# Patient Record
Sex: FEMALE | Race: WHITE | NOT HISPANIC OR LATINO | Employment: FULL TIME | URBAN - METROPOLITAN AREA
[De-identification: names, ages, dates, MRNs, and addresses within clinical notes are randomized per-mention and may not be internally consistent; named-entity substitution may affect disease eponyms.]

---

## 2017-05-02 ENCOUNTER — ALLSCRIPTS OFFICE VISIT (OUTPATIENT)
Dept: OTHER | Facility: OTHER | Age: 47
End: 2017-05-02

## 2018-01-12 ENCOUNTER — APPOINTMENT (OUTPATIENT)
Dept: LAB | Facility: CLINIC | Age: 48
End: 2018-01-12
Payer: COMMERCIAL

## 2018-01-12 ENCOUNTER — TRANSCRIBE ORDERS (OUTPATIENT)
Dept: LAB | Facility: CLINIC | Age: 48
End: 2018-01-12

## 2018-01-12 DIAGNOSIS — M24.562 CONTRACTURE OF LEFT KNEE: ICD-10-CM

## 2018-01-12 DIAGNOSIS — M24.562 CONTRACTURE OF LEFT KNEE: Primary | ICD-10-CM

## 2018-01-12 LAB
APTT PPP: 30 SECONDS (ref 23–35)
INR PPP: 0.99 (ref 0.86–1.16)
PROTHROMBIN TIME: 13.1 SECONDS (ref 12.1–14.4)

## 2018-01-12 PROCEDURE — 36415 COLL VENOUS BLD VENIPUNCTURE: CPT

## 2018-01-12 PROCEDURE — 85610 PROTHROMBIN TIME: CPT

## 2018-01-12 PROCEDURE — 85730 THROMBOPLASTIN TIME PARTIAL: CPT

## 2018-01-13 VITALS
OXYGEN SATURATION: 98 % | HEART RATE: 92 BPM | HEIGHT: 63 IN | DIASTOLIC BLOOD PRESSURE: 68 MMHG | WEIGHT: 159.13 LBS | TEMPERATURE: 98.1 F | SYSTOLIC BLOOD PRESSURE: 120 MMHG | BODY MASS INDEX: 28.2 KG/M2

## 2018-01-15 ENCOUNTER — APPOINTMENT (OUTPATIENT)
Dept: LAB | Facility: CLINIC | Age: 48
End: 2018-01-15
Payer: COMMERCIAL

## 2018-01-15 LAB
ERYTHROCYTE [DISTWIDTH] IN BLOOD BY AUTOMATED COUNT: 13.1 % (ref 11.6–15.1)
HCT VFR BLD AUTO: 36.8 % (ref 34.8–46.1)
HGB BLD-MCNC: 12.2 G/DL (ref 11.5–15.4)
MCH RBC QN AUTO: 31 PG (ref 26.8–34.3)
MCHC RBC AUTO-ENTMCNC: 33.2 G/DL (ref 31.4–37.4)
MCV RBC AUTO: 93 FL (ref 82–98)
PLATELET # BLD AUTO: 318 THOUSANDS/UL (ref 149–390)
PMV BLD AUTO: 9.8 FL (ref 8.9–12.7)
RBC # BLD AUTO: 3.94 MILLION/UL (ref 3.81–5.12)
WBC # BLD AUTO: 7.46 THOUSAND/UL (ref 4.31–10.16)

## 2018-01-15 PROCEDURE — 85027 COMPLETE CBC AUTOMATED: CPT

## 2018-01-15 PROCEDURE — 36415 COLL VENOUS BLD VENIPUNCTURE: CPT

## 2018-01-22 ENCOUNTER — ALLSCRIPTS OFFICE VISIT (OUTPATIENT)
Dept: OTHER | Facility: OTHER | Age: 48
End: 2018-01-22

## 2018-01-24 NOTE — CONSULTS
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  Recent colonoscopy showed no evidence of recurrence  She denies any history fo CAD, CVD, CHF, Diabetes, or CKD  She denies any current fever or chills  She wants to start to be more physically active, but knee problems are preventing her from doing that  No previous problems with anesthesia  Pre-op labs reviewed with no significant findings  Review of Systems    Constitutional: No fever, no chills, feels well, no tiredness, no recent weight gain or weight loss  Eyes: No complaints of eye pain, no red eyes, no eyesight problems, no discharge, no dry eyes, no itching of eyes  ENT: no complaints of earache, no loss of hearing, no nose bleeds, no nasal discharge, no sore throat, no hoarseness  Cardiovascular: No complaints of slow heart rate, no fast heart rate, no chest pain, no palpitations, no leg claudication, no lower extremity edema  Respiratory: No complaints of shortness of breath, no wheezing, no cough, no SOB on exertion, no orthopnea, no PND  Gastrointestinal: No complaints of abdominal pain, no constipation, no nausea or vomiting, no diarrhea, no bloody stools  Genitourinary: No complaints of dysuria, no incontinence, no pelvic pain, no dysmenorrhea, no vaginal discharge or bleeding  Musculoskeletal: left knee pain, but no joint swelling, no myalgias and no joint stiffness  Integumentary: No complaints of skin rash or lesions, no itching, no skin wounds, no breast pain or lump  Neurological: No complaints of headache, no confusion, no convulsions, no numbness, no dizziness or fainting, no tingling, no limb weakness, no difficulty walking     Psychiatric: Not suicidal, no sleep disturbance, no anxiety or depression, no change in personality, no emotional problems  Endocrine: No complaints of proptosis, no hot flashes, no muscle weakness, no deepening of the voice, no feelings of weakness  Hematologic/Lymphatic: No complaints of swollen glands, no swollen glands in the neck, does not bleed easily, does not bruise easily  Active Problems    1  BMI 28 0-28 9,adult (V85 24) (Z68 28)   2  Colon cancer (153 9) (C18 9)   3  Early menopause occurring in patient age younger than 39 years (256 31) (E28 319)   4  Hyperlipidemia (272 4) (E78 5)   5  Left hip pain (719 45) (M25 552)   6  Pre-operative clearance (V72 84) (Z01 818)   7  Tear of meniscus of left knee (836 2) (S83 207A)   8  Trochanteric bursitis of left hip (726 5) (M70 62)    Past Medical History    · Denied: History of depression   · History of rosacea (V13 3) (Z87 2)   · Denied: History of substance abuse   · History of Meniscus tear (836 2) (U08 458W)   · History of Tennis elbow (726 32) (M77 10)    The active problems and past medical history were reviewed and updated today  Surgical History    · History of Breast Reconstruction With Implant Prosthesis Bilateral   · History of Elbow Surgery   · History of Knee Surgery    The surgical history was reviewed and updated today         Family History    · Family history of diabetes mellitus (V18 0) (Z83 3)   · Family history of heart failure (V17 49) (Z82 49)   · Denied: Family history of mental disorder   · Denied: Family history of substance abuse    · Family history of cardiac pacemaker (V17 49) (Z84 89)   · Denied: Family history of mental disorder   · Denied: Family history of substance abuse   · History of hip replacement    · Family history of malignant neoplasm of breast (V16 3) (Z80 3)    · Family history of lung cancer (V16 1) (Z80 1)    · Family history of Cancer, colon    · Family history of malignant neoplasm of breast (V16 3) (Z80 3)    The family history was reviewed and updated today  Social History    · Denied: History of Alcohol use   · Denied: History of Drug use   · Employed   · Former smoker (O19 60) (X57 088)  The social history was reviewed and updated today  Current Meds   1  Atorvastatin Calcium 20 MG Oral Tablet; Therapy: [id number] to Recorded    The medication list was reviewed and updated today  Allergies    1  No Known Drug Allergies    2  IVP Dye    Vitals  Signs    Heart Rate: 66  Systolic: 767  Diastolic: 64  Height: 5 ft 3 in  Weight: 164 lb 2 oz  BMI Calculated: 29 07  BSA Calculated: 1 78  O2 Saturation: 97    Physical Exam    Constitutional   General appearance: No acute distress, well appearing and well nourished  Ears, Nose, Mouth, and Throat   Oropharynx: Normal with no erythema, edema, exudate or lesions  Neck   Neck: Supple, symmetric, trachea midline, no masses  Thyroid: Normal, no thyromegaly  Pulmonary   Respiratory effort: No increased work of breathing or signs of respiratory distress  Auscultation of lungs: Clear to auscultation  Cardiovascular   Auscultation of heart: Normal rate and rhythm, normal S1 and S2, no murmurs  Carotid pulses: 2+ bilaterally  Examination of extremities for edema and/or varicosities: Normal     Abdomen   Abdomen: Non-tender, no masses  Liver and spleen: No hepatomegaly or splenomegaly  Lymphatic   Palpation of lymph nodes in neck: No lymphadenopathy  Musculoskeletal   Gait and station: Normal     Range of motion: Normal     Stability: Normal     Muscle strength/tone: Normal     Neurologic   Cranial nerves: Cranial nerves II-XII intact  Results/Data  Sinus bradycardia  No acute ischemia  Assessment    1  Tear of meniscus of left knee (836 2) (S83 207A)   2  Pre-operative clearance (V72 84) (Z01 818)   3   Colon cancer (153 9) (C18 9)    Plan  Pre-operative clearance, Tear of meniscus of left knee    · EKG/ECG- POC; Status:Active - Perform Order; Requested Aman Shepard; Discussion/Summary  Surgical Clearance: She is at a LOW risk from a cardiovascular standpoint at this time without any additional cardiac testing  Reevaluation needed, if she should present with symptoms prior to surgery/procedure  Surgical clearance faxed to Dr Prabhjot Milligan MD       Pre-op labs were reviewed  EKG in office today showed no acute ischemia or concerning findings  Patient is low risk for complications during surgery  End of Encounter Meds    1  Atorvastatin Calcium 20 MG Oral Tablet;    Therapy: [id number] to Recorded    Signatures   Electronically signed by : Pastora Awad DO; Jan 22 2018  5:41PM EST                       (Author)

## 2018-02-14 ENCOUNTER — EVALUATION (OUTPATIENT)
Dept: PHYSICAL THERAPY | Facility: CLINIC | Age: 48
End: 2018-02-14
Payer: COMMERCIAL

## 2018-02-14 ENCOUNTER — TRANSCRIBE ORDERS (OUTPATIENT)
Dept: PHYSICAL THERAPY | Facility: CLINIC | Age: 48
End: 2018-02-14

## 2018-02-14 DIAGNOSIS — Z98.890 STATUS POST ARTHROSCOPIC PARTIAL LATERAL MENISCECTOMY: Primary | ICD-10-CM

## 2018-02-14 DIAGNOSIS — Z98.890 S/P MENISCECTOMY: Primary | ICD-10-CM

## 2018-02-14 PROCEDURE — G8978 MOBILITY CURRENT STATUS: HCPCS | Performed by: PHYSICAL THERAPIST

## 2018-02-14 PROCEDURE — G8979 MOBILITY GOAL STATUS: HCPCS | Performed by: PHYSICAL THERAPIST

## 2018-02-14 PROCEDURE — 97162 PT EVAL MOD COMPLEX 30 MIN: CPT | Performed by: PHYSICAL THERAPIST

## 2018-02-14 PROCEDURE — 97110 THERAPEUTIC EXERCISES: CPT | Performed by: PHYSICAL THERAPIST

## 2018-02-14 RX ORDER — MELOXICAM 7.5 MG/1
7.5 TABLET ORAL DAILY
COMMUNITY
End: 2019-03-11 | Stop reason: ALTCHOICE

## 2018-02-14 RX ORDER — ATORVASTATIN CALCIUM 20 MG/1
20 TABLET, FILM COATED ORAL DAILY
COMMUNITY
End: 2018-03-15 | Stop reason: SDUPTHER

## 2018-02-14 NOTE — PROGRESS NOTES
PT Evaluation     Today's date: 2018  Patient name: Dahlia Mayorga  : 1970  MRN: [id number]  Referring provider: Silvia Salazar MD  Dx:   Encounter Diagnosis   Name Primary?  Status post arthroscopic partial lateral meniscectomy Yes                  Assessment  Impairments: abnormal gait, abnormal muscle firing, abnormal or restricted ROM, impaired physical strength, pain with function and weight-bearing intolerance    Assessment details: Patient is a 41year old female presenting to physical therapy >2 weeks s/p left knee partial lateral meniscectomy  Patient is currently presenting with limited left knee ROM, volition quadriceps contraction, decrease strength, and decreased tolerance to ambulation  Patient will benefit from physical therapy in order to address the deficits contributing to functional limitations and facilitate return to prior level of functioning  Patient was provided a home exercise program and demonstrated an understanding of exercises  Patient was advised to stop performing home exercise program if symptoms increase or new complaints developed  Verbal understanding demonstrated regarding home exercise program instructions  Patient was educated on and agreeable to plan of care       Understanding of Dx/Px/POC: good   Prognosis: good    Goals  Short term goals:  1) Patient will demonstrate left quad set of goof in 4 weeks  2) Patient will demonstrate knee flexion >115 in 4 weeks  3) Patient will demonstrate ability to reach terminal left knee extes in 4 weeks    Long term goals:  1) Patient will ability to ambulate 30 minutes with AD in 6 weeks  2) Patient will demonstrate ability to manage stairs with step over step gait pattern in 6 weeks  3) Patient will demonstrate ability to squat with good form and without pain in left knee in 6 weeks      Plan  Patient would benefit from: skilled PT  Planned modality interventions: H-Wave, cryotherapy, TENS, unattended electrical stimulation and electrical stimulation/Russian stimulation  Planned therapy interventions: therapeutic training, therapeutic exercise, therapeutic activities, stretching, strengthening, transfer training, postural training, patient education, neuromuscular re-education, muscle pump exercises, massage, home exercise program, graded exercise, gait training, graded activity, functional ROM exercises, flexibility, body mechanics training, balance/weight bearing training, balance and ADL training  Frequency: 2x week  Duration in weeks: 6        Subjective Evaluation    History of Present Illness  Date of surgery: 2018  Mechanism of injury: Patient reports that she underwent menisectomy on left knee on 2018  Patient reports that this surgery was done on an outpatient basis at [facility] in [place]  Patient reports that this surgery was done without complication as was able to return home  Patient reports that she has been having pain in this knee since   Patient states that she has a previous menisectomy in her left knee in   Patient reports that her symptoms have been getting better since surgery  She states that she had physical therapy for her prior knee  Patient reports that she has been completing some exercise  Patient reports that her goals are to be able to get back In the gym, run a 5k, manage statis with step over step patterning  Pain  Current pain ratin  At best pain ratin  At worst pain ratin  Location: Left posterior knee- intermittent, varying, deep  Quality: pulling    Social Support  Steps to enter house: no  Stairs in house: yes (14 steps handrail on right ascending)   Lives in: multiple-level home  Lives with: spouse    Employment status: working (mostly sitting)  Exercise history: patient reports that she has been avoiding going grocery shopping, and going into the office for work  Patient reports that she likes traveling and hiking  Diagnostic Tests  MRI studies: abnormal    FCE comments: MRI that revealed meniscus tear  Treatments  Previous treatment: medication        Objective     Static Posture     Comments  Decreased weight-bearing on left lower extremity in static standing, decreased left knee extension, left femoral internal rotation  Observations     Additional Observation Details  Two anterior knee surgical incisions on left knee    Active Range of Motion   Left Knee   Flexion: 85 degrees   Extension: -2 degrees     Right Knee   Flexion: 128 degrees   Extension: Right knee active extension: 5HE  Passive Range of Motion   Left Knee   Flexion: 90 degrees   Extension: -2 degrees     Right Knee   Extension: Right knee passive extension: 3HE  Mobility   Patellar Mobility:   Left Knee   WFL: medial, lateral, superior and inferior  Right Knee   WFL: medial, lateral, superior and inferior    Strength/Myotome Testing     Left Knee   Quadriceps contraction: poor    Right Knee   Flexion: 5  Extension: 5  Quadriceps contraction: good    Ambulation     Observational Gait   Decreased walking speed  Additional Observational Gait Details  Decreased left knee flexion during swing phase, trace reverse trendelenburg to left  Ambulation assessed without straight cane  Patient presented to clinic with single point cane this visit  Worden Niall was sized appropriately         Precautions: Hx of colon cancer    Daily Treatment Diary     Manual  2/14            Left knee PROM TK                                                                    Exercise Diary  2/14            Quad sets 5 sec hold x 20            Calf stretch with strap 5 x 30 sec            Hamstring stretch with strap 5 x 30 sec            SLR 10x            [employer] 15x            Heel prop 5 min Modalities

## 2018-02-14 NOTE — LETTER
2018    Prabhjot Milligan MD  [address] [facility]    Patient: Channing Moeller   YOB: 1970   Date of Visit: 2018     Encounter Diagnosis     ICD-10-CM    1  Status post arthroscopic partial lateral meniscectomy Z98 [id number]        Dear Dr Raya:    Please review the attached Plan of Care from [facility] recent visit  Please verify that you agree therapy should continue by signing the attached document and sending it back to our office  If you have any questions or concerns, please don't hesitate to call  Sincerely,    Miguelina Babin, PT      Referring Provider:      I certify that I have read the below Plan of Care and certify the need for these services furnished under this plan of treatment while under my care  Prabhjot Milligan MD  [address] [facility] [place]: [phone]          PT Evaluation     Today's date: 2018  Patient name: Channing Moeller  : 1970  MRN: [id number]  Referring provider: Myla Neely MD  Dx:   Encounter Diagnosis   Name Primary?  Status post arthroscopic partial lateral meniscectomy Yes                  Assessment  Impairments: abnormal gait, abnormal muscle firing, abnormal or restricted ROM, impaired physical strength, pain with function and weight-bearing intolerance    Assessment details: Patient is a 41year old female presenting to physical therapy >2 weeks s/p left knee partial lateral meniscectomy  Patient is currently presenting with limited left knee ROM, volition quadriceps contraction, decrease strength, and decreased tolerance to ambulation  Patient will benefit from physical therapy in order to address the deficits contributing to functional limitations and facilitate return to prior level of functioning  Patient was provided a home exercise program and demonstrated an understanding of exercises    Patient was advised to stop performing home exercise program if symptoms increase or new complaints developed  Verbal understanding demonstrated regarding home exercise program instructions  Patient was educated on and agreeable to plan of care  Understanding of Dx/Px/POC: good   Prognosis: good    Goals  Short term goals:  1) Patient will demonstrate left quad set of goof in 4 weeks  2) Patient will demonstrate knee flexion >115 in 4 weeks  3) Patient will demonstrate ability to reach terminal left knee extes in 4 weeks    Long term goals:  1) Patient will ability to ambulate 30 minutes with AD in 6 weeks  2) Patient will demonstrate ability to manage stairs with step over step gait pattern in 6 weeks  3) Patient will demonstrate ability to squat with good form and without pain in left knee in 6 weeks      Plan  Patient would benefit from: skilled PT  Planned modality interventions: H-Wave, cryotherapy, TENS, unattended electrical stimulation and electrical stimulation/Russian stimulation  Planned therapy interventions: therapeutic training, therapeutic exercise, therapeutic activities, stretching, strengthening, transfer training, postural training, patient education, neuromuscular re-education, muscle pump exercises, massage, home exercise program, graded exercise, gait training, graded activity, functional ROM exercises, flexibility, body mechanics training, balance/weight bearing training, balance and ADL training  Frequency: 2x week  Duration in weeks: 6        Subjective Evaluation    History of Present Illness  Date of surgery: 1/29/2018  Mechanism of injury: Patient reports that she underwent menisectomy on left knee on 1/29/2018  Patient reports that this surgery was done on an outpatient basis at [facility] in [place]  Patient reports that this surgery was done without complication as was able to return home  Patient reports that she has been having pain in this knee since 2014   Patient states that she has a previous menisectomy in her left knee in 2016  Patient reports that her symptoms have been getting better since surgery  She states that she had physical therapy for her prior knee  Patient reports that she has been completing some exercise  Patient reports that her goals are to be able to get back In the gym, run a 5k, manage statis with step over step patterning  Pain  Current pain ratin  At best pain ratin  At worst pain ratin  Location: Left posterior knee- intermittent, varying, deep  Quality: pulling    Social Support  Steps to enter house: no  Stairs in house: yes (14 steps handrail on right ascending)   Lives in: multiple-level home  Lives with: spouse    Employment status: working (mostly sitting)  Exercise history: patient reports that she has been avoiding going grocery shopping, and going into the office for work  Patient reports that she likes traveling and hiking  Diagnostic Tests  MRI studies: abnormal    FCE comments: MRI that revealed meniscus tear  Treatments  Previous treatment: medication        Objective     Static Posture     Comments  Decreased weight-bearing on left lower extremity in static standing, decreased left knee extension, left femoral internal rotation  Observations     Additional Observation Details  Two anterior knee surgical incisions on left knee    Active Range of Motion   Left Knee   Flexion: 85 degrees   Extension: -2 degrees     Right Knee   Flexion: 128 degrees   Extension: Right knee active extension: 5HE  Passive Range of Motion   Left Knee   Flexion: 90 degrees   Extension: -2 degrees     Right Knee   Extension: Right knee passive extension: 3HE  Mobility   Patellar Mobility:   Left Knee   WFL: medial, lateral, superior and inferior       Right Knee   WFL: medial, lateral, superior and inferior    Strength/Myotome Testing     Left Knee   Quadriceps contraction: poor    Right Knee   Flexion: 5  Extension: 5  Quadriceps contraction: good    Ambulation     Observational Gait Decreased walking speed  Additional Observational Gait Details  Decreased left knee flexion during swing phase, trace reverse trendelenburg to left  Ambulation assessed without straight cane  Patient presented to clinic with single point cane this visit  Arielle Carolina was sized appropriately         Precautions: Hx of colon cancer    Daily Treatment Diary     Manual  2/14            Left knee PROM TK                                                                    Exercise Diary  2/14            Quad sets 5 sec hold x 20            Calf stretch with strap 5 x 30 sec            Hamstring stretch with strap 5 x 30 sec            SLR 10x            [employer] 15x            Heel prop 5 min                                                                                                                                                                                                      Modalities

## 2018-02-16 ENCOUNTER — OFFICE VISIT (OUTPATIENT)
Dept: PHYSICAL THERAPY | Facility: CLINIC | Age: 48
End: 2018-02-16
Payer: COMMERCIAL

## 2018-02-16 DIAGNOSIS — Z98.890 STATUS POST ARTHROSCOPIC PARTIAL LATERAL MENISCECTOMY: Primary | ICD-10-CM

## 2018-02-16 PROCEDURE — 97140 MANUAL THERAPY 1/> REGIONS: CPT | Performed by: PHYSICAL THERAPIST

## 2018-02-16 PROCEDURE — 97014 ELECTRIC STIMULATION THERAPY: CPT | Performed by: PHYSICAL THERAPIST

## 2018-02-16 NOTE — PROGRESS NOTES
Daily Note     Today's date: 2018  Patient name: Luciana Linton  : 1970  MRN: [id number]  Referring provider: Dianna Chavez MD  Dx:   Encounter Diagnosis   Name Primary?  Status post arthroscopic partial lateral meniscectomy Yes       Subjective: Patient reports that her knee is feeling better and is currently reporting pain of 3/10  She states that the hamstring stretch and calf stretch have really helped to improved her motion and decrease pain  Precautions: Hx of colon cancer  Objective: See treatment diary below  Manual  2/14  2/15                   Left knee PROM TK  TK                                                                                                                         Exercise Diary                     Quad sets 5 sec hold x 20  5 sec hold x 20                   Calf stretch with strap 5 x 30 sec  5 x 30 sec                   Hamstring stretch with strap 5 x 30 sec  5 x 30 sec                   SLR 10x  10x                   [employer]ell 15x  15x                   Heel prop 5 min  NP                    wall slides for knee flexion   15x                    heel raises    20x                    left hip abd/ext   20x                                                                                                                                                                                                                                                                                                 Modalities                         NMES to left uad   10'                                                                   Assessment: Patient denied contraindications to application of NMES  She demonstrated improved quad set following NMES application  Patient demonstrated improved left knee flexion PROM as she was able to reach 116 degrees this visit  Left knee extension aslo improved as she was able to reach terminal knee extension   Patient will continue to benefit from PT in order to improve knee flexion ROM and strength  Plan: Continue per plan of care

## 2018-02-20 ENCOUNTER — OFFICE VISIT (OUTPATIENT)
Dept: PHYSICAL THERAPY | Facility: CLINIC | Age: 48
End: 2018-02-20
Payer: COMMERCIAL

## 2018-02-20 DIAGNOSIS — Z98.890 STATUS POST ARTHROSCOPIC PARTIAL LATERAL MENISCECTOMY: Primary | ICD-10-CM

## 2018-02-20 PROCEDURE — 97110 THERAPEUTIC EXERCISES: CPT | Performed by: PHYSICAL THERAPIST

## 2018-02-20 PROCEDURE — 97140 MANUAL THERAPY 1/> REGIONS: CPT | Performed by: PHYSICAL THERAPIST

## 2018-02-20 PROCEDURE — 97112 NEUROMUSCULAR REEDUCATION: CPT | Performed by: PHYSICAL THERAPIST

## 2018-02-20 PROCEDURE — 97014 ELECTRIC STIMULATION THERAPY: CPT | Performed by: PHYSICAL THERAPIST

## 2018-02-20 NOTE — PROGRESS NOTES
Daily Note     Today's date: 2018  Patient name: Avinash Li  : 1970  MRN: [id number]  Referring provider: Mehnaz Godinez MD  Dx:   Encounter Diagnosis   Name Primary?  Status post arthroscopic partial lateral meniscectomy Yes       Subjective: Patient reports that she was on her knee maybe more than she should this past weekend  Precautions: Hx of colon cancer  Objective: See treatment diary below  Manual  2/14  2/15  2/20                 Left knee PROM TK  TK  PW                                                                                                                       Exercise Diary                   Quad sets 5 sec hold x 20  5 sec hold x 20  5 sec hold X20                 Calf stretch with strap 5 x 30 sec  5 x 30 sec  5X 30sec                 Hamstring stretch with strap 5 x 30 sec  5 x 30 sec  5X 30sec                 SLR 10x  10x  10x                 [employer]ell 15x  15x  15X                 Heel prop 5 min  NP  NP                  wall slides for knee flexion   15x  15X                  heel raises    20x  20X                  left hip abd/ext   20x  20X                                                                                                                                                                                                                                                                                               Modalities                       NMES to left uad   10'  10'                                                                 Assessment: Patient denied contraindications to application of NMES  She demonstrated improved quad set following NMES application  Patient demonstrated improved left knee flexion PROM as she was able to reach 116 degrees this visit  Left knee extension aslo improved as she was able to reach terminal knee extension   Patient will continue to benefit from PT in order to improve knee flexion ROM and strength  Plan: Continue per plan of care

## 2018-02-22 ENCOUNTER — OFFICE VISIT (OUTPATIENT)
Dept: PHYSICAL THERAPY | Facility: CLINIC | Age: 48
End: 2018-02-22
Payer: COMMERCIAL

## 2018-02-22 DIAGNOSIS — Z98.890 STATUS POST ARTHROSCOPIC PARTIAL LATERAL MENISCECTOMY: Primary | ICD-10-CM

## 2018-02-22 PROCEDURE — 97140 MANUAL THERAPY 1/> REGIONS: CPT | Performed by: PHYSICAL THERAPIST

## 2018-02-22 PROCEDURE — 97110 THERAPEUTIC EXERCISES: CPT | Performed by: PHYSICAL THERAPIST

## 2018-02-22 PROCEDURE — 97112 NEUROMUSCULAR REEDUCATION: CPT | Performed by: PHYSICAL THERAPIST

## 2018-02-22 NOTE — PROGRESS NOTES
Daily Note     Today's date: 2018  Patient name: Naveen Bright  : 1970  MRN: [id number]  Referring provider: Anupam Hernandez MD  Dx:   Encounter Diagnosis     ICD-10-CM    1  Status post arthroscopic partial lateral meniscectomy Z98 890                   Subjective: Patient reports that her left knee is more stiff than painful this morning  She states that she would rate this this discomfort 4/10  Patient states that her biggest difficult is descending stairs due to decreased knee flexion ROM and strength      Objective: See treatment diary below  Precautions: Hx of colon cancer  Objective: See treatment diary below  Manual  2/14  2/15  2/20  2/22               Left knee PROM TK  TK  PW  TK                patella Mobs       TK                                                                                             Exercise Diary                 Quad sets 5 sec hold x 20  5 sec hold x 20  5 sec hold X20  5 sec hold X20               Calf stretch with strap 5 x 30 sec  5 x 30 sec  5X 30sec  5X 30sec               Hamstring stretch with strap 5 x 30 sec  5 x 30 sec  5X 30sec  5X 30sec               SLR 10x  10x  10x  10x 1#               Benders of jewell 15x  15x  15X 15X               Heel prop 5 min  NP  NP  NP                wall slides for knee flexion   15x  15X  15x                heel raises    20x  20X 20X                left hip abd/ext   20x  20X  20X                total gym         level 3  2 x 10                                                                                                                                                                                                                                                                     Modalities                     NMES to left uad   10'  10'  10'                                                                      Assessment: Patient demonstrated improved knee extension ROM as she was able to reach 0 degrees of knee extension  She was able to reach 113 degrees of left knee passive flexion  Patient was able to tolerate addition of total gym without pain, however placed more weight of right lower extremity during activity  She will continue to benefit from PT in order to improve left knee flexion ROM, quadriceps strengthening, and gluteal strengthening  Plan: Continue per plan of care

## 2018-02-27 ENCOUNTER — APPOINTMENT (OUTPATIENT)
Dept: PHYSICAL THERAPY | Facility: CLINIC | Age: 48
End: 2018-02-27
Payer: COMMERCIAL

## 2018-03-01 ENCOUNTER — OFFICE VISIT (OUTPATIENT)
Dept: PHYSICAL THERAPY | Facility: CLINIC | Age: 48
End: 2018-03-01
Payer: COMMERCIAL

## 2018-03-01 DIAGNOSIS — Z98.890 STATUS POST ARTHROSCOPIC PARTIAL LATERAL MENISCECTOMY: Primary | ICD-10-CM

## 2018-03-01 PROCEDURE — 97110 THERAPEUTIC EXERCISES: CPT | Performed by: PHYSICAL THERAPIST

## 2018-03-01 PROCEDURE — 97014 ELECTRIC STIMULATION THERAPY: CPT | Performed by: PHYSICAL THERAPIST

## 2018-03-01 PROCEDURE — 97140 MANUAL THERAPY 1/> REGIONS: CPT | Performed by: PHYSICAL THERAPIST

## 2018-03-01 PROCEDURE — 97112 NEUROMUSCULAR REEDUCATION: CPT | Performed by: PHYSICAL THERAPIST

## 2018-03-01 NOTE — PROGRESS NOTES
Daily Note     Today's date: 3/1/2018  Patient name: Valentín Walls  : 1970  MRN: [id number]  Referring provider: Simin Lizarraga MD  Dx:   Encounter Diagnosis     ICD-10-CM    1  Status post arthroscopic partial lateral meniscectomy Z98 [id number]        Subjective: Patient reports that she is having difficulty with descending stairs with step over step patterning due  to decreased left knee flexion ROM and strength      Precautions: Hx of colon cancer  Objective: See treatment diary below  Manual  2/14  2/15  2/20  2/22  3/1             Left knee PROM TK  TK  PW  TK  TK              patella Mobs       TK  TK              knee flexion with inferior glide of patella          TK                                                                   Exercise Diary  2/14  2/16  2/20  2/22  3/1             Quad sets 5 sec hold x 20  5 sec hold x 20  5 sec hold X20  5 sec hold X20   5 sec hold X20             Calf stretch with strap 5 x 30 sec  5 x 30 sec  5X 30sec  5X 30sec  5X 30sec             Hamstring stretch with strap 5 x 30 sec  5 x 30 sec  5X 30sec  5X 30sec  5X 30sec             SLR 10x  10x  10x  10x 1#  10x 1 5#             [employer] of jewell 15x  15x  15X 15X NP             Heel prop 5 min  NP  NP  NP  NP              wall slides for knee flexion   15x  15X  15x  15x              heel raises    20x  20X 20X  20X              left hip abd/ext   20x  20X  20X  20X              total gym         level 3  2 x 10  NP              heel slides with strap         15x              TKE          GTB 2 x 10             Forward step ups         4 in 2 x 10              lateral step ups         4 in 2 x 10              resisted lateral walking         RTB 5 laps                                                                                                                                           Modalities     2/16  2/20  2/22  3/1              NMES to left uad   10'  10'  10'  10'                                                                   Assessment: Patient demonstrated normal knee extension as she had 4 degrees of hyperextension this visit  Patient continues to be limited in knee flexion as she was able to reach 107 degrees actively  Patient able to reach 115 degrees of left knee flexion passively  Patient tolerated addition of step up activities without complaints of pain  She will continue to benefit from PT in order to improve left knee flexion ROM and strength to facilitate pain free stair management  Plan: Continue per plan of care

## 2018-03-06 ENCOUNTER — OFFICE VISIT (OUTPATIENT)
Dept: PHYSICAL THERAPY | Facility: CLINIC | Age: 48
End: 2018-03-06
Payer: COMMERCIAL

## 2018-03-06 DIAGNOSIS — Z98.890 STATUS POST ARTHROSCOPIC PARTIAL LATERAL MENISCECTOMY: Primary | ICD-10-CM

## 2018-03-06 PROCEDURE — 97110 THERAPEUTIC EXERCISES: CPT | Performed by: PHYSICAL THERAPIST

## 2018-03-06 PROCEDURE — 97112 NEUROMUSCULAR REEDUCATION: CPT | Performed by: PHYSICAL THERAPIST

## 2018-03-06 NOTE — PROGRESS NOTES
Daily Note     Today's date: 3/6/2018  Patient name: Magdalene Fregoso  : 1970  MRN: [id number]  Referring provider: Mnany Churchill MD  Dx:   Encounter Diagnosis     ICD-10-CM    1  Status post arthroscopic partial lateral meniscectomy Z98 [id number]                   Subjective: Patient reports that she is having more ease with bending her knee following completing HEP of wall slides  She reports this has allowed her to descend stairs with increased ease  She states that she continues to have some pain in her left knee with descending stairs, however this is improving  She reported current pain of 2/10 in the posterior aspect of her knee        Objective: See treatment diary below  Precautions: Hx of colon cancer  Objective: See treatment diary below  Manual  2/14  2/15  2/20  2/22  3/1  3/           Left knee PROM TK  TK  PW  TK  TK  TK            patella Mobs       TK  TK              knee flexion with inferior glide of patella          TK                                                                   Exercise Diary  2/14  2/16  2/20  2/22  3/1  3/6           Quad sets 5 sec hold x 20  5 sec hold x 20  5 sec hold X20  5 sec hold X20   5 sec hold X20   5 sec hold X20           Calf stretch with strap 5 x 30 sec  5 x 30 sec  5X 30sec  5X 30sec  5X 30sec   5X 30sec           Hamstring stretch with strap 5 x 30 sec  5 x 30 sec  5X 30sec  5X 30sec  5X 30sec  NP           SLR FLX/ABD 10x  10x  10x  10x 1#  10x 1 5#  10x 2 0lbs           [employer]enders of jewell 15x  15x  15X 15X NP  NP           Heel prop 5 min  NP  NP  NP  NP NP            wall slides for knee flexion   15x  15X  15x  15x  20x            heel raises    20x  20X 20X  20X              left hip abd/ext   20x  20X  20X  20X  20x GTB            total gym         level 3  2 x 10  NP  level 4 2 x 10            heel slides with strap          15x 20x            TKE          GTB 2 x 10 GTB 2 x 10           Forward step ups          4 in 2 x 10  4 in 2 x 10          lateral step ups          4 in 2 x 10   4 in 2 x 10            resisted lateral walking         RTB 5 laps  GTB 5 laps           single leg stance on airex           5 sec hold 5 hold                                                                                                                  Modalities     2/16  2/20  2/22  3/1  3/6            NMES to left uad   10'  10'  10'  10'   10'                                                                 Assessment: Patient demonstrated improved knee flexion AAROM this visit as she was able to reach 119 degrees supine with strap assistance  She demonstrated improved quad set, however does not have symmetrical  Quad set in comparison to right and continues to benefit from NMES  Patient will continue to benefit from PT in order improve left knee flexion ROM, volitional quadriceps control, and quadriceps eccentric strength  Plan: Progress note during next visit

## 2018-03-08 ENCOUNTER — OFFICE VISIT (OUTPATIENT)
Dept: PHYSICAL THERAPY | Facility: CLINIC | Age: 48
End: 2018-03-08
Payer: COMMERCIAL

## 2018-03-08 ENCOUNTER — APPOINTMENT (OUTPATIENT)
Dept: PHYSICAL THERAPY | Facility: CLINIC | Age: 48
End: 2018-03-08
Payer: COMMERCIAL

## 2018-03-08 DIAGNOSIS — Z98.890 STATUS POST ARTHROSCOPIC PARTIAL LATERAL MENISCECTOMY: Primary | ICD-10-CM

## 2018-03-08 PROCEDURE — 97140 MANUAL THERAPY 1/> REGIONS: CPT | Performed by: PHYSICAL THERAPIST

## 2018-03-08 PROCEDURE — 97112 NEUROMUSCULAR REEDUCATION: CPT | Performed by: PHYSICAL THERAPIST

## 2018-03-08 PROCEDURE — 97110 THERAPEUTIC EXERCISES: CPT | Performed by: PHYSICAL THERAPIST

## 2018-03-08 NOTE — PROGRESS NOTES
PT Re-Evaluation     Today's date: 3/8/2018  Patient name: Luciana Linton  : 1970  MRN: [id number]  Referring provider: Dianna Chavez MD  Dx:   Encounter Diagnosis   Name Primary?  Status post arthroscopic partial lateral meniscectomy Yes                  Assessment  Impairments: abnormal gait, abnormal muscle firing, abnormal or restricted ROM, impaired physical strength, pain with function and weight-bearing intolerance    Assessment details: Luciana Linton has demonstrated decreased pain, increased strength, increased range of motion, and increased activity tolerance since starting physical therapy services  They report an overall improvement of 90-95% thus far  Chrissie Balderas continues to demonstrate decreased volitional left quadriceps contraction, decreased quadriceps strength and decreased left knee flexion ROM  These deficits are limiting her ability to return to exercise routine and descend stairs without pain  She will continue to benefit from PT in order to improve left knee quadriceps strength and flexion ROM    Understanding of Dx/Px/POC: good   Prognosis: good    Goals  Short term goals:  1) Patient will demonstrate left quad set of good in 4 weeks- not met  2) Patient will demonstrate knee flexion >115 in 4 weeks- met  3) Patient will demonstrate ability to reach terminal left knee extes in 4 weeks- met    Long term goals:  1) Patient will ability to ambulate 30 minutes with AD in 6 weeks- met  2) Patient will demonstrate ability to manage stairs with step over step gait pattern in 6 weeks-partially met  3) Patient will demonstrate ability to squat with good form and without pain in left knee in 6 weeks-not met    Plan  Patient would benefit from: skilled PT  Referral necessary: No  Planned modality interventions: H-Wave, cryotherapy, TENS, unattended electrical stimulation and electrical stimulation/Russian stimulation  Planned therapy interventions: therapeutic training, therapeutic exercise, therapeutic activities, stretching, strengthening, transfer training, postural training, patient education, neuromuscular re-education, muscle pump exercises, massage, home exercise program, graded exercise, gait training, graded activity, functional ROM exercises, flexibility, body mechanics training, balance/weight bearing training, balance and ADL training  Frequency: 2x week  Duration in weeks: 4  Treatment plan discussed with: patient        Subjective Evaluation    History of Present Illness  Date of surgery: 2018  Mechanism of injury: Patient reports that she has noticed an improvement in her knee since starting physical therapy  She reports that she has noticed an improvement in her ability to end and straighten her leg  She states that she is able to manage stairs  She states that she is able to ascend without pain  She states that she has difficulty descending stairs still, however this is improving every day  Patient reports that she has not attempted to get back into her workout routine  Patient states that she has returned to 90-95% prior level function  Pain  Current pain ratin  At best pain ratin  At worst pain ratin  Location: Left posterior knee- intermittent, varying, deep  Quality: pulling    Social Support  Steps to enter house: no  Stairs in house: yes (14 steps handrail on right ascending)   Lives in: multiple-level home  Lives with: spouse    Employment status: working (mostly sitting)  Exercise history: patient reports that she has been avoiding going grocery shopping, and going into the office for work  Patient reports that she likes traveling and hiking  Diagnostic Tests  MRI studies: abnormal    [facility] comments: MRI that revealed meniscus tear   Treatments  Previous treatment: medication        Objective     Static Posture     Comments  Decreased weight-bearing on left lower extremity in static standing, decreased left knee extension, left femoral internal rotation  Observations   Left Knee   Positive for effusion and incision  Right Knee   Negative for effusion  Additional Observation Details  Two anterior knee surgical incisions on left knee    Active Range of Motion   Left Knee   Flexion: 118 degrees   Extension: Left knee active extension: 3HE  Right Knee   Flexion: 128 degrees   Extension: Right knee active extension: 5HE  Passive Range of Motion   Left Knee   Flexion: 90 degrees   Extension: Left knee passive extension: 2HE  Right Knee   Extension: Right knee passive extension: 3HE  Mobility   Patellar Mobility:   Left Knee   WFL: medial, lateral, superior and inferior  Right Knee   WFL: medial, lateral, superior and inferior    Strength/Myotome Testing     Left Knee   Flexion: 5  Extension: 4+  Quadriceps contraction: fair    Right Knee   Flexion: 5  Extension: 5  Quadriceps contraction: good    Ambulation     Observational Gait   Decreased walking speed  Additional Observational Gait Details  Decreased left knee flexion during swing phase, trace reverse trendelenburg to left  Ambulation assessed without straight cane  Patient presented to clinic with single point cane this visit  Sharron Skeens was sized appropriately         Precautions: Hx of colon cancer       Exercise Diary  2/14  2/16  2/20  2/22  3/1  3/6  3/8         Quad sets 5 sec hold x 20  5 sec hold x 20  5 sec hold X20  5 sec hold X20   5 sec hold X20   5 sec hold X20 5 sec hold X20         Calf stretch with strap 5 x 30 sec  5 x 30 sec  5X 30sec  5X 30sec  5X 30sec   5X 30sec   5X 30sec         Hamstring stretch with strap 5 x 30 sec  5 x 30 sec  5X 30sec  5X 30sec  5X 30sec  NP   5X 30sec         SLR FLX/ABD 10x  10x  10x  10x 1#  10x 1 5#  10x 2 0lbs  10x 2 0lbs         B[employer] of jewell 15x  15x  15X 15X NP  NP  NP         Heel prop 5 min  NP  NP  NP  NP NP  NP          wall slides for knee flexion   15x  15X  15x  15x  20x  20x          heel raises    20x  20X 20X  20X    20X          left hip abd/ext   20x  20X  20X  20X  20x GTB   20x GTB          total gym         level 3  2 x 10  NP  level 4 2 x 10   level 3 2 x 10          heel slides with strap          15x 20x  20x          TKE          GTB 2 x 10 GTB 2 x 10  BTB 2 x 10        Forward step ups          4 in 2 x 10  4 in 2 x 10  6 in 2 x 10          lateral step ups          4 in 2 x 10   4 in 2 x 10 6 in 2 x 10          resisted lateral walking         RTB 5 laps  GTB 5 laps  GTB 5 laps         single leg stance on airex           5 sec hold xx 5  10 sec x 5

## 2018-03-08 NOTE — LETTER
2018    Rema Moralez MD  [address] [facility]    Patient: Jesse Luke   YOB: 1970   Date of Visit: 3/8/2018     Encounter Diagnosis     ICD-10-CM    1  Status post arthroscopic partial lateral meniscectomy Z98 890        Dear Dr Fidelia Ladd:    Please review the attached Plan of Care from [facility] recent visit  Please verify that you agree therapy should continue by signing the attached document and sending it back to our office  If you have any questions or concerns, please don't hesitate to call  Sincerely,    Bruce Griffin PT      Referring Provider:      I certify that I have read the below Plan of Care and certify the need for these services furnished under this plan of treatment while under my care  Rema Moralez MD  [address] [facility] [place]: [phone]          PT Re-Evaluation     Today's date: 3/8/2018  Patient name: Jesse Luke  : 1970  MRN: [id number]  Referring provider: Yeimy Hansen MD  Dx:   Encounter Diagnosis   Name Primary?  Status post arthroscopic partial lateral meniscectomy Yes                  Assessment  Impairments: abnormal gait, abnormal muscle firing, abnormal or restricted ROM, impaired physical strength, pain with function and weight-bearing intolerance    Assessment details: Jesse Luke has demonstrated decreased pain, increased strength, increased range of motion, and increased activity tolerance since starting physical therapy services  They report an overall improvement of 90-95% thus far  Dixon Jean-Baptiste continues to demonstrate decreased volitional left quadriceps contraction, decreased quadriceps strength and decreased left knee flexion ROM  These deficits are limiting her ability to return to exercise routine and descend stairs without pain  She will continue to benefit from PT in order to improve left knee quadriceps strength and flexion ROM    Understanding of Dx/Px/POC: good   Prognosis: good    Goals  Short term goals:  1) Patient will demonstrate left quad set of good in 4 weeks- not met  2) Patient will demonstrate knee flexion >115 in 4 weeks- met  3) Patient will demonstrate ability to reach terminal left knee extes in 4 weeks- met    Long term goals:  1) Patient will ability to ambulate 30 minutes with AD in 6 weeks- met  2) Patient will demonstrate ability to manage stairs with step over step gait pattern in 6 weeks-partially met  3) Patient will demonstrate ability to squat with good form and without pain in left knee in 6 weeks-not met    Plan  Patient would benefit from: skilled PT  Referral necessary: No  Planned modality interventions: H-Wave, cryotherapy, TENS, unattended electrical stimulation and electrical stimulation/Russian stimulation  Planned therapy interventions: therapeutic training, therapeutic exercise, therapeutic activities, stretching, strengthening, transfer training, postural training, patient education, neuromuscular re-education, muscle pump exercises, massage, home exercise program, graded exercise, gait training, graded activity, functional ROM exercises, flexibility, body mechanics training, balance/weight bearing training, balance and ADL training  Frequency: 2x week  Duration in weeks: 4  Treatment plan discussed with: patient        Subjective Evaluation    History of Present Illness  Date of surgery: 1/29/2018  Mechanism of injury: Patient reports that she has noticed an improvement in her knee since starting physical therapy  She reports that she has noticed an improvement in her ability to end and straighten her leg  She states that she is able to manage stairs  She states that she is able to ascend without pain  She states that she has difficulty descending stairs still, however this is improving every day  Patient reports that she has not attempted to get back into her workout routine   Patient states that she has returned to 90-95% prior level function  Pain  Current pain ratin  At best pain ratin  At worst pain ratin  Location: Left posterior knee- intermittent, varying, deep  Quality: pulling    Social Support  Steps to enter house: no  Stairs in house: yes (14 steps handrail on right ascending)   Lives in: multiple-level home  Lives with: spouse    Employment status: working (mostly sitting)  Exercise history: patient reports that she has been avoiding going grocery shopping, and going into the office for work  Patient reports that she likes traveling and hiking  Diagnostic Tests  MRI studies: abnormal    [facility]CE comments: MRI that revealed meniscus tear  Treatments  Previous treatment: medication        Objective     Static Posture     Comments  Decreased weight-bearing on left lower extremity in static standing, decreased left knee extension, left femoral internal rotation  Observations   Left Knee   Positive for effusion and incision  Right Knee   Negative for effusion  Additional Observation Details  Two anterior knee surgical incisions on left knee    Active Range of Motion   Left Knee   Flexion: 118 degrees   Extension: Left knee active extension: 3HE  Right Knee   Flexion: 128 degrees   Extension: Right knee active extension: 5HE  Passive Range of Motion   Left Knee   Flexion: 90 degrees   Extension: Left knee passive extension: 2HE  Right Knee   Extension: Right knee passive extension: 3HE  Mobility   Patellar Mobility:   Left Knee   WFL: medial, lateral, superior and inferior  Right Knee   WFL: medial, lateral, superior and inferior    Strength/Myotome Testing     Left Knee   Flexion: 5  Extension: 4+  Quadriceps contraction: fair    Right Knee   Flexion: 5  Extension: 5  Quadriceps contraction: good    Ambulation     Observational Gait   Decreased walking speed       Additional Observational Gait Details  Decreased left knee flexion during swing phase, trace reverse trendelenburg to left  Ambulation assessed without straight cane  Patient presented to clinic with single point cane this visit  Dilcia Frankel was sized appropriately         Precautions: Hx of colon cancer       Exercise Diary  2/14  2/16  2/20  2/22  3/1  3/6  3/8         Quad sets 5 sec hold x 20  5 sec hold x 20  5 sec hold X20  5 sec hold X20   5 sec hold X20   5 sec hold X20 5 sec hold X20         Calf stretch with strap 5 x 30 sec  5 x 30 sec  5X 30sec  5X 30sec  5X 30sec   5X 30sec   5X 30sec         Hamstring stretch with strap 5 x 30 sec  5 x 30 sec  5X 30sec  5X 30sec  5X 30sec  NP   5X 30sec         SLR FLX/ABD 10x  10x  10x  10x 1#  10x 1 5#  10x 2 0lbs  10x 2 0lbs         [employer] jewell 15x  15x  15X 15X NP  NP  NP         Heel prop 5 min  NP  NP  NP  NP NP  NP          wall slides for knee flexion   15x  15X  15x  15x  20x  20x          heel raises    20x  20X 20X  20X    20X          left hip abd/ext   20x  20X  20X  20X  20x GTB   20x GTB          total gym         level 3  2 x 10  NP  level 4 2 x 10   level 3 2 x 10          heel slides with strap          15x 20x  20x          TKE          GTB 2 x 10 GTB 2 x 10  BTB 2 x 10        Forward step ups          4 in 2 x 10  4 in 2 x 10  6 in 2 x 10          lateral step ups          4 in 2 x 10   4 in 2 x 10 6 in 2 x 10          resisted lateral walking         RTB 5 laps  GTB 5 laps  GTB 5 laps         single leg stance on airex           5 sec hold xx 5  10 sec x 5

## 2018-03-13 ENCOUNTER — APPOINTMENT (OUTPATIENT)
Dept: PHYSICAL THERAPY | Facility: CLINIC | Age: 48
End: 2018-03-13
Payer: COMMERCIAL

## 2018-03-14 PROBLEM — C18.9 COLON CANCER (HCC): Status: ACTIVE | Noted: 2017-05-02

## 2018-03-14 PROBLEM — M25.552 LEFT HIP PAIN: Status: ACTIVE | Noted: 2017-05-02

## 2018-03-14 PROBLEM — E28.319 EARLY MENOPAUSE OCCURRING IN PATIENT AGE YOUNGER THAN 45 YEARS: Status: ACTIVE | Noted: 2017-05-02

## 2018-03-14 PROBLEM — E78.5 HYPERLIPIDEMIA: Status: ACTIVE | Noted: 2017-05-02

## 2018-03-14 PROBLEM — M70.62 TROCHANTERIC BURSITIS OF LEFT HIP: Status: ACTIVE | Noted: 2017-05-02

## 2018-03-14 PROBLEM — S83.207A TEAR OF MENISCUS OF LEFT KNEE: Status: ACTIVE | Noted: 2018-01-22

## 2018-03-15 ENCOUNTER — APPOINTMENT (OUTPATIENT)
Dept: PHYSICAL THERAPY | Facility: CLINIC | Age: 48
End: 2018-03-15
Payer: COMMERCIAL

## 2018-03-15 DIAGNOSIS — E78.5 DYSLIPIDEMIA: Primary | ICD-10-CM

## 2018-03-15 RX ORDER — ATORVASTATIN CALCIUM 20 MG/1
20 TABLET, FILM COATED ORAL DAILY
Qty: 90 TABLET | Refills: 3 | Status: SHIPPED | OUTPATIENT
Start: 2018-03-15 | End: 2019-10-21 | Stop reason: SDUPTHER

## 2018-03-19 ENCOUNTER — OFFICE VISIT (OUTPATIENT)
Dept: OBGYN CLINIC | Age: 48
End: 2018-03-19
Payer: COMMERCIAL

## 2018-03-19 VITALS
SYSTOLIC BLOOD PRESSURE: 120 MMHG | WEIGHT: 164 LBS | DIASTOLIC BLOOD PRESSURE: 72 MMHG | BODY MASS INDEX: 29.06 KG/M2 | HEIGHT: 63 IN

## 2018-03-19 DIAGNOSIS — Z01.419 ENCOUNTER FOR GYNECOLOGICAL EXAMINATION: Primary | ICD-10-CM

## 2018-03-19 DIAGNOSIS — Z12.31 ENCOUNTER FOR SCREENING MAMMOGRAM FOR MALIGNANT NEOPLASM OF BREAST: ICD-10-CM

## 2018-03-19 PROCEDURE — 99396 PREV VISIT EST AGE 40-64: CPT | Performed by: OBSTETRICS & GYNECOLOGY

## 2018-03-19 NOTE — ASSESSMENT & PLAN NOTE
LMP 2014 Medical induced menopause     Pap 3/16/2017 Neg pap Neg HPV Due   Mammo  Normal per pt  Colonoscopy- Colon Cancer Followed by Harshil Sanon

## 2018-03-19 NOTE — PATIENT INSTRUCTIONS
Menopause   WHAT YOU NEED TO KNOW:   What is menopause? Menopause is a normal stage in a woman's life when her monthly periods stop  Menopause starts when the ovaries slowly stop making the female hormones estrogen and progesterone  After menopause, a woman is no longer able to become pregnant  A woman who has not had a period for a full year after the age of 39 is considered to be in menopause  Perimenopause is a stage before menopause that may cause signs and symptoms similar to menopause  Perimenopause can last an average of 4 to 5 years  What are the signs and symptoms of menopause? The signs and symptoms of menopause can be different from woman to woman:  · Menstrual period changes such as skipped periods or periods that are closer together, or lighter or heavier than usual    · Hot flashes (feeling warm, flushed, and sweaty)     · Mood changes such as irritability or decreased desire to have sex    · Breast changes such as tenderness or pain    · Hair changes such as thinning hair or increased hair on your face    · Vaginal changes such as increased dryness     · Urinary changes such as increased urinary tract infections (UTIs) or urgency (feeling that you need to urinate right away)    · Other symptoms such as headaches, trouble sleeping, fatigue, or heart palpitations (strong, fast heartbeats)  What do I need to know about menopause? · You can still get pregnant while you have periods  Continue to use birth control if you do not want to get pregnant  You may need to use birth control until it has been 1 year since your periods stopped  Ask your healthcare provider when you can stop using birth control to prevent pregnancy  · Hormone replacement therapy can be used to treat symptoms of menopause  Hormone replacement therapy (HRT) is medicine that replaces your low hormone levels  HRT contains estrogen and sometimes progestin  HRT has benefits and risks   HRT decreases your risk for bone fractures by helping to prevent osteoporosis  HRT also protects you from colon cancer  HRT may increase your risk for breast cancer, blood clots, heart disease, and stroke  Ask your healthcare provider if HRT is right for you  How can I live a healthy lifestyle during and after menopause? After menopause, your risk for heart disease and bone loss increases  Ask about these and other ways to stay healthy:  · Exercise regularly  Exercise helps you maintain a healthy weight  Exercise can also help to control your blood pressure and cholesterol levels  Include weight-bearing exercise for strong bones  Ask your healthcare provider about the best exercise plan for you  · Eat a variety of healthy foods  Include fruits, vegetables, whole grains (whole-wheat bread, pasta, and cereals), low-fat dairy, and lean protein foods (beans, poultry, and fish)  Limit foods high in sodium (salt)  Ask your healthcare provider for more information about a meal plan that is right for you  · Maintain a healthy weight  Check with your healthcare provider before you start any weight loss program      · Take supplements as directed  You may need extra calcium and vitamin D to help prevent osteoporosis  · Limit alcohol and caffeine  Alcohol and caffeine may worsen your symptoms  · Do not smoke  If you smoke, it is never too late to quit  You are more likely to have a heart attack, lung disease, blood clots, and cancer if you smoke  Ask your healthcare provider for information if you need help quitting  When should I contact my healthcare provider? · You have vaginal bleeding after menopause  · You have questions or concerns about your condition or care  CARE AGREEMENT:   You have the right to help plan your care  Learn about your health condition and how it may be treated  Discuss treatment options with your caregivers to decide what care you want to receive  You always have the right to refuse treatment   The above information is an  only  It is not intended as medical advice for individual conditions or treatments  Talk to your doctor, nurse or pharmacist before following any medical regimen to see if it is safe and effective for you  © 2017 [employer] Information is for End User's use only and may not be sold, redistributed or otherwise used for commercial purposes  All illustrations and images included in CareNotes® are the copyrighted property of [employer]  or [employer] Soni

## 2018-03-19 NOTE — PROGRESS NOTES
Assessment/Plan:    Encounter for gynecological examination  LMP 2014 Medical induced menopause     Pap 3/16/2017 Neg pap Neg HPV Due   Mammo  Normal per pt  Colonoscopy- Colon Cancer Followed by Amor Molina       Diagnoses and all orders for this visit:    Encounter for gynecological examination    Encounter for screening mammogram for malignant neoplasm of breast  -     Mammo screening bilateral w 3d & cad; Future          Subjective:      Patient ID: Leah Ballard is a 41 y o  female  LMP 2014 Medical induced menopause after chemo and radiation     Pap 3/16/2017 Neg pap Neg HPV  Giselle  Normal per pt  Colonoscopy- Followed by Keyon Hidalgo in [place] pt has history of colon cancer  Pt a non smoker  Non drinker      Sexually active rarely as per patient but no dyspareunia      Gynecologic Exam   The patient's pertinent negatives include no genital itching, genital lesions, genital odor, genital rash, pelvic pain, vaginal bleeding or vaginal discharge  Pertinent negatives include no abdominal pain, anorexia, back pain, chills, constipation, diarrhea, dysuria, fever, frequency, nausea, painful intercourse, sore throat, urgency or vomiting  She is sexually active  She is postmenopausal  Her past medical history is significant for an abdominal surgery  The following portions of the patient's history were reviewed and updated as appropriate:   She  has a past medical history of Cancer ([address] ); Colon cancer ([address] ); History of tear of meniscus of knee joint; Hyperlipidemia; and Tennis elbow    She   Patient Active Problem List    Diagnosis Date Noted    Encounter for gynecological examination 2018    Tear of meniscus of left knee 2018    Colon cancer ([address] ) 2017    Early menopause occurring in patient age younger than 39 years 2017    Hyperlipidemia 2017    Left hip pain 2017    Trochanteric bursitis of left hip 2017     She  has a past surgical history that includes Colon surgery; Meniscectomy (Left, 06/2016); Elbow surgery (Left, 2015); Breast surgery (Bilateral); and Knee surgery  Her family history includes Breast cancer in her maternal aunt and sister; Colon cancer in her family; Diabetes in her mother; Heart defect in her father; Heart failure in her mother; Hip fracture in her father; Lung cancer in her maternal grandmother  She  reports that she quit smoking about 11 years ago  She has never used smokeless tobacco  She reports that she drinks alcohol  She reports that she does not use drugs  Current Outpatient Prescriptions   Medication Sig Dispense Refill    atorvastatin (LIPITOR) 20 mg tablet Take 1 tablet (20 mg total) by mouth daily 90 tablet 3    meloxicam (MOBIC) 7 5 mg tablet Take 7 5 mg by mouth daily       No current facility-administered medications for this visit  Current Outpatient Prescriptions on File Prior to Visit   Medication Sig    atorvastatin (LIPITOR) 20 mg tablet Take 1 tablet (20 mg total) by mouth daily    meloxicam (MOBIC) 7 5 mg tablet Take 7 5 mg by mouth daily     No current facility-administered medications on file prior to visit  She is allergic to iodinated diagnostic agents       Review of Systems   Constitutional: Negative for activity change, appetite change, chills, fatigue and fever  HENT: Negative for rhinorrhea, sneezing and sore throat  Eyes: Negative for visual disturbance  Respiratory: Negative for cough, shortness of breath and wheezing  Cardiovascular: Negative for chest pain, palpitations and leg swelling  Gastrointestinal: Negative for abdominal distention, abdominal pain, anorexia, constipation, diarrhea, nausea and vomiting  Genitourinary: Negative for difficulty urinating, dysuria, frequency, pelvic pain, urgency and vaginal discharge  Musculoskeletal: Negative for back pain  Neurological: Negative for syncope and light-headedness           Objective:      BP 120/72   Ht 5' 3" (1 6 m)   Wt 74 4 kg (164 lb)   LMP 07/01/2014 (Within Days) Comment: Medical induced menopsuse   BMI 29 05 kg/m²          Physical Exam   Constitutional: She is oriented to person, place, and time  Genitourinary: Vagina normal and uterus normal  No breast swelling, tenderness, discharge or bleeding  There is no rash, tenderness, lesion or injury on the right labia  There is no rash, tenderness, lesion or injury on the left labia  Uterus is not deviated, not enlarged, not fixed and not tender  Cervix exhibits no motion tenderness, no discharge and no friability  Right adnexum displays no mass, no tenderness and no fullness  Left adnexum displays no mass, no tenderness and no fullness  No tenderness or bleeding in the vagina  No vaginal discharge found  Neurological: She is alert and oriented to person, place, and time

## 2018-03-20 ENCOUNTER — OFFICE VISIT (OUTPATIENT)
Dept: PHYSICAL THERAPY | Facility: CLINIC | Age: 48
End: 2018-03-20
Payer: COMMERCIAL

## 2018-03-20 DIAGNOSIS — Z98.890 STATUS POST ARTHROSCOPIC PARTIAL LATERAL MENISCECTOMY: Primary | ICD-10-CM

## 2018-03-20 PROCEDURE — 97112 NEUROMUSCULAR REEDUCATION: CPT

## 2018-03-20 PROCEDURE — 97110 THERAPEUTIC EXERCISES: CPT

## 2018-03-20 NOTE — PROGRESS NOTES
Daily Note     Today's date: 3/20/2018  Patient name: Manuela Malik  : 1970  MRN: [id number]  Referring provider: Francia Mcdonald MD  Dx:   Encounter Diagnosis     ICD-10-CM    1  Status post arthroscopic partial lateral meniscectomy [id number] [id number]        Start Time: 08          Subjective: Pt denies any pain upon arrival this morning  She states that she has been feeling really good lately         Objective: See treatment diary below  Precautions: Hx of colon cancer        Exercise Diary  2/14  2/16  2/20  2/22  3/1  3/6  3/8  3/20       Quad sets 5 sec hold x 20  5 sec hold x 20  5 sec hold X20  5 sec hold X20   5 sec hold X20   5 sec hold X20 5 sec hold X20  5" 20x       Calf stretch with strap 5 x 30 sec  5 x 30 sec  5X 30sec  5X 30sec  5X 30sec   5X 30sec   5X 30sec  30" 5x       Hamstring stretch with strap 5 x 30 sec  5 x 30 sec  5X 30sec  5X 30sec  5X 30sec  NP   5X 30sec  30" 5x       SLR FLX/ABD 10x  10x  10x  10x 1#  10x 1 5#  10x 2 0lbs  10x 2 0lbs  2# 10x       Benders of jewell 15x  15x  15X 15X NP  NP  NP  NP       Heel prop 5 min  NP  NP  NP  NP NP  NP  NP        wall slides for knee flexion   15x  15X  15x  15x  20x  20x  20x        heel raises    20x  20X 20X  20X    20X  20x        left hip abd/ext   20x  20X  20X  20X  20x GTB   20x GTB  GTB 20x        total gym         level 3  2 x 10  NP  level 4 2 x 10   level 3 2 x 10  L 8 3x10        heel slides with strap          15x 20x  20x  20x        TKE          GTB 2 x 10 GTB 2 x 10  BTB 2 x 10  BTB 2x10       Forward step ups          4 in 2 x 10  4 in 2 x 10  6 in 2 x 10  6" 2x10        lateral step ups          4 in 2 x 10   4 in 2 x 10 6 in 2 x 10  6" 2x10        resisted lateral walking         RTB 5 laps  GTB 5 laps  GTB 5 laps  GTB 5laps       single leg stance on airex           5 sec hold xx 5  10 sec x 5  10" 10x                                                                                                                 Assessment: Pt had no pain throughout nor post tx  She reported feeling great and ready to DC to [facility]  She was educated on [facility]P including stretches to continue to improve ROM and decrease tension post activity  Pt was DC to [facility]  Plan: Potential discharge next visit

## 2018-03-22 ENCOUNTER — APPOINTMENT (OUTPATIENT)
Dept: PHYSICAL THERAPY | Facility: CLINIC | Age: 48
End: 2018-03-22
Payer: COMMERCIAL

## 2018-03-27 ENCOUNTER — APPOINTMENT (OUTPATIENT)
Dept: PHYSICAL THERAPY | Facility: CLINIC | Age: 48
End: 2018-03-27
Payer: COMMERCIAL

## 2018-03-29 ENCOUNTER — APPOINTMENT (OUTPATIENT)
Dept: PHYSICAL THERAPY | Facility: CLINIC | Age: 48
End: 2018-03-29
Payer: COMMERCIAL

## 2018-05-03 ENCOUNTER — HOSPITAL ENCOUNTER (OUTPATIENT)
Dept: MAMMOGRAPHY | Facility: CLINIC | Age: 48
Discharge: HOME | End: 2018-05-03
Payer: COMMERCIAL

## 2018-05-03 DIAGNOSIS — Z12.31 ENCOUNTER FOR SCREENING MAMMOGRAM FOR MALIGNANT NEOPLASM OF BREAST: ICD-10-CM

## 2018-05-03 PROCEDURE — 77067 SCR MAMMO BI INCL CAD: CPT

## 2018-05-03 PROCEDURE — 77063 BREAST TOMOSYNTHESIS BI: CPT

## 2018-05-18 NOTE — PROGRESS NOTES
PT Discharge    Today's date: 2018  Patient name: Mary Mulligan  : 1970  MRN: [id number]  Referring provider: Kenneth Barnes MD  Dx:   Encounter Diagnosis   Name Primary?  Status post arthroscopic partial lateral meniscectomy Yes       Start Time: 802  Stop Time: 846  Total time in clinic (min): 44 minutes    Assessment  Impairments: abnormal gait, abnormal muscle firing, abnormal or restricted ROM, impaired physical strength, pain with function and weight-bearing intolerance    Assessment details: Patient wishes to be discharged from skilled PT services following this visit        Understanding of Dx/Px/POC: good   Prognosis: good    Goals  Short term goals:  1) Patient will demonstrate left quad set of goof in 4 weeks- unable to be assessed  2) Patient will demonstrate knee flexion >115 in 4 weeks- unable to be assessed  3) Patient will demonstrate ability to reach terminal left knee extes in 4 weeks-unable to be assessed    Long term goals:  1) Patient will ability to ambulate 30 minutes with AD in 6 weeks-unable to be assessed  2) Patient will demonstrate ability to manage stairs with step over step gait pattern in 6 weeks-unable to be assessed  3) Patient will demonstrate ability to squat with good form and without pain in left knee in 6 weeks-unable to be assessed      Plan  Patient would benefit from: skilled PT  Planned modality interventions: H-Wave, cryotherapy, TENS, unattended electrical stimulation and electrical stimulation/Russian stimulation  Planned therapy interventions: therapeutic training, therapeutic exercise, therapeutic activities, stretching, strengthening, transfer training, postural training, patient education, neuromuscular re-education, muscle pump exercises, massage, home exercise program, graded exercise, gait training, graded activity, functional ROM exercises, flexibility, body mechanics training, balance/weight bearing training, balance and ADL training  Frequency: 2x week  Duration in weeks: 6        Subjective Evaluation    History of Present Illness  Date of surgery: 2018  Pain  Current pain ratin  At best pain ratin  At worst pain ratin  Location: Left posterior knee- intermittent, varying, deep  Quality: pulling    Social Support  Steps to enter house: no  Stairs in house: yes (14 steps handrail on right ascending)   Lives in: multiple-level home  Lives with: spouse    Employment status: working (mostly sitting)  Exercise history: patient reports that she has been avoiding going grocery shopping, and going into the office for work  Patient reports that she likes traveling and hiking  Diagnostic Tests  MRI studies: abnormal    [facility] comments: MRI that revealed meniscus tear  Treatments  Previous treatment: medication        Objective     Static Posture     Comments  Decreased weight-bearing on left lower extremity in static standing, decreased left knee extension, left femoral internal rotation  Observations   Left Knee   Positive for effusion and incision  Right Knee   Negative for effusion  Additional Observation Details  Two anterior knee surgical incisions on left knee    Active Range of Motion   Left Knee   Flexion: 85 degrees   Extension: -2 degrees     Right Knee   Flexion: 128 degrees   Extension: Right knee active extension: 5HE  Passive Range of Motion   Left Knee   Flexion: 90 degrees   Extension: -2 degrees     Right Knee   Extension: Right knee passive extension: 3HE  Mobility   Patellar Mobility:   Left Knee   WFL: medial, lateral, superior and inferior  Right Knee   WFL: medial, lateral, superior and inferior    Strength/Myotome Testing     Left Knee   Quadriceps contraction: poor    Right Knee   Flexion: 5  Extension: 5  Quadriceps contraction: good    Ambulation     Observational Gait   Decreased walking speed       Additional Observational Gait Details  Decreased left knee flexion during swing phase, trace reverse trendelenburg to left  Ambulation assessed without straight cane  Patient presented to clinic with single point cane this visit  Norman Stager was sized appropriately         Precautions: Hx of colon cancer    Daily Treatment Diary     Manual  2/14            Left knee PROM TK                                                                    Exercise Diary  2/14            Quad sets 5 sec hold x 20            Calf stretch with strap 5 x 30 sec            Hamstring stretch with strap 5 x 30 sec            SLR 10x            Benders of jewell 15x            Heel prop 5 min                                                                                                                                                                                                      Modalities

## 2018-12-12 ENCOUNTER — OFFICE VISIT (OUTPATIENT)
Dept: VASCULAR SURGERY | Facility: CLINIC | Age: 48
End: 2018-12-12
Payer: COMMERCIAL

## 2018-12-12 VITALS
DIASTOLIC BLOOD PRESSURE: 78 MMHG | SYSTOLIC BLOOD PRESSURE: 108 MMHG | WEIGHT: 169 LBS | HEIGHT: 64 IN | HEART RATE: 84 BPM | TEMPERATURE: 98.3 F | BODY MASS INDEX: 28.85 KG/M2

## 2018-12-12 DIAGNOSIS — I83.812 VARICOSE VEINS OF LEFT LOWER EXTREMITY WITH PAIN: Primary | ICD-10-CM

## 2018-12-12 PROCEDURE — 99204 OFFICE O/P NEW MOD 45 MIN: CPT | Performed by: PHYSICIAN ASSISTANT

## 2018-12-12 NOTE — ASSESSMENT & PLAN NOTE
Bilateral varicose veins with left leg pain      42 y/o F colon CA 2014 s/p chemo/rxn, HLD, L hip pain/L bursitis, Hx L knee meniscectomy  X 2  Sukhjinder Mckay complains of left leg pain which began after diagnosis of colon cancer  She c/o LEFT lower extremity pain from the distal thigh down the leg  The legs feel the best in the morning but gets tired throughout the day  She complains of LEFT leg tired, achy and crampy legs  She also has some numbness and shooting pains at the lateral knee  She reports that she has seen other doctors and went to therapy without relief in symptoms  She recently started wearing generic compression which have helped her symptoms to a degree which prompted the visit today  She has chronic L knee swelling since 2014  Her last arthroscopic meniscal surgery was 1/2018  Sukhjinder Mckay enjoys exercise and is motivated to pursue a healthy lifestyle  2+ Fem, 2+ DP pulses; + bilateral VV which appears to be up to about 0 5 cm wide; non-tender    Discussion:    We had a detailed discussion regarding her history and symptoms  I am not entirely sure that there isn't an underlying musculoskeletal component or other damage leading to symptoms, but she does describe many symptoms classic for venous disease  She also has venous varicosities as well as family history of varicose veins  Trial of conservative measures for treatment of venous insufficiency and see how she does  She was given a script for formal graded compression   We will follow up in about 3 months

## 2018-12-12 NOTE — LETTER
December 12, 2018     Pacheco Mckeon DO  [address] [id number]    Patient: Bernie Amanda   YOB: 1970   Date of Visit: 12/12/2018     Dear Dr Nery Rose      Thank you for referring Bernie Amanda to me for evaluation  Below are the relevant portions of my assessment and plan of care  If you have questions, please do not hesitate to call me  I look forward to following John Mackay along with you  Sincerely,        Allison Mcrae PA-C        CC: No Recipients    Progress Notes:    Varicose veins of left lower extremity with pain  Bilateral varicose veins with left leg pain      40 y/o F colon CA 2014 s/p chemo/rxn, HLD, L hip pain/L bursitis, Hx L knee meniscectomy  X 2  John Mackay complains of left leg pain which began after diagnosis of colon cancer  She c/o LEFT lower extremity pain from the distal thigh down the leg  The legs feel the best in the morning but gets tired throughout the day  She complains of LEFT leg tired, achy and crampy legs  She also has some numbness and shooting pains at the lateral knee  She reports that she has seen other doctors and went to therapy without relief in symptoms  She recently started wearing generic compression which have helped her symptoms to a degree which prompted the visit today  She has chronic L knee swelling since 2014  Her last arthroscopic meniscal surgery was 1/2018  John Mackay enjoys exercise and is motivated to pursue a healthy lifestyle  2+ Fem, 2+ DP pulses; + bilateral VV which appears to be up to about 0 5 cm wide; non-tender    Discussion:    We had a detailed discussion regarding her history and symptoms  I am not entirely sure that there isn't an underlying musculoskeletal component or other damage leading to symptoms, but she does describe many symptoms classic for venous disease  She also has venous varicosities as well as family history of varicose veins      Trial of conservative measures for treatment of venous insufficiency and see how she does  She was given a script for formal graded compression   We will follow up in about 3 months

## 2018-12-12 NOTE — PATIENT INSTRUCTIONS
Bilateral varicose veins with left leg pain    40 y/o F colon CA 2014 s/p chemo/rxn, HLD, L hip pain/L bursitis, Hx L knee meniscectomy  X 2  Merry Zhang complains of left leg pain which began after diagnosis of colon cancer  She c/o LEFT lower extremity pain from the distal thigh down the leg  The legs feel the best in the morning but gets tired throughout the day  She complains of LEFT leg tired, achy and crampy legs  She also has some numbness and shooting pains at the lateral knee  She recently started wearing some generic compression which have helped her symptoms to a degree which prompted the visit today  She has chronic L knee swelling since 2014  Her last arthroscopic meniscal surgery was 1/2018  Merry Zhang enjoys exercise and is motivated to pursue a healthy lifestyle  2+ Fem, 2+ DP pulses; + bilateral VV which appears to be up to about 0 5 cm wide    Discussion:    We had a detailed discussion regarding her history and symptoms  I am not entirely sure that there isn't an underlying musculoskeletal component to her symptoms, but she does describe many symptoms classic for venous disease  She also has venous varicosities as well as family history of varicose veins  Trial of conservative measures for treatment of venous insufficiency and see how she does  She was given a script for formal graded compression  We will follow up in about 3 months     It was a pleasure to see you in the office today  Varicose veins   Recommendations:  - Order for prescription graded compression stockings of 20-30 mm Hg  - Wear stocking EVERY day to help control swelling in legs and remove at night  - Elevate legs while at rest  - Continue healthy life-style changes - heart-healthy diet and regular exercise  - Patient education for venous insufficiency  - Follow up in 3 months or as needed for symptoms - swelling, pain, fatigue, aching, heaviness

## 2018-12-12 NOTE — PROGRESS NOTES
Assessment/Plan:    Varicose veins of left lower extremity with pain  Bilateral varicose veins with left leg pain      40 y/o F colon CA 2014 s/p chemo/rxn, HLD, L hip pain/L bursitis, Hx L knee meniscectomy  X 2  Juan Carlos Jauregui complains of left leg pain which began after diagnosis of colon cancer  She c/o LEFT lower extremity pain from the distal thigh down the leg  The legs feel the best in the morning but gets tired throughout the day  She complains of LEFT leg tired, achy and crampy legs  She also has some numbness and shooting pains at the lateral knee  She reports that she has seen other doctors and went to therapy without relief in symptoms  She recently started wearing generic compression which have helped her symptoms to a degree which prompted the visit today  She has chronic L knee swelling since 2014  Her last arthroscopic meniscal surgery was 1/2018  Juan Carlos Jauregui enjoys exercise and is motivated to pursue a healthy lifestyle  2+ Fem, 2+ DP pulses; + bilateral VV which appears to be up to about 0 5 cm wide; non-tender    Discussion:    We had a detailed discussion regarding her history and symptoms  I am not entirely sure that there isn't an underlying musculoskeletal component or other damage leading to symptoms, but she does describe many symptoms classic for venous disease  She also has venous varicosities as well as family history of varicose veins  Trial of conservative measures for treatment of venous insufficiency and see how she does  She was given a script for formal graded compression  We will follow up in about 3 months            Subjective:      Patient ID: Facundo Uriarte is a 41 y o  female  Patient is a new pt to office  Pt is here for Left leg pain  Pt has hx of colon cancer  She says that the pain started during chemo treatments  Pt states that the pain in her leg started in 2014  Pt started to wear compression stockings and says that wearing them helped   Pt says that she does have some varicose veins  Pt says that she has shooting, throbbing pain that goes from behind left knee to the ankle  She says that the pain is there wether she is walking or at rest  Pt must prop her leg at night to get relief so that she can sleep  She has left knee swelling  She says that the pain wakes her up at night  She has numbness and tingling in the left calf  P Pt is currently taking Atorvastatin 20 mg  She is a former smoker  RE:  LEFT leg pain and heaviness   HPI  Kylah Bautista 42 y/o F colon CA 2014 s/p chemo/rxn, HLD, L hip pain/L bursitis, Hx L knee meniscectomy  X 2  Marcy Decker complains of left leg pain which began after diagnosis of colon cancer  She c/o LEFT lower extremity pain from the distal thigh down the leg  The legs feel the best in the morning but gets tired throughout the day  She complains of LEFT leg tired, achy and crampy legs  She also has some numbness and shooting pains at the lateral knee  She recently started wearing some generic compression which have helped her symptoms to a degree which prompted the visit today  She has chronic L knee swelling since 2014  Her last arthroscopic meniscal surgery was 1/2018  Marcy Decker enjoys exercise and is motivated to pursue a healthy lifestyle  Rx: atorvastatin 20 and meloxicam 7 5    The following portions of the patient's history were reviewed and updated as appropriate: allergies, current medications, past family history, past medical history, past social history, past surgical history and problem list   Active; no cp/sob  No s/sx claudication  Remote smoker  Review of Systems   Constitutional: Negative  Negative for chills  HENT: Negative  Eyes: Negative  Respiratory: Negative  Cardiovascular: Positive for leg swelling (left knee)  Gastrointestinal: Negative  Endocrine: Negative  Genitourinary: Negative  Musculoskeletal: Positive for joint swelling  Leg pain   Skin: Negative  Negative for wound     Allergic/Immunologic: Positive for environmental allergies  Neurological: Positive for numbness  Hematological: Negative  Psychiatric/Behavioral: The patient is nervous/anxious  Objective:    /78 (BP Location: Left arm, Patient Position: Sitting, Cuff Size: Standard)   Pulse 84   Temp 98 3 °F (36 8 °C) (Tympanic)   Ht 5' 4" (1 626 m)   Wt 76 7 kg (169 lb)   LMP 07/01/2014 (Within Days) Comment: Medical induced menopsuse   BMI 29 01 kg/m²       LEFT lateral leg      L calf        R lateral lower leg    R calf         Physical Exam   Constitutional: She is oriented to person, place, and time  She appears well-developed and well-nourished  She is cooperative  HENT:   Head: Normocephalic and atraumatic  Eyes: Pupils are equal, round, and reactive to light  EOM are normal    Neck: Trachea normal  Neck supple  No JVD present  No thyromegaly present  Cardiovascular: Normal rate, regular rhythm, S1 normal, S2 normal and normal heart sounds  Exam reveals no gallop and no friction rub  No murmur heard  Pulses:       Carotid pulses are 2+ on the right side, and 2+ on the left side  Radial pulses are 2+ on the right side, and 2+ on the left side  Femoral pulses are 2+ on the right side, and 2+ on the left side  Dorsalis pedis pulses are 2+ on the right side, and 2+ on the left side  + varicose veins - non-tender   Pulmonary/Chest: Effort normal and breath sounds normal  No accessory muscle usage  No respiratory distress  She has no wheezes  She has no rales  Abdominal: Soft  Bowel sounds are normal  She exhibits no distension  There is no hepatosplenomegaly  There is no tenderness  Musculoskeletal: Normal range of motion  She exhibits edema (mild L)  She exhibits no deformity  Neurological: She is alert and oriented to person, place, and time  Grossly normal    Skin: Skin is warm and dry  No lesion and no rash noted  No cyanosis  Nails show no clubbing     Psychiatric: She has a normal mood and affect  Nursing note and vitals reviewed          Vitals:    12/12/18 1506   BP: 108/78   BP Location: Left arm   Patient Position: Sitting   Cuff Size: Standard   Pulse: 84   Temp: 98 3 °F (36 8 °C)   TempSrc: Tympanic   Weight: 76 7 kg (169 lb)   Height: 5' 4" (1 626 m)       Patient Active Problem List   Diagnosis    Colon cancer ([address] )    Early menopause occurring in patient age younger than 39 years   [facility] Hyperlipidemia    Left hip pain    Tear of meniscus of left knee    Trochanteric bursitis of left hip    Encounter for gynecological examination    Varicose veins of left lower extremity with pain       Past Surgical History:   Procedure Laterality Date    BREAST SURGERY Bilateral     RECONSTRUCION WITH IMPLANT PROTHESIS     COLON SURGERY      ELBOW SURGERY Left 2015    Tennis elbow    KNEE SURGERY      MENISCECTOMY Left 06/2016       Family History   Problem Relation Age of Onset    Diabetes Mother     Heart failure Mother     Heart defect Father         CARDIAC PACEMAKER    Hip fracture Father         HIP REPLACEMENT    Breast cancer Sister     Lung cancer Maternal Grandmother     Colon cancer Family         AUNT (NOT SPECIFIED)    Breast cancer Maternal Aunt        Social History     Social History    Marital status: /Civil Union     Spouse name: N/A    Number of children: N/A    Years of education: N/A     Occupational History    EMPLOYED      Social History Main Topics    Smoking status: Former Smoker     Quit date: 2007    Smokeless tobacco: Never Used    Alcohol use Yes      Comment: Occasionally ALL SCRIPTS SAYS NO    Drug use: No    Sexual activity: Not on file     Other Topics Concern    Not on file     Social History Narrative    No narrative on file       Allergies   Allergen Reactions    Iodinated Diagnostic Agents          Current Outpatient Prescriptions:     atorvastatin (LIPITOR) 20 mg tablet, Take 1 tablet (20 mg total) by mouth daily, Disp: 90 tablet, Rfl: 3    Elastic Bandages & Supports (MEDICAL COMPRESSION STOCKINGS) MISC, by Does not apply route daily Knee High 20-30mmHg, Disp: 2 each, Rfl: 2    meloxicam (MOBIC) 7 5 mg tablet, Take 7 5 mg by mouth daily, Disp: , Rfl:

## 2019-03-11 ENCOUNTER — OFFICE VISIT (OUTPATIENT)
Dept: VASCULAR SURGERY | Facility: CLINIC | Age: 49
End: 2019-03-11
Payer: COMMERCIAL

## 2019-03-11 VITALS
HEIGHT: 64 IN | SYSTOLIC BLOOD PRESSURE: 110 MMHG | HEART RATE: 76 BPM | DIASTOLIC BLOOD PRESSURE: 80 MMHG | WEIGHT: 166 LBS | BODY MASS INDEX: 28.34 KG/M2 | TEMPERATURE: 97.6 F

## 2019-03-11 DIAGNOSIS — I83.812 VARICOSE VEINS OF LEFT LOWER EXTREMITY WITH PAIN: Primary | ICD-10-CM

## 2019-03-11 PROCEDURE — 99213 OFFICE O/P EST LOW 20 MIN: CPT | Performed by: PHYSICIAN ASSISTANT

## 2019-03-11 NOTE — PROGRESS NOTES
Assessment/Plan:    Varicose veins of left lower extremity with pain  58 y/o F colon CA 2014 s/p chemo/rxn, HLD, L hip pain/L bursitis, Hx L knee meniscectomy  X 2  Surekha Sarabia comes in for 3 month follow up of symptomatic varicose veins with history of moderate to severe LEFT leg symptoms  Left leg pain may be somewhat mixed from muskuloskeletal and vein disease  The legs feel the best in the morning but gets tired throughout the day  She has had complains of LEFT leg tired, achy and crampy legs  Further, she has had night cramps particularly after active days  She also has LEFT lower extremity shooting pain and numbness down the leg to the lateral knee  Surekha Sarabia was seen in Dec, 2018 at which time we discussed conservative measures for treatment of varicose veins and venous insufficiency  Surekha Sarabia has been wearing formal, graded compression stockings for the past 3 months with good results  She was formally fit at local [employer] where she purchased one pair and then she got 7 more pairs of compression online/ She is wearing stockings in office today  The legs feel much better on a daily basis  She works in [employer] and sits at [facility] all day  The legs generally are good without significant tired and cramping  She no longer has night cramping  Surekha Sarabia is rather active and enjoys exercise  After a day of moderate exercise, she still may get tired and aching legs, but she doesn't feel it merits discussion of surgery  We will continue with conservative measures and see her back as needed, if symptoms worsen      -Continue with conservative measures, including compression  -Patient education for varicose veins  -Follow up PRN  Subjective:      Patient ID: Mary Mulligan is a 57 y o  female  Patient returns to the office today for 3 month follow up visit to discuss varicose veins  Patient states that pain, swelling, aching and heaviness have improved with the use of compression   She states that she is also able to sleep much better  She has been wearing compression stockings regularly  HPI   Ness Luna 56 y/o F colon CA 2014 s/p chemo/rxn, HLD, L hip pain/L bursitis, Hx L knee meniscectomy  X 2  Luis Valentine comes in for follow up of symptomatic varicose veins with symptoms worse on LEFT  Left leg pain may be somewhat mixed from muskuloskeletal and vein disease  She reports that symptoms began after diagnosis of colon cancer  She c/o LEFT lower extremity pain from the distal thigh down the leg  The legs feel the best in the morning but gets tired throughout the day  She complains of LEFT leg tired, achy and crampy legs  She also has some numbness and shooting pains at the lateral knee  She reports that she has seen other doctors and went to therapy without relief in symptoms  Luis Valentine has been wearing formal, graded compression stockings for the past 3 months with good results  The following portions of the patient's history were reviewed and updated as appropriate: allergies, current medications, past family history, past medical history, past social history, past surgical history and problem list     ROS was reviewed by me  Remains active  Uses punching bag to work up  Plan for travel next month to [place]  Reminded her to wear compression on plane  She is a bit concerned about wearing compression in the summer which we also discussed  Review of Systems   Constitutional: Negative  HENT: Negative  Eyes: Negative  Respiratory: Negative  Cardiovascular: Negative  Gastrointestinal: Negative  Endocrine: Negative  Genitourinary: Negative  Musculoskeletal: Negative  Skin: Negative  Allergic/Immunologic: Negative  Neurological: Negative  Hematological: Negative  Psychiatric/Behavioral: Negative            Objective:      /80 (BP Location: Right arm, Patient Position: Sitting)   Pulse 76   Temp 97 6 °F (36 4 °C) (Tympanic)   Ht 5' 4" (1 626 m)   Wt 75 3 kg (166 lb)   LMP 07/01/2014 (Within Days) Comment: Medical induced menopsuse   BMI 28 49 kg/m²      Physical Exam   Constitutional: She is oriented to person, place, and time  She appears well-developed and well-nourished  She is cooperative  HENT:   Head: Normocephalic and atraumatic  Eyes: Pupils are equal, round, and reactive to light  EOM are normal    Neck: Trachea normal  Neck supple  No JVD present  No thyromegaly present  Cardiovascular: Normal rate, regular rhythm, S1 normal, S2 normal and normal heart sounds  Exam reveals no gallop and no friction rub  No murmur heard  Pulses:       Carotid pulses are 2+ on the right side, and 2+ on the left side  Radial pulses are 2+ on the right side, and 2+ on the left side  Dorsalis pedis pulses are 2+ on the right side, and 2+ on the left side  + mostly smaller varicose veins - images taken at last visit  Good 2+ DP pulses     Pulmonary/Chest: Effort normal and breath sounds normal  No accessory muscle usage  No respiratory distress  She has no wheezes  She has no rales  Abdominal: Soft  Bowel sounds are normal  She exhibits no distension  There is no hepatosplenomegaly  There is no tenderness  Musculoskeletal: Normal range of motion  She exhibits edema (trace L ankle)  She exhibits no deformity  Neurological: She is alert and oriented to person, place, and time  Grossly normal    Skin: Skin is warm and dry  No lesion and no rash noted  No cyanosis  Nails show no clubbing  Psychiatric: She has a normal mood and affect  Nursing note and vitals reviewed  I have reviewed and made appropriate changes to the review of systems input by the medical assistant      Vitals:    03/11/19 0913   BP: 110/80   BP Location: Right arm   Patient Position: Sitting   Pulse: 76   Temp: 97 6 °F (36 4 °C)   TempSrc: Tympanic   Weight: 75 3 kg (166 lb)   Height: 5' 4" (1 626 m)       Patient Active Problem List   Diagnosis    [address] [address] )    Early menopause occurring in patient age younger than 39 years   [place]  Hyperlipidemia    Left hip pain    Tear of meniscus of left knee    Trochanteric bursitis of left hip    Encounter for gynecological examination    Varicose veins of left lower extremity with pain       Past Surgical History:   Procedure Laterality Date    BREAST SURGERY Bilateral     RECONSTRUCION WITH IMPLANT PROTHESIS     COLON SURGERY      ELBOW SURGERY Left 2015    Tennis elbow    KNEE SURGERY      MENISCECTOMY Left 2016       Family History   Problem Relation Age of Onset    Diabetes Mother     Heart failure Mother     Heart defect Father         CARDIAC PACEMAKER    Hip fracture Father         HIP REPLACEMENT    Breast cancer Sister     Lung cancer Maternal Grandmother     Colon cancer Family         AUNT (NOT SPECIFIED)    Breast cancer Maternal Aunt        Social History     Socioeconomic History    Marital status: /Civil Union     Spouse name: Not on file    Number of children: Not on file    Years of education: Not on file    Highest education level: Not on file   Occupational History    Occupation: EMPLOYED   Social Needs    Financial resource strain: Not on file    Food insecurity:     Worry: Not on file     Inability: Not on file    Transportation needs:     Medical: Not on file     Non-medical: Not on file   Tobacco Use    Smoking status: Former Smoker     Last attempt to quit:      Years since quittin 1    Smokeless tobacco: Never Used   Substance and Sexual Activity    Alcohol use: Yes     Comment: Occasionally ALL SCRIPTS SAYS NO    Drug use: No    Sexual activity: Not on file   Lifestyle    Physical activity:     Days per week: Not on file     Minutes per session: Not on file    Stress: Not on file   Relationships    Social connections:     Talks on phone: Not on file     Gets together: Not on file     Attends Advent service: Not on file     Active member of club or organization: Not on file     Attends meetings of clubs or organizations: Not on file     Relationship status: Not on file    Intimate partner violence:     Fear of current or ex partner: Not on file     Emotionally abused: Not on file     Physically abused: Not on file     Forced sexual activity: Not on file   Other Topics Concern    Not on file   Social History Narrative    Not on file       Allergies   Allergen Reactions    Iodinated Diagnostic Agents          Current Outpatient Medications:     atorvastatin (LIPITOR) 20 mg tablet, Take 1 tablet (20 mg total) by mouth daily, Disp: 90 tablet, Rfl: 3    Elastic Bandages & Supports ([address]) MISC, by Does not apply route daily Knee High 20-30mmHg, Disp: 2 each, Rfl: 2

## 2019-03-11 NOTE — ASSESSMENT & PLAN NOTE
56 y/o F colon CA 2014 s/p chemo/rxn, HLD, L hip pain/L bursitis, Hx L knee meniscectomy  X 2  Peter Clarke comes in for 3 month follow up of symptomatic varicose veins with history of moderate to severe LEFT leg symptoms  Left leg pain may be somewhat mixed from muskuloskeletal and vein disease  The legs feel the best in the morning but gets tired throughout the day  She has had complains of LEFT leg tired, achy and crampy legs  Further, she has had night cramps particularly after active days  She also has LEFT lower extremity shooting pain and numbness down the leg to the lateral knee  Peter Clarke was seen in Dec, 2018 at which time we discussed conservative measures for treatment of varicose veins and venous insufficiency  Peter Clarke has been wearing formal, graded compression stockings for the past 3 months with good results  She was formally fit at local [facility] where she purchased one pair and then she got 7 more pairs of compression online/ She is wearing stockings in office today  The legs feel much better on a daily basis  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

## 2019-03-11 NOTE — LETTER
March 11, 2019     Tevin Anne DO  [address] [place] [id number]    Patient: Naveen Bright   YOB: 1970   Date of Visit: 3/11/2019     Dear Dr Marisa Chahal      Thank you for referring Naveen Bright to me for evaluation  Below are the relevant portions of my assessment and plan of care  If you have questions, please do not hesitate to call me  I look forward to following Ben Landers along with you  Sincerely,        Lala Escobar PA-C        CC: No Recipients    Progress Notes:      Assessment/Plan:    Varicose veins of left lower extremity with pain  56 y/o F colon CA 2014 s/p chemo/rxn, HLD, L hip pain/L bursitis, Hx L knee meniscectomy  X 2  Ben Landers comes in for 3 month follow up of symptomatic varicose veins with history of moderate to severe LEFT leg symptoms  Left leg pain may be somewhat mixed from muskuloskeletal and vein disease  The legs feel the best in the morning but gets tired throughout the day  She has had complains of LEFT leg tired, achy and crampy legs  Further, she has had night cramps particularly after active days  She also has LEFT lower extremity shooting pain and numbness down the leg to the lateral knee  Ben Landers was seen in Dec, 2018 at which time we discussed conservative measures for treatment of varicose veins and venous insufficiency  Ben Landers has been wearing formal, graded compression stockings for the past 3 months with good results  She was formally fit at local [employer] where she purchased one pair and then she got 7 more pairs of compression online/ She is wearing stockings in office today  The legs feel much better on a daily basis  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

## 2019-03-11 NOTE — PATIENT INSTRUCTIONS
Varicose veins with symptoms LEFT lower extremity    Rome Pace is feeling much better with good results using daily compression  Still some symptoms of tired legs after moderate activity but on most days she tolerates the day without pain  Keep up the good attitude  We will see you back as needed if symptoms worsen in which case we can discuss surgical options  Recommendations:  - Continue graded compression stockings of 20-30 mm Hg  - Wear stocking EVERY day to help control swelling in legs and remove at night  - Elevate legs while at rest  - Continue healthy life-style changes; heart-healthy, low sodium diet; regular exercise for weight loss  - Patient education for venous insufficiency  Follow up as needed for any worsening of symptoms - swelling, pain, fatigue, aching, heaviness  Varicose Veins, Ambulatory Care   GENERAL INFORMATION:   Varicose veins  are veins that become large, twisted, and swollen  They are common on the back of your calves, knees, and thighs  Common symptoms include the following:   · Blue, purple, or bulging veins in your legs     · Pain, swelling, or muscle cramps in your legs    · Feeling of heaviness in your legs  Seek immediate care for the following symptoms:   · A wound that does not heal or is infected    · An injury that has broken your skin and caused your varicose veins to bleed    · Swollen and hard leg    · Pain in your leg that does not go away or gets worse    · Legs or feet turn blue or black    · Warmth, tenderness, and pain in your leg (may also look swollen and red)  Treatment of varicose veins  aims to decrease symptoms, improve appearance, and prevent further problems  Treatment will depend on which veins are affected and how severe your condition is  Prescription pain medicine may be given  Ask how to take this medicine safely  Procedures may be done to remove your varicose veins   Your healthcare provider may inject a solution or use a laser to close the varicose veins  Surgery to remove long veins may also be done  Ask your healthcare provider for more information about procedures used in treating varicose veins  Manage varicose veins:   · Wear pressure stockings  The stockings are tight and put pressure on your legs  They improve blood flow and help prevent clots  · Elevate  your legs above the level of your heart for 15 to 30 minutes several times a day  This will help blood to flow back to your heart  · Avoid sitting or standing for long periods of time  This can cause the blood to collect in your legs and make your symptoms worse  Walk around for a few minutes every hour to get blood moving in your legs  · Avoid wearing tight clothing and shoes  Avoid wearing high-heeled shoes  Do not wear clothes that are tight around the waist     · Get plenty of exercise  Talk to your healthcare provider about the best exercise plan for you  Exercise can decrease your blood pressure and improve your health  Bend or rotate your ankles several times every hour  This will help blood to flow back to the heart  · Maintain a healthy weight  Your heart works harder when you are overweight and this can make varicose vein worse  Ask how much you should weigh  Ask him to help you create a weight loss plan if you are overweight  · Do not smoke  If you smoke, it is never too late to quit  Ask for information if you need help quitting  Follow up with your healthcare provider as directed:  Write down your questions so you remember to ask them during your visits  CARE AGREEMENT:   You have the right to help plan your care  Learn about your health condition and how it may be treated  Discuss treatment options with your caregivers to decide what care you want to receive  You always have the right to refuse treatment  The above information is an  only  It is not intended as medical advice for individual conditions or treatments   Talk to your doctor, nurse or pharmacist before following any medical regimen to see if it is safe and effective for you  © 2014 [address] is for End User's use only and may not be sold, redistributed or otherwise used for commercial purposes  All illustrations and images included in CareNotes® are the copyrighted property of [employer]  or [employer] Soni

## 2019-06-17 ENCOUNTER — ANNUAL EXAM (OUTPATIENT)
Dept: OBGYN CLINIC | Facility: CLINIC | Age: 49
End: 2019-06-17
Payer: COMMERCIAL

## 2019-06-17 VITALS
WEIGHT: 164 LBS | BODY MASS INDEX: 29.06 KG/M2 | SYSTOLIC BLOOD PRESSURE: 138 MMHG | HEIGHT: 63 IN | DIASTOLIC BLOOD PRESSURE: 64 MMHG

## 2019-06-17 DIAGNOSIS — Z01.411 ENCOUNTER FOR GYNECOLOGICAL EXAMINATION WITH ABNORMAL FINDING: Primary | ICD-10-CM

## 2019-06-17 DIAGNOSIS — C18.9 MALIGNANT NEOPLASM OF COLON, UNSPECIFIED PART OF COLON (HCC): ICD-10-CM

## 2019-06-17 DIAGNOSIS — N95.0 POSTMENOPAUSAL BLEEDING: ICD-10-CM

## 2019-06-17 DIAGNOSIS — Z12.31 ENCOUNTER FOR SCREENING MAMMOGRAM FOR MALIGNANT NEOPLASM OF BREAST: ICD-10-CM

## 2019-06-17 PROCEDURE — G0145 SCR C/V CYTO,THINLAYER,RESCR: HCPCS | Performed by: NURSE PRACTITIONER

## 2019-06-17 PROCEDURE — 87624 HPV HI-RISK TYP POOLED RSLT: CPT | Performed by: NURSE PRACTITIONER

## 2019-06-17 PROCEDURE — 99396 PREV VISIT EST AGE 40-64: CPT | Performed by: NURSE PRACTITIONER

## 2019-06-20 LAB
HPV HR 12 DNA CVX QL NAA+PROBE: NEGATIVE
HPV16 DNA CVX QL NAA+PROBE: NEGATIVE
HPV18 DNA CVX QL NAA+PROBE: NEGATIVE

## 2019-06-24 LAB
LAB AP GYN PRIMARY INTERPRETATION: NORMAL
Lab: NORMAL

## 2019-07-03 ENCOUNTER — TELEPHONE (OUTPATIENT)
Dept: OBGYN CLINIC | Facility: CLINIC | Age: 49
End: 2019-07-03

## 2019-07-03 ENCOUNTER — APPOINTMENT (OUTPATIENT)
Dept: LAB | Facility: CLINIC | Age: 49
End: 2019-07-03
Payer: COMMERCIAL

## 2019-07-03 ENCOUNTER — HOSPITAL ENCOUNTER (OUTPATIENT)
Dept: ULTRASOUND IMAGING | Facility: HOSPITAL | Age: 49
Discharge: HOME | End: 2019-07-03
Payer: COMMERCIAL

## 2019-07-03 ENCOUNTER — HOSPITAL ENCOUNTER (OUTPATIENT)
Dept: MAMMOGRAPHY | Facility: CLINIC | Age: 49
Discharge: HOME | End: 2019-07-03
Payer: COMMERCIAL

## 2019-07-03 VITALS — HEIGHT: 64 IN | WEIGHT: 165 LBS | BODY MASS INDEX: 28.17 KG/M2

## 2019-07-03 DIAGNOSIS — Z12.31 ENCOUNTER FOR SCREENING MAMMOGRAM FOR MALIGNANT NEOPLASM OF BREAST: ICD-10-CM

## 2019-07-03 DIAGNOSIS — N95.0 POSTMENOPAUSAL BLEEDING: ICD-10-CM

## 2019-07-03 LAB — FSH SERPL-ACNC: 105 MIU/ML

## 2019-07-03 PROCEDURE — 77067 SCR MAMMO BI INCL CAD: CPT

## 2019-07-03 PROCEDURE — 76856 US EXAM PELVIC COMPLETE: CPT

## 2019-07-03 PROCEDURE — 77063 BREAST TOMOSYNTHESIS BI: CPT

## 2019-07-03 PROCEDURE — 36415 COLL VENOUS BLD VENIPUNCTURE: CPT | Performed by: NURSE PRACTITIONER

## 2019-07-03 PROCEDURE — 76830 TRANSVAGINAL US NON-OB: CPT

## 2019-07-03 PROCEDURE — 83001 ASSAY OF GONADOTROPIN (FSH): CPT | Performed by: NURSE PRACTITIONER

## 2019-07-03 NOTE — TELEPHONE ENCOUNTER
Patient returned my called- advised as directed  Pt scheduled with you for 07/22 at 8:05 am arr appt for 8:20 am  Pt advised to take ibuprofen prior to coming in to help with discomfort

## 2019-07-03 NOTE — TELEPHONE ENCOUNTER
----- Message from Pritesh Lennon, [address] P[address] sent at 7/3/2019  3:36 PM EDT -----  Please notify patient her endometrial lining was normal on ultrasound but with her hx of PMB and her [address] [place] [address] Street being very high (indicative of postmenopausal state) I would like her to come in for an office EMB  If she does not want to then she should call if she bleeds again so we can do it  Thanks

## 2019-08-13 ENCOUNTER — OFFICE VISIT (OUTPATIENT)
Dept: FAMILY MEDICINE CLINIC | Facility: CLINIC | Age: 49
End: 2019-08-13
Payer: COMMERCIAL

## 2019-08-13 ENCOUNTER — TELEPHONE (OUTPATIENT)
Dept: BEHAVIORAL/MENTAL HEALTH CLINIC | Facility: CLINIC | Age: 49
End: 2019-08-13

## 2019-08-13 VITALS
BODY MASS INDEX: 27.72 KG/M2 | HEART RATE: 88 BPM | OXYGEN SATURATION: 97 % | DIASTOLIC BLOOD PRESSURE: 72 MMHG | SYSTOLIC BLOOD PRESSURE: 110 MMHG | WEIGHT: 162.4 LBS | HEIGHT: 64 IN

## 2019-08-13 DIAGNOSIS — Z12.11 SCREENING FOR MALIGNANT NEOPLASM OF COLON: ICD-10-CM

## 2019-08-13 DIAGNOSIS — C18.9 MALIGNANT NEOPLASM OF COLON, UNSPECIFIED PART OF COLON (HCC): Primary | ICD-10-CM

## 2019-08-13 DIAGNOSIS — Z13.220 SCREENING, LIPID: ICD-10-CM

## 2019-08-13 DIAGNOSIS — F41.9 ANXIETY: ICD-10-CM

## 2019-08-13 DIAGNOSIS — R45.4 DIFFICULTY CONTROLLING ANGER: ICD-10-CM

## 2019-08-13 PROCEDURE — 99204 OFFICE O/P NEW MOD 45 MIN: CPT | Performed by: FAMILY MEDICINE

## 2019-08-13 PROCEDURE — 3008F BODY MASS INDEX DOCD: CPT | Performed by: FAMILY MEDICINE

## 2019-08-13 RX ORDER — FLUOXETINE HYDROCHLORIDE 20 MG/1
20 CAPSULE ORAL DAILY
Qty: 30 CAPSULE | Refills: 0 | Status: SHIPPED | OUTPATIENT
Start: 2019-08-13 | End: 2020-01-06

## 2019-08-13 NOTE — PATIENT INSTRUCTIONS
Discussed all with patient   Mood questionnaire given here is negative for bipolar   Will start fluoxetine 20 mg daily in the morning to see if this helps with the anger in the anxiety as I believe it will   Make your appointment with Nae Ponce   Also schedule your appointment with Dr Stephenie Smith to follow up on the colon cancer out here with a gastroenterologist    Have the blood work done and the urine testing done fasting which means no food for 12 hours but drink water before the test   I will call you with there is abnormals if not we will discuss at follow-up in 4 weeks  If you have any problems at all call here I will get the message  Weight Management   AMBULATORY CARE:   Why it is important to manage your weight:  Being overweight increases your risk of health conditions such as heart disease, high blood pressure, type 2 diabetes, and certain types of cancer  It can also increase your risk for osteoarthritis, sleep apnea, and other respiratory problems  Aim for a slow, steady weight loss  Even a small amount of weight loss can lower your risk of health problems  How to lose weight safely:  A safe and healthy way to lose weight is to eat fewer calories and get regular exercise  You can lose up about 1 pound a week by decreasing the number of calories you eat by 500 calories each day  You can decrease calories by eating smaller portion sizes or by cutting out high-calorie foods  Read labels to find out how many calories are in the foods you eat  You can also burn calories with exercise such as walking, swimming, or biking  You will be more likely to keep weight off if you make these changes part of your lifestyle  Healthy meal plan for weight management:  A healthy meal plan includes a variety of foods, contains fewer calories, and helps you stay healthy  A healthy meal plan includes the following:  · Eat whole-grain foods more often  A healthy meal plan should contain fiber   Fiber is the part of grains, fruits, and vegetables that is not broken down by your body  Whole-grain foods are healthy and provide extra fiber in your diet  Some examples of whole-grain foods are whole-wheat breads and pastas, oatmeal, brown rice, and bulgur  · Eat a variety of vegetables every day  Include dark, leafy greens such as spinach, kale, farideh greens, and mustard greens  Eat yellow and orange vegetables such as carrots, sweet potatoes, and winter squash  · Eat a variety of fruits every day  Choose fresh or canned fruit (canned in its own juice or light syrup) instead of juice  Fruit juice has very little or no fiber  · Eat low-fat dairy foods  Drink fat-free (skim) milk or 1% milk  Eat fat-free yogurt and low-fat cottage cheese  Try low-fat cheeses such as mozzarella and other reduced-fat cheeses  · Choose meat and other protein foods that are low in fat  Choose beans or other legumes such as split peas or lentils  Choose fish, skinless poultry (chicken or turkey), or lean cuts of red meat (beef or pork)  Before you cook meat or poultry, cut off any visible fat  · Use less fat and oil  Try baking foods instead of frying them  Add less fat, such as margarine, sour cream, regular salad dressing and mayonnaise to foods  Eat fewer high-fat foods  Some examples of high-fat foods include french fries, doughnuts, ice cream, and cakes  · Eat fewer sweets  Limit foods and drinks that are high in sugar  This includes candy, cookies, regular soda, and sweetened drinks  Ways to decrease calories:   · Eat smaller portions  ¨ Use a small plate with smaller servings  ¨ Do not eat second helpings  ¨ When you eat at a restaurant, ask for a box and place half of your meal in the box before you eat  ¨ Share an entrée with someone else  · Replace high-calorie snacks with healthy, low-calorie snacks       ¨ Choose fresh fruit, vegetables, fat-free rice cakes, or air-popped popcorn instead of potato chips, nuts, or chocolate  ¨ Choose water or calorie-free drinks instead of soda or sweetened drinks  · Eat regular meals  Skipping meals can lead to overeating later in the day  Eat a healthy snack in place of a meal if you do not have time to eat a regular meal      · Do not shop for groceries when you are hungry  You may be more likely to make unhealthy food choices  Take a grocery list of healthy foods and shop after you have eaten  Exercise:  Exercise at least 30 minutes per day on most days of the week  Some examples of exercise include walking, biking, dancing, and swimming  You can also fit in more physical activity by taking the stairs instead of the elevator or parking farther away from stores  Ask your healthcare provider about the best exercise plan for you  Other things to consider as you try to lose weight:   · Be aware of situations that may give you the urge to overeat, such as eating while watching television  Find ways to avoid these situations  For example, read a book, go for a walk, or do crafts  · Meet with a weight loss support group or friends who are also trying to lose weight  This may help you stay motivated to continue working on your weight loss goals  © 2017 [address] [employer]  Information is for End User's use only and may not be sold, redistributed or otherwise used for commercial purposes  All illustrations and images included in CareNotes® are the copyrighted property of [employer] , [employer]  or Hugh Soni  The above information is an  only  It is not intended as medical advice for individual conditions or treatments  Talk to your doctor, nurse or pharmacist before following any medical regimen to see if it is safe and effective for you  Low Fat Diet   AMBULATORY CARE:   A low-fat diet  is an eating plan that is low in total fat, unhealthy fat, and cholesterol   You may need to follow a low-fat diet if you have trouble digesting or absorbing fat  You may also need to follow this diet if you have high cholesterol  You can also lower your cholesterol by increasing the amount of fiber in your diet  Soluble fiber is a type of fiber that helps to decrease cholesterol levels  Different types of fat in food:   · Limit unhealthy fats  A diet that is high in cholesterol, saturated fat, and trans fat may cause unhealthy cholesterol levels  Unhealthy cholesterol levels increase your risk of heart disease  ¨ Cholesterol:  Limit intake of cholesterol to less than 200 mg per day  Cholesterol is found in meat, eggs, and dairy  ¨ Saturated fat:  Limit saturated fat to less than 7% of your total daily calories  Ask your dietitian how many calories you need each day  Saturated fat is found in butter, cheese, ice cream, whole milk, and palm oil  Saturated fat is also found in meat, such as beef, pork, chicken skin, and processed meats  Processed meats include sausage, hot dogs, and bologna  ¨ Trans fat:  Avoid trans fat as much as possible  Trans fat is used in fried and baked foods  Foods that say trans fat free on the label may still have up to 0 5 grams of trans fat per serving  · Include healthy fats  Replace foods that are high in saturated and trans fat with foods high in healthy fats  This may help to decrease high cholesterol levels  ¨ Monounsaturated fats: These are found in avocados, nuts, and vegetable oils, such as olive, canola, and sunflower oil  ¨ Polyunsaturated fats: These can be found in vegetable oils, such as soybean or corn oil  Omega-3 fats can help to decrease the risk of heart disease  Omega-3 fats are found in fish, such as salmon, herring, trout, and tuna  Omega-3 fats can also be found in plant foods, such as walnuts, flaxseed, soybeans, and canola oil    Foods to limit or avoid:   · Grains:      ¨ Snacks that are made with partially hydrogenated oils, such as chips, regular crackers, and butter-flavored popcorn    ¨ High-fat baked goods, such as biscuits, croissants, doughnuts, pies, cookies, and pastries    · Dairy:      ¨ Whole milk, 2% milk, and yogurt and ice cream made with whole milk    ¨ Half and half creamer, heavy cream, and whipping cream    ¨ Cheese, cream cheese, and sour cream    · Meats and proteins:      ¨ High-fat cuts of meat (T-bone steak, regular hamburger, and ribs)    ¨ Fried meat, poultry (turkey and chicken), and fish    ¨ Poultry (chicken and turkey) with skin    ¨ Cold cuts (salami or bologna), hot dogs, graves, and sausage    ¨ Whole eggs and egg yolks    · Vegetables and fruits with added fat:      ¨ Fried vegetables or vegetables in butter or high-fat sauces, such as cream or cheese sauces    ¨ Fried fruit or fruit served with butter or cream    · Fats:      ¨ Butter, stick margarine, and shortening    ¨ Coconut, palm oil, and palm kernel oil  Foods to include:   · Grains:      ¨ Whole-grain breads, cereals, pasta, and brown rice    ¨ Low-fat crackers and pretzels    · Vegetables and fruits:      ¨ Fresh, frozen, or canned vegetables (no salt or low-sodium)    ¨ Fresh, frozen, dried, or canned fruit (canned in light syrup or fruit juice)    ¨ Avocado    · Low-fat dairy products:      ¨ Nonfat (skim) or 1% milk    ¨ Nonfat or low-fat cheese, yogurt, and cottage cheese    · Meats and proteins:      ¨ Chicken or turkey with no skin    ¨ Baked or broiled fish    ¨ Lean beef and pork (loin, round, extra lean hamburger)    ¨ Beans and peas, unsalted nuts, soy products    ¨ Egg whites and substitutes    ¨ Seeds and nuts    · Fats:      ¨ Unsaturated oil, such as canola, olive, peanut, soybean, or sunflower oil    ¨ Soft or liquid margarine and vegetable oil spread    ¨ Low-fat salad dressing  Other ways to decrease fat:   · Read food labels before you buy foods  Choose foods that have less than 30% of calories from fat  Choose low-fat or fat-free dairy products   Remember that fat free does not mean calorie free  These foods still contain calories, and too many calories can lead to weight gain  · Trim fat from meat and avoid fried food  Trim all visible fat from meat before you cook it  Remove the skin from poultry  Do not garcía meat, fish, or poultry  Bake, roast, boil, or broil these foods instead  Avoid fried foods  Eat a baked potato instead of [place] fries  Steam vegetables instead of sautéing them in butter  · Add less fat to foods  Use imitation graves bits on salads and baked potatoes instead of regular graves bits  Use fat-free or low-fat salad dressings instead of regular dressings  Use low-fat or nonfat butter-flavored topping instead of regular butter or margarine on popcorn and other foods  Ways to decrease fat in recipes:  Replace high-fat ingredients with low-fat or nonfat ones  This may cause baked goods to be drier than usual  You may need to use nonfat cooking spray on pans to prevent food from sticking  You also may need to change the amount of other ingredients, such as water, in the recipe  Try the following:  · Use low-fat or light margarine instead of regular margarine or shortening  · Use lean ground turkey breast or chicken, or lean ground beef (less than 5% fat) instead of hamburger  · Add 1 teaspoon of canola oil to 8 ounces of skim milk instead of using cream or half and half  · Use grated zucchini, carrots, or apples in breads instead of coconut  · Use blenderized, low-fat cottage cheese, plain tofu, or low-fat ricotta cheese instead of cream cheese  · Use 1 egg white and 1 teaspoon of canola oil, or use ¼ cup (2 ounces) of fat-free egg substitute instead of a whole egg  · Replace half of the oil that is called for in a recipe with applesauce when you bake  Use 3 tablespoons of cocoa powder and 1 tablespoon of canola oil instead of a square of baking chocolate    How to increase fiber:  Eat enough high-fiber foods to get 20 to 30 grams of fiber every day  Slowly increase your fiber intake to avoid stomach cramps, gas, and other problems  · Eat 3 ounces of whole-grain foods each day  An ounce is about 1 slice of bread  Eat whole-grain breads, such as whole-wheat bread  Whole wheat, whole-wheat flour, or other whole grains should be listed as the first ingredient on the food label  Replace white flour with whole-grain flour or use half of each in recipes  Whole-grain flour is heavier than white flour, so you may have to add more yeast or baking powder  · Eat a high-fiber cereal for breakfast   Oatmeal is a good source of soluble fiber  Look for cereals that have bran or fiber in the name  Choose whole-grain products, such as brown rice, barley, and whole-wheat pasta  · Eat more beans, peas, and lentils  For example, add beans to soups or salads  Eat at least 5 cups of fruits and vegetables each day  Eat fruits and vegetables with the peel because the peel is high in fiber  © 2017 [address] [employer] Information is for End User's use only and may not be sold, redistributed or otherwise used for commercial purposes  All illustrations and images included in CareNotes® are the copyrighted property of A D [employer] M [employer]  or Hugh Soni  The above information is an  only  It is not intended as medical advice for individual conditions or treatments  Talk to your doctor, nurse or pharmacist before following any medical regimen to see if it is safe and effective for you  Heart Healthy Diet   AMBULATORY CARE:   A heart healthy diet  is an eating plan low in total fat, unhealthy fats, and sodium (salt)  A heart healthy diet helps decrease your risk for heart disease and stroke  Limit the amount of fat you eat to 25% to 35% of your total daily calories  Limit sodium to less than 2,300 mg each day  Healthy fats:  Healthy fats can help improve cholesterol levels   The risk for heart disease is decreased when cholesterol levels are normal  Choose healthy fats, such as the following:  · Unsaturated fat  is found in foods such as soybean, canola, olive, corn, and safflower oils  It is also found in soft tub margarine that is made with liquid vegetable oil  · Omega-3 fat  is found in certain fish, such as salmon, tuna, and trout, and in walnuts and flaxseed  Unhealthy fats:  Unhealthy fats can cause unhealthy cholesterol levels in your blood and increase your risk of heart disease  Limit unhealthy fats, such as the following:  · Cholesterol  is found in animal foods, such as eggs and lobster, and in dairy products made from whole milk  Limit cholesterol to less than 300 milligrams (mg) each day  You may need to limit cholesterol to 200 mg each day if you have heart disease  · Saturated fat  is found in meats, such as graves and hamburger  It is also found in chicken or turkey skin, whole milk, and butter  Limit saturated fat to less than 7% of your total daily calories  Limit saturated fat to less than 6% if you have heart disease or are at increased risk for it  · Trans fat  is found in packaged foods, such as potato chips and cookies  It is also in hard margarine, some fried foods, and shortening  Avoid trans fats as much as possible    Heart healthy foods and drinks to include:  Ask your dietitian or healthcare provider how many servings to have from each of the following food groups:  · Grains:      ¨ Whole-wheat breads, cereals, and pastas, and brown rice    ¨ Low-fat, low-sodium crackers and chips    · Vegetables:      ¨ Broccoli, green beans, green peas, and spinach    ¨ Collards, kale, and lima beans    ¨ Carrots, sweet potatoes, tomatoes, and peppers    ¨ Canned vegetables with no salt added    · Fruits:      ¨ Bananas, peaches, pears, and pineapple    ¨ Grapes, raisins, and dates    ¨ Oranges, tangerines, grapefruit, orange juice, and grapefruit juice    ¨ Apricots, mangoes, melons, and papaya    ¨ Raspberries and strawberries    ¨ Canned fruit with no added sugar    · Low-fat dairy products:      ¨ Nonfat (skim) milk, 1% milk, and low-fat almond, cashew, or soy milks fortified with calcium    ¨ Low-fat cheese, regular or frozen yogurt, and cottage cheese    · Meats and proteins , such as lean cuts of beef and pork (loin, leg, round), skinless chicken and turkey, legumes, soy products, egg whites, and nuts  Foods and drinks to limit or avoid:  Ask your dietitian or healthcare provider about these and other foods that are high in unhealthy fat, sodium, and sugar:  · Snack or packaged foods , such as frozen dinners, cookies, macaroni and cheese, and cereals with more than 300 mg of sodium per serving    · Canned or dry mixes  for cakes, soups, sauces, or gravies    · Vegetables with added sodium , such as instant potatoes, vegetables with added sauces, or regular canned vegetables    · Other foods high in sodium , such as ketchup, barbecue sauce, salad dressing, pickles, olives, soy sauce, and miso    · High-fat dairy foods  such as whole or 2% milk, cream cheese, or sour cream, and cheeses     · High-fat protein foods  such as high-fat cuts of beef (T-bone steaks, ribs), chicken or turkey with skin, and organ meats, such as liver    · Cured or smoked meats , such as hot dogs, graves, and sausage    · Unhealthy fats and oils , such as butter, stick margarine, shortening, and cooking oils such as coconut or palm oil    · Food and drinks high in sugar , such as soft drinks (soda), sports drinks, sweetened tea, candy, cake, cookies, pies, and doughnuts  Other diet guidelines to follow:   · Eat more foods containing omega-3 fats  Eat fish high in omega-3 fats at least 2 times a week  · Limit alcohol  Too much alcohol can damage your heart and raise your blood pressure  Women should limit alcohol to 1 drink a day  Men should limit alcohol to 2 drinks a day   A drink of alcohol is 12 ounces of beer, 5 ounces of wine, or 1½ ounces of liquor  · Choose low-sodium foods  High-sodium foods can lead to high blood pressure  Add little or no salt to food you prepare  Use herbs and spices in place of salt  · Eat more fiber  to help lower cholesterol levels  Eat at least 5 servings of fruits and vegetables each day  Eat 3 ounces of whole-grain foods each day  Legumes (beans) are also a good source of fiber  Lifestyle guidelines:   · Do not smoke  Nicotine and other chemicals in cigarettes and cigars can cause lung and heart damage  Ask your healthcare provider for information if you currently smoke and need help to quit  E-cigarettes or smokeless tobacco still contain nicotine  Talk to your healthcare provider before you use these products  · Exercise regularly  to help you maintain a healthy weight and improve your blood pressure and cholesterol levels  Ask your healthcare provider about the best exercise plan for you  Do not start an exercise program without asking your healthcare provider  Follow up with your healthcare provider as directed:  Write down your questions so you remember to ask them during your visits  © 2017 [address] [facility] Information is for End User's use only and may not be sold, redistributed or otherwise used for commercial purposes  All illustrations and images included in CareNotes® are the copyrighted property of [employer] M [employer]  or Hugh Soni  The above information is an  only  It is not intended as medical advice for individual conditions or treatments  Talk to your doctor, nurse or pharmacist before following any medical regimen to see if it is safe and effective for you  Calorie Counting Diet   WHAT YOU NEED TO KNOW:   What is a calorie counting diet? It is a meal plan based on counting calories each day to reach a healthy body weight  You will need to eat fewer calories if you are trying to lose weight   Weight loss may decrease your risk for certain health problems or improve your health if you have health problems  Some of these health problems include heart disease, high blood pressure, and diabetes  What foods should I avoid? Your dietitian will tell you if you need to avoid certain foods based on your body weight and health condition  You may need to avoid high-fat foods if you are at risk for or have heart disease  You may need to eat fewer foods from the breads and starches food group if you have diabetes  How many calories are in foods? The following is a list of foods and drinks with the approximate number of calories in each  Check the food label to find the exact number of calories  A dietitian can tell you how many calories you should have from each food group each day    · Carbohydrate:      ¨ ½ of a 3-inch bagel, 1 slice of bread, or ½ of a hamburger bun or hot dog bun (80)    ¨ 1 (8-inch) flour tortilla or ½ cup of cooked rice (100)    ¨ 1 (6-inch) corn tortilla (80)    ¨ 1 (6-inch) pancake or 1 cup of bran flakes cereal (110)    ¨ ½ cup of cooked cereal (80)    ¨ ½ cup of cooked pasta (85)    ¨ 1 ounce of pretzels (100)    ¨ 3 cups of air-popped popcorn without butter or oil (80)    · Dairy:      ¨ 1 cup of skim or 1% milk (90)    ¨ 1 cup of 2% milk (120)    ¨ 1 cup of whole milk (160)    ¨ 1 cup of 2% chocolate milk (220)    ¨ 1 ounce of low-fat cheese with 3 grams of fat per ounce (70)    ¨ 1 ounce of cheddar cheese (114)    ¨ ½ cup of 1% fat cottage cheese (80)    ¨ 1 cup of plain or sugar-free, fat-free yogurt (90)    · Protein foods:      ¨ 3 ounces of fish (not breaded or fried) (95)    ¨ 3 ounces of breaded, fried fish (195)    ¨ ¾ cup of tuna canned in water (105)    ¨ 3 ounces of chicken breast without skin (105)    ¨ 1 fried chicken breast with skin (350)    ¨ ¼ cup of fat free egg substitute (40)    ¨ 1 large egg (75)    ¨ 3 ounces of lean beef or pork (165)    ¨ 3 ounces of fried pork chop or ham (185)    ¨ ½ cup of cooked dried beans, such as kidney, richards, lentils, or navy (115)    ¨ 3 ounces of bologna or lunch meat (225)    ¨ 2 links of breakfast sausage (140)    · Vegetables:      ¨ ½ cup of sliced mushrooms (10)    ¨ 1 cup of salad greens, such as lettuce, spinach, or reese (15)    ¨ ½ cup of steamed asparagus (20)    ¨ ½ cup of cooked summer squash, zucchini squash, or green or wax beans (25)    ¨ 1 cup of broccoli or cauliflower florets, or 1 medium tomato (25)    ¨ 1 large raw carrot or ½ cup of cooked carrots (40)    ¨ ? of a medium cucumber or 1 stalk of celery (5)    ¨ 1 small baked potato (160)    ¨ 1 cup of breaded, fried vegetables (230)    · Fruit:      ¨ 1 (6-inch) banana (55)     ¨ ½ of a 4-inch grapefruit (55)    ¨ 15 grapes (60)    ¨ 1 medium orange or apple (70)    ¨ 1 large peach (65)    ¨ 1 cup of fresh pineapple chunks (75)    ¨ 1 cup of melon cubes (50)    ¨ 1¼ cups of whole strawberries (45)    ¨ ½ cup of fruit canned in juice (55)    ¨ ½ cup of fruit canned in heavy syrup (110)    ¨ ?  cup of raisins (130)    ¨ ½ cup of unsweetened fruit juice (60)    ¨ ½ cup of grape, cranberry, or prune juice (90)    · Fat:      ¨ 10 peanuts or 2 teaspoons of peanut butter (55)    ¨ 2 tablespoons of avocado or 1 tablespoon of regular salad dressing (45)    ¨ 2 slices of graves (90)    ¨ 1 teaspoon of oil, such as safflower, canola, corn, or olive oil (45)    ¨ 2 teaspoons of low-fat margarine, or 1 tablespoon of low-fat mayonnaise (50)    ¨ 1 teaspoon of regular margarine (40)    ¨ 1 tablespoon of regular mayonnaise (135)    ¨ 1 tablespoon of cream cheese or 2 tablespoons of low-fat cream cheese (45)    ¨ 2 tablespoons of vegetable shortening (215)    · Dessert and sweets:      ¨ 8 animal crackers or 5 vanilla wafers (80)    ¨ 1 frozen fruit juice bar (80)    ¨ ½ cup of ice milk or low-fat frozen yogurt (90)    ¨ ½ cup of sherbet or sorbet (125)    ¨ ½ cup of sugar-free pudding or custard (60)    ¨ ½ cup of ice cream (140)    ¨ ½ cup of pudding or custard (175)    ¨ 1 (2-inch) square chocolate brownie (185)    · Combination foods:      ¨ Bean burrito made with an 8-inch tortilla, without cheese (275)    ¨ Chicken breast sandwich with lettuce and tomato (325)    ¨ 1 cup of chicken noodle soup (60)    ¨ 1 beef taco (175)    ¨ Regular hamburger with lettuce and tomato (310)    ¨ Regular cheeseburger with lettuce and tomato (410)     ¨ ¼ of a 12-inch cheese pizza (280)    ¨ Fried fish sandwich with lettuce and tomato (425)    ¨ Hot dog and bun (275)    ¨ 1½ cups of macaroni and cheese (310)    ¨ Taco salad with a fried tortilla shell (870)    · Low-calorie foods:      ¨ 1 tablespoon of ketchup or 1 tablespoon of fat free sour cream (15)    ¨ 1 teaspoon of mustard (5)    ¨ ¼ cup of salsa (20)    ¨ 1 large dill pickle (15)    ¨ 1 tablespoon of fat free salad dressing (10)    ¨ 2 teaspoons of low-sugar, light jam or jelly, or 1 tablespoon of sugar-free syrup (15)    ¨ 1 sugar-free popsicle (15)    ¨ 1 cup of club soda, seltzer water, or diet soda (0)  CARE AGREEMENT:   You have the right to help plan your care  Discuss treatment options with your caregivers to decide what care you want to receive  You always have the right to refuse treatment  The above information is an  only  It is not intended as medical advice for individual conditions or treatments  Talk to your doctor, nurse or pharmacist before following any medical regimen to see if it is safe and effective for you  © 2017 [address] Information is for End User's use only and may not be sold, redistributed or otherwise used for commercial purposes  All illustrations and images included in CareNotes® are the copyrighted property of [employer]  or Hugh Soni

## 2019-08-13 NOTE — PROGRESS NOTES
Assessment/Plan:     Chronic Problems:  No problem-specific Assessment & Plan notes found for this encounter  Visit Diagnosis:  Diagnoses and all orders for this visit:    Malignant neoplasm of colon, unspecified part of colon [facility])  -     Ambulatory referral to Gastroenterology; Future  -     CBC and differential; Future    Difficulty controlling anger  Comments:  Given mood questionnaire -> negative for bipolar  Will start fluoxetine 20 mg daily  Orders:  -     Ambulatory referral to [facility]; Future  -     FLUoxetine (PROzac) 20 mg capsule; Take 1 capsule (20 mg total) by mouth daily    Anxiety  Comments: Will start daily fluoxetine 20 mg and make the appt with Braden Mccormick  Orders:  -     Ambulatory referral to [facility]; Future  -     FLUoxetine (PROzac) 20 mg capsule; Take 1 capsule (20 mg total) by mouth daily    BMI 27 0-27 9,adult  Comments:  Given info to review on weight loss  Screening, lipid  -     Comprehensive metabolic panel; Future  -     Lipid panel; Future  -     Microalbumin / creatinine urine ratio  -     Urinalysis with microscopic    Screening for malignant neoplasm of colon  -     Occult Blood, Fecal Immunochemical; Future          Subjective:    Patient ID: Gricelda Gaxiola is a 57 y o  female  Pt is here to establish  Pt moved from [place] about 2 years ago and needs to establish here  Pt is a colon cancer survivor since age 38  Aunt  of colon cancer  Sister had breast cancer and was told she did not have the gene so Radhafroilan Linton was told by [facility] she did not need the genetic testing  When she was first dx she had surgical removal of the tumor and pt opted not to have her entire colon removed  Had full chemo and radiation after the surgery  Pt is still involved with [facility] and has her last mri and ct scan in September and then told she does not need to be followed  Pt feels well  Still has some moments    Pt gained weight and fell in a deep depression when she had the cancer  Pt came off her meds a while ago as she had no filter so she stopped the meds  Feels she has a problem with her anger  Feels she still suffers from anxiety and stress from the worry about her health  Not currently on any meds, but feels she can use something for her anxiety and anger  The following portions of the patient's history were reviewed and updated as appropriate: allergies, current medications, past family history, past medical history, past social history, past surgical history and problem list     Review of Systems   Constitutional: Negative for chills, diaphoresis, fatigue and fever  Eyes: Negative  Respiratory: Negative for cough, shortness of breath and wheezing  Pt is a former smoker  Cardiovascular: Negative for chest pain and palpitations  Gastrointestinal: Negative for abdominal pain, constipation, diarrhea, nausea and vomiting  Never had a colostomy  Needs gi here   Genitourinary: Negative for difficulty urinating, dysuria, frequency and urgency  Last pap - 1 month ago and reported as wnl  Menopause at age 45   Musculoskeletal: Positive for arthralgias (problems with left knee s/p 2 surgeries  Told she has a vascular issue in the left leg  Leg always swells and she wears compression socks which she does not wear in the summer  )  Neurological: Negative for dizziness, light-headedness and headaches  Psychiatric/Behavioral: Positive for sleep disturbance (poor sleeper  Has high energy job and she is a workaholic  )  Negative for dysphoric mood  The patient is nervous/anxious  Knows she has anger issues            /72   Pulse 88   Ht 5' 4" (1 626 m)   Wt 73 7 kg (162 lb 6 4 oz)   LMP 07/01/2014 (Within Days) Comment: Medical induced menopsuse   SpO2 97%   BMI 27 88 kg/m²   Social History     Socioeconomic History    Marital status: /Civil Union     Spouse name: Not on file    Number of children: Not on file    Years of education: Not on file    Highest education level: Not on file   Occupational History    Occupation: EMPLOYED   Social Needs    Financial resource strain: Not on file    Food insecurity:     Worry: Not on file     Inability: Not on file    Transportation needs:     Medical: Not on file     Non-medical: Not on file   Tobacco Use    Smoking status: Former Smoker     Last attempt to quit:      Years since quittin 6    Smokeless tobacco: Never Used   Substance and Sexual Activity    Alcohol use: Yes     Frequency: Monthly or less    Drug use: No    Sexual activity: Not Currently     Birth control/protection: Post-menopausal   Lifestyle    Physical activity:     Days per week: Not on file     Minutes per session: Not on file    Stress: Not on file   Relationships    Social connections:     Talks on phone: Not on file     Gets together: Not on file     Attends Protestant service: Not on file     Active member of club or organization: Not on file     Attends meetings of clubs or organizations: Not on file     Relationship status: Not on file    Intimate partner violence:     Fear of current or ex partner: Not on file     Emotionally abused: Not on file     Physically abused: Not on file     Forced sexual activity: Not on file   Other Topics Concern    Not on file   Social History Narrative    Not on file     Past Medical History:   Diagnosis Date    Cancer [facility])     Colon cancer ([facility] [address] ) 2014    History of chemotherapy 2014    colon ca    History of radiation therapy 2014    colon ca    History of tear of meniscus of knee joint     Hyperlipidemia     Tennis elbow      Family History   Problem Relation Age of Onset    Diabetes Mother     Heart failure Mother     Heart defect Father         CARDIAC PACEMAKER    Hip fracture Father         HIP REPLACEMENT    Breast cancer Sister 52    BRCA1 Negative Sister     BRCA2 Negative Sister     Lung cancer Maternal Grandmother     Breast cancer Maternal Aunt 46        again at 72    Colon cancer Maternal Aunt     No Known Problems Paternal Aunt     No Known Problems Paternal Aunt     No Known Problems Paternal Aunt     No Known Problems Paternal Aunt     Ovarian cancer Neg Hx     Uterine cancer Neg Hx     Cervical cancer Neg Hx     Endometrial cancer Neg Hx      Past Surgical History:   Procedure Laterality Date    BREAST SURGERY Bilateral     RECONSTRUCION WITH IMPLANT PROTHESIS     COLON SURGERY      ELBOW SURGERY Left 2015    Tennis elbow    KNEE SURGERY      MENISCECTOMY Left 06/2016       Current Outpatient Medications:     atorvastatin (LIPITOR) 20 mg tablet, Take 1 tablet (20 mg total) by mouth daily, Disp: 90 tablet, Rfl: 3    Elastic Bandages & Supports (MEDICAL COMPRESSION STOCKINGS) MISC, by Does not apply route daily Knee High 20-30mmHg (Patient not taking: Reported on 6/17/2019), Disp: 2 each, Rfl: 2    FLUoxetine (PROzac) 20 mg capsule, Take 1 capsule (20 mg total) by mouth daily, Disp: 30 capsule, Rfl: 0    Allergies   Allergen Reactions    Iodinated Diagnostic Agents           Lab Review   Annual Exam on 06/17/2019   Component Date Value    [facility] 07/03/2019 105 0     Case Report 06/17/2019                      Value:Gynecologic Cytology Report                       Case: [id number]                                  Authorizing Provider:  NITESH Mcdaniels       Collected:           06/17/2019 0830              Ordering Location:     [facility]     Received:            06/17/2019 30                                     [facility]                                                                               [place]                                                                       First Screen:          Suki Garcia                                                                  Rescreen:              RADHA Landers                                                         Specimen: LIQUID-BASED PAP, SCREENING, Cervix                                                        Primary Interpretation 06/17/2019 Negative for intraepithelial lesion or malignancy     Specimen Adequacy 06/17/2019 Satisfactory for evaluation  Endocervical/transformation zone component present   Additional Information 06/17/2019                      Value: This result contains rich text formatting which cannot be displayed here   HPV Other HR 06/17/2019 Negative     HPV16 06/17/2019 Negative     HPV18 06/17/2019 Negative         Imaging: No results found  Objective:     Physical Exam   Constitutional: She is oriented to person, place, and time  She appears well-developed and well-nourished  No distress  HENT:   Head: Normocephalic and atraumatic  Right Ear: External ear normal    Left Ear: External ear normal    Mouth/Throat: Oropharynx is clear and moist    Eyes: Pupils are equal, round, and reactive to light  Conjunctivae and EOM are normal  Right eye exhibits no discharge  Left eye exhibits no discharge  Neck: Normal range of motion  Neck supple  Cardiovascular: Normal rate, regular rhythm and normal heart sounds  Exam reveals no friction rub  No murmur heard  Pulmonary/Chest: Effort normal and breath sounds normal  No respiratory distress  She has no wheezes  She has no rales  She exhibits no tenderness  Abdominal: Soft  Bowel sounds are normal  There is no tenderness  No s/p or cva tenderness  Musculoskeletal: Normal range of motion  She exhibits edema (to the left knee with minimal tenderness  )  She exhibits no tenderness or deformity  Lymphadenopathy:     She has no cervical adenopathy  Neurological: She is alert and oriented to person, place, and time  No cranial nerve deficit  Skin: Skin is warm and dry  No rash noted  She is not diaphoretic  Psychiatric: She has a normal mood and affect   Her behavior is normal  Judgment and thought content normal          Patient Instructions   Discussed all with patient   Mood questionnaire given here is negative for bipolar   Will start fluoxetine 20 mg daily in the morning to see if this helps with the anger in the anxiety as I believe it will   Make your appointment with Terrie Jimenes   Also schedule your appointment with Dr Lady Mccormack to follow up on the colon cancer out here with a gastroenterologist    Have the blood work done and the urine testing done fasting which means no food for 12 hours but drink water before the test   I will call you with there is abnormals if not we will discuss at follow-up in 4 weeks  If you have any problems at all call here I will get the message  Weight Management   AMBULATORY CARE:   Why it is important to manage your weight:  Being overweight increases your risk of health conditions such as heart disease, high blood pressure, type 2 diabetes, and certain types of cancer  It can also increase your risk for osteoarthritis, sleep apnea, and other respiratory problems  Aim for a slow, steady weight loss  Even a small amount of weight loss can lower your risk of health problems  How to lose weight safely:  A safe and healthy way to lose weight is to eat fewer calories and get regular exercise  You can lose up about 1 pound a week by decreasing the number of calories you eat by 500 calories each day  You can decrease calories by eating smaller portion sizes or by cutting out high-calorie foods  Read labels to find out how many calories are in the foods you eat  You can also burn calories with exercise such as walking, swimming, or biking  You will be more likely to keep weight off if you make these changes part of your lifestyle  Healthy meal plan for weight management:  A healthy meal plan includes a variety of foods, contains fewer calories, and helps you stay healthy  A healthy meal plan includes the following:  · Eat whole-grain foods more often  A healthy meal plan should contain fiber  Fiber is the part of grains, fruits, and vegetables that is not broken down by your body  Whole-grain foods are healthy and provide extra fiber in your diet  Some examples of whole-grain foods are whole-wheat breads and pastas, oatmeal, brown rice, and bulgur  · Eat a variety of vegetables every day  Include dark, leafy greens such as spinach, kale, farideh greens, and mustard greens  Eat yellow and orange vegetables such as carrots, sweet potatoes, and winter squash  · Eat a variety of fruits every day  Choose fresh or canned fruit (canned in its own juice or light syrup) instead of juice  Fruit juice has very little or no fiber  · Eat low-fat dairy foods  Drink fat-free (skim) milk or 1% milk  Eat fat-free yogurt and low-fat cottage cheese  Try low-fat cheeses such as mozzarella and other reduced-fat cheeses  · Choose meat and other protein foods that are low in fat  Choose beans or other legumes such as split peas or lentils  Choose fish, skinless poultry (chicken or turkey), or lean cuts of red meat (beef or pork)  Before you cook meat or poultry, cut off any visible fat  · Use less fat and oil  Try baking foods instead of frying them  Add less fat, such as margarine, sour cream, regular salad dressing and mayonnaise to foods  Eat fewer high-fat foods  Some examples of high-fat foods include french fries, doughnuts, ice cream, and cakes  · Eat fewer sweets  Limit foods and drinks that are high in sugar  This includes candy, cookies, regular soda, and sweetened drinks  Ways to decrease calories:   · Eat smaller portions  ¨ Use a small plate with smaller servings  ¨ Do not eat second helpings  ¨ When you eat at a restaurant, ask for a box and place half of your meal in the box before you eat  ¨ Share an entrée with someone else  · Replace high-calorie snacks with healthy, low-calorie snacks       ¨ Choose fresh fruit, vegetables, fat-free rice cakes, or air-popped popcorn instead of potato chips, nuts, or chocolate  ¨ Choose water or calorie-free drinks instead of soda or sweetened drinks  · Eat regular meals  Skipping meals can lead to overeating later in the day  Eat a healthy snack in place of a meal if you do not have time to eat a regular meal      · Do not shop for groceries when you are hungry  You may be more likely to make unhealthy food choices  Take a grocery list of healthy foods and shop after you have eaten  Exercise:  Exercise at least 30 minutes per day on most days of the week  Some examples of exercise include walking, biking, dancing, and swimming  You can also fit in more physical activity by taking the stairs instead of the elevator or parking farther away from stores  Ask your healthcare provider about the best exercise plan for you  Other things to consider as you try to lose weight:   · Be aware of situations that may give you the urge to overeat, such as eating while watching television  Find ways to avoid these situations  For example, read a book, go for a walk, or do crafts  · Meet with a weight loss support group or friends who are also trying to lose weight  This may help you stay motivated to continue working on your weight loss goals  © 2017 [address] [employer] [employer] Information is for End User's use only and may not be sold, redistributed or otherwise used for commercial purposes  All illustrations and images included in CareNotes® are the copyrighted property of [employer]  or Hugh Soni  The above information is an  only  It is not intended as medical advice for individual conditions or treatments  Talk to your doctor, nurse or pharmacist before following any medical regimen to see if it is safe and effective for you  Low Fat Diet   AMBULATORY CARE:   A low-fat diet  is an eating plan that is low in total fat, unhealthy fat, and cholesterol   You may need to follow a low-fat diet if you have trouble digesting or absorbing fat  You may also need to follow this diet if you have high cholesterol  You can also lower your cholesterol by increasing the amount of fiber in your diet  Soluble fiber is a type of fiber that helps to decrease cholesterol levels  Different types of fat in food:   · Limit unhealthy fats  A diet that is high in cholesterol, saturated fat, and trans fat may cause unhealthy cholesterol levels  Unhealthy cholesterol levels increase your risk of heart disease  ¨ Cholesterol:  Limit intake of cholesterol to less than 200 mg per day  Cholesterol is found in meat, eggs, and dairy  ¨ Saturated fat:  Limit saturated fat to less than 7% of your total daily calories  Ask your dietitian how many calories you need each day  Saturated fat is found in butter, cheese, ice cream, whole milk, and palm oil  Saturated fat is also found in meat, such as beef, pork, chicken skin, and processed meats  Processed meats include sausage, hot dogs, and bologna  ¨ Trans fat:  Avoid trans fat as much as possible  Trans fat is used in fried and baked foods  Foods that say trans fat free on the label may still have up to 0 5 grams of trans fat per serving  · Include healthy fats  Replace foods that are high in saturated and trans fat with foods high in healthy fats  This may help to decrease high cholesterol levels  ¨ Monounsaturated fats: These are found in avocados, nuts, and vegetable oils, such as olive, canola, and sunflower oil  ¨ Polyunsaturated fats: These can be found in vegetable oils, such as soybean or corn oil  Omega-3 fats can help to decrease the risk of heart disease  Omega-3 fats are found in fish, such as salmon, herring, trout, and tuna  Omega-3 fats can also be found in plant foods, such as walnuts, flaxseed, soybeans, and canola oil    Foods to limit or avoid:   · Grains:      ¨ Snacks that are made with partially hydrogenated oils, such as chips, regular crackers, and butter-flavored popcorn    ¨ High-fat baked goods, such as biscuits, croissants, doughnuts, pies, cookies, and pastries    · Dairy:      ¨ Whole milk, 2% milk, and yogurt and ice cream made with whole milk    ¨ Half and half creamer, heavy cream, and whipping cream    ¨ Cheese, cream cheese, and sour cream    · Meats and proteins:      ¨ High-fat cuts of meat (T-bone steak, regular hamburger, and ribs)    ¨ Fried meat, poultry (turkey and chicken), and fish    ¨ Poultry (chicken and turkey) with skin    ¨ Cold cuts (salami or bologna), hot dogs, graves, and sausage    ¨ Whole eggs and egg yolks    · Vegetables and fruits with added fat:      ¨ Fried vegetables or vegetables in butter or high-fat sauces, such as cream or cheese sauces    ¨ Fried fruit or fruit served with butter or cream    · Fats:      ¨ Butter, stick margarine, and shortening    ¨ Coconut, palm oil, and palm kernel oil  Foods to include:   · Grains:      ¨ Whole-grain breads, cereals, pasta, and brown rice    ¨ Low-fat crackers and pretzels    · Vegetables and fruits:      ¨ Fresh, frozen, or canned vegetables (no salt or low-sodium)    ¨ Fresh, frozen, dried, or canned fruit (canned in light syrup or fruit juice)    ¨ Avocado    · Low-fat dairy products:      ¨ Nonfat (skim) or 1% milk    ¨ Nonfat or low-fat cheese, yogurt, and cottage cheese    · Meats and proteins:      ¨ Chicken or turkey with no skin    ¨ Baked or broiled fish    ¨ Lean beef and pork (loin, round, extra lean hamburger)    ¨ Beans and peas, unsalted nuts, soy products    ¨ Egg whites and substitutes    ¨ Seeds and nuts    · Fats:      ¨ Unsaturated oil, such as canola, olive, peanut, soybean, or sunflower oil    ¨ Soft or liquid margarine and vegetable oil spread    ¨ Low-fat salad dressing  Other ways to decrease fat:   · Read food labels before you buy foods  Choose foods that have less than 30% of calories from fat  Choose low-fat or fat-free dairy products   Remember that fat free does not mean calorie free  These foods still contain calories, and too many calories can lead to weight gain  · Trim fat from meat and avoid fried food  Trim all visible fat from meat before you cook it  Remove the skin from poultry  Do not garcía meat, fish, or poultry  Bake, roast, boil, or broil these foods instead  Avoid fried foods  Eat a baked potato instead of [place] fries  Steam vegetables instead of sautéing them in butter  · Add less fat to foods  Use imitation graves bits on salads and baked potatoes instead of regular graves bits  Use fat-free or low-fat salad dressings instead of regular dressings  Use low-fat or nonfat butter-flavored topping instead of regular butter or margarine on popcorn and other foods  Ways to decrease fat in recipes:  Replace high-fat ingredients with low-fat or nonfat ones  This may cause baked goods to be drier than usual  You may need to use nonfat cooking spray on pans to prevent food from sticking  You also may need to change the amount of other ingredients, such as water, in the recipe  Try the following:  · Use low-fat or light margarine instead of regular margarine or shortening  · Use lean ground turkey breast or chicken, or lean ground beef (less than 5% fat) instead of hamburger  · Add 1 teaspoon of canola oil to 8 ounces of skim milk instead of using cream or half and half  · Use grated zucchini, carrots, or apples in breads instead of coconut  · Use blenderized, low-fat cottage cheese, plain tofu, or low-fat ricotta cheese instead of cream cheese  · Use 1 egg white and 1 teaspoon of canola oil, or use ¼ cup (2 ounces) of fat-free egg substitute instead of a whole egg  · Replace half of the oil that is called for in a recipe with applesauce when you bake  Use 3 tablespoons of cocoa powder and 1 tablespoon of canola oil instead of a square of baking chocolate    How to increase fiber:  Eat enough high-fiber foods to get 20 to 30 grams of fiber every day  Slowly increase your fiber intake to avoid stomach cramps, gas, and other problems  · Eat 3 ounces of whole-grain foods each day  An ounce is about 1 slice of bread  Eat whole-grain breads, such as whole-wheat bread  Whole wheat, whole-wheat flour, or other whole grains should be listed as the first ingredient on the food label  Replace white flour with whole-grain flour or use half of each in recipes  Whole-grain flour is heavier than white flour, so you may have to add more yeast or baking powder  · Eat a high-fiber cereal for breakfast   Oatmeal is a good source of soluble fiber  Look for cereals that have bran or fiber in the name  Choose whole-grain products, such as brown rice, barley, and whole-wheat pasta  · Eat more beans, peas, and lentils  For example, add beans to soups or salads  Eat at least 5 cups of fruits and vegetables each day  Eat fruits and vegetables with the peel because the peel is high in fiber  © 2017 [address] [employer] Information is for End User's use only and may not be sold, redistributed or otherwise used for commercial purposes  All illustrations and images included in CareNotes® are the copyrighted property of [employer]  or Hugh Soni  The above information is an  only  It is not intended as medical advice for individual conditions or treatments  Talk to your doctor, nurse or pharmacist before following any medical regimen to see if it is safe and effective for you  Heart Healthy Diet   AMBULATORY CARE:   A heart healthy diet  is an eating plan low in total fat, unhealthy fats, and sodium (salt)  A heart healthy diet helps decrease your risk for heart disease and stroke  Limit the amount of fat you eat to 25% to 35% of your total daily calories  Limit sodium to less than 2,300 mg each day  Healthy fats:  Healthy fats can help improve cholesterol levels   The risk for heart disease is decreased when cholesterol levels are normal  Choose healthy fats, such as the following:  · Unsaturated fat  is found in foods such as soybean, canola, olive, corn, and safflower oils  It is also found in soft tub margarine that is made with liquid vegetable oil  · Omega-3 fat  is found in certain fish, such as salmon, tuna, and trout, and in walnuts and flaxseed  Unhealthy fats:  Unhealthy fats can cause unhealthy cholesterol levels in your blood and increase your risk of heart disease  Limit unhealthy fats, such as the following:  · Cholesterol  is found in animal foods, such as eggs and lobster, and in dairy products made from whole milk  Limit cholesterol to less than 300 milligrams (mg) each day  You may need to limit cholesterol to 200 mg each day if you have heart disease  · Saturated fat  is found in meats, such as graves and hamburger  It is also found in chicken or turkey skin, whole milk, and butter  Limit saturated fat to less than 7% of your total daily calories  Limit saturated fat to less than 6% if you have heart disease or are at increased risk for it  · Trans fat  is found in packaged foods, such as potato chips and cookies  It is also in hard margarine, some fried foods, and shortening  Avoid trans fats as much as possible    Heart healthy foods and drinks to include:  Ask your dietitian or healthcare provider how many servings to have from each of the following food groups:  · Grains:      ¨ Whole-wheat breads, cereals, and pastas, and brown rice    ¨ Low-fat, low-sodium crackers and chips    · Vegetables:      ¨ Broccoli, green beans, green peas, and spinach    ¨ Collards, kale, and lima beans    ¨ Carrots, sweet potatoes, tomatoes, and peppers    ¨ Canned vegetables with no salt added    · Fruits:      ¨ Bananas, peaches, pears, and pineapple    ¨ Grapes, raisins, and dates    ¨ Oranges, tangerines, grapefruit, orange juice, and grapefruit juice    ¨ Apricots, mangoes, melons, and papaya    ¨ Raspberries and strawberries    ¨ Canned fruit with no added sugar    · Low-fat dairy products:      ¨ Nonfat (skim) milk, 1% milk, and low-fat almond, cashew, or soy milks fortified with calcium    ¨ Low-fat cheese, regular or frozen yogurt, and cottage cheese    · Meats and proteins , such as lean cuts of beef and pork (loin, leg, round), skinless chicken and turkey, legumes, soy products, egg whites, and nuts  Foods and drinks to limit or avoid:  Ask your dietitian or healthcare provider about these and other foods that are high in unhealthy fat, sodium, and sugar:  · Snack or packaged foods , such as frozen dinners, cookies, macaroni and cheese, and cereals with more than 300 mg of sodium per serving    · Canned or dry mixes  for cakes, soups, sauces, or gravies    · Vegetables with added sodium , such as instant potatoes, vegetables with added sauces, or regular canned vegetables    · Other foods high in sodium , such as ketchup, barbecue sauce, salad dressing, pickles, olives, soy sauce, and miso    · High-fat dairy foods  such as whole or 2% milk, cream cheese, or sour cream, and cheeses     · High-fat protein foods  such as high-fat cuts of beef (T-bone steaks, ribs), chicken or turkey with skin, and organ meats, such as liver    · Cured or smoked meats , such as hot dogs, graves, and sausage    · Unhealthy fats and oils , such as butter, stick margarine, shortening, and cooking oils such as coconut or palm oil    · Food and drinks high in sugar , such as soft drinks (soda), sports drinks, sweetened tea, candy, cake, cookies, pies, and doughnuts  Other diet guidelines to follow:   · Eat more foods containing omega-3 fats  Eat fish high in omega-3 fats at least 2 times a week  · Limit alcohol  Too much alcohol can damage your heart and raise your blood pressure  Women should limit alcohol to 1 drink a day  Men should limit alcohol to 2 drinks a day   A drink of alcohol is 12 ounces of beer, 5 ounces of wine, or 1½ ounces of liquor  · Choose low-sodium foods  High-sodium foods can lead to high blood pressure  Add little or no salt to food you prepare  Use herbs and spices in place of salt  · Eat more fiber  to help lower cholesterol levels  Eat at least 5 servings of fruits and vegetables each day  Eat 3 ounces of whole-grain foods each day  Legumes (beans) are also a good source of fiber  Lifestyle guidelines:   · Do not smoke  Nicotine and other chemicals in cigarettes and cigars can cause lung and heart damage  Ask your healthcare provider for information if you currently smoke and need help to quit  E-cigarettes or smokeless tobacco still contain nicotine  Talk to your healthcare provider before you use these products  · Exercise regularly  to help you maintain a healthy weight and improve your blood pressure and cholesterol levels  Ask your healthcare provider about the best exercise plan for you  Do not start an exercise program without asking your healthcare provider  Follow up with your healthcare provider as directed:  Write down your questions so you remember to ask them during your visits  © 2017 [facility] Information is for End User's use only and may not be sold, redistributed or otherwise used for commercial purposes  All illustrations and images included in CareNotes® are the copyrighted property of [employer] M [employer]  or Hugh Soni  The above information is an  only  It is not intended as medical advice for individual conditions or treatments  Talk to your doctor, nurse or pharmacist before following any medical regimen to see if it is safe and effective for you  Calorie Counting Diet   WHAT YOU NEED TO KNOW:   What is a calorie counting diet? It is a meal plan based on counting calories each day to reach a healthy body weight  You will need to eat fewer calories if you are trying to lose weight   Weight loss may decrease your risk for certain health problems or improve your health if you have health problems  Some of these health problems include heart disease, high blood pressure, and diabetes  What foods should I avoid? Your dietitian will tell you if you need to avoid certain foods based on your body weight and health condition  You may need to avoid high-fat foods if you are at risk for or have heart disease  You may need to eat fewer foods from the breads and starches food group if you have diabetes  How many calories are in foods? The following is a list of foods and drinks with the approximate number of calories in each  Check the food label to find the exact number of calories  A dietitian can tell you how many calories you should have from each food group each day    · Carbohydrate:      ¨ ½ of a 3-inch bagel, 1 slice of bread, or ½ of a hamburger bun or hot dog bun (80)    ¨ 1 (8-inch) flour tortilla or ½ cup of cooked rice (100)    ¨ 1 (6-inch) corn tortilla (80)    ¨ 1 (6-inch) pancake or 1 cup of bran flakes cereal (110)    ¨ ½ cup of cooked cereal (80)    ¨ ½ cup of cooked pasta (85)    ¨ 1 ounce of pretzels (100)    ¨ 3 cups of air-popped popcorn without butter or oil (80)    · Dairy:      ¨ 1 cup of skim or 1% milk (90)    ¨ 1 cup of 2% milk (120)    ¨ 1 cup of whole milk (160)    ¨ 1 cup of 2% chocolate milk (220)    ¨ 1 ounce of low-fat cheese with 3 grams of fat per ounce (70)    ¨ 1 ounce of cheddar cheese (114)    ¨ ½ cup of 1% fat cottage cheese (80)    ¨ 1 cup of plain or sugar-free, fat-free yogurt (90)    · Protein foods:      ¨ 3 ounces of fish (not breaded or fried) (95)    ¨ 3 ounces of breaded, fried fish (195)    ¨ ¾ cup of tuna canned in water (105)    ¨ 3 ounces of chicken breast without skin (105)    ¨ 1 fried chicken breast with skin (350)    ¨ ¼ cup of fat free egg substitute (40)    ¨ 1 large egg (75)    ¨ 3 ounces of lean beef or pork (165)    ¨ 3 ounces of fried pork chop or ham (185)    ¨ ½ cup of cooked dried beans, such as kidney, richards, lentils, or navy (115)    ¨ 3 ounces of bologna or lunch meat (225)    ¨ 2 links of breakfast sausage (140)    · Vegetables:      ¨ ½ cup of sliced mushrooms (10)    ¨ 1 cup of salad greens, such as lettuce, spinach, or reese (15)    ¨ ½ cup of steamed asparagus (20)    ¨ ½ cup of cooked summer squash, zucchini squash, or green or wax beans (25)    ¨ 1 cup of broccoli or cauliflower florets, or 1 medium tomato (25)    ¨ 1 large raw carrot or ½ cup of cooked carrots (40)    ¨ ? of a medium cucumber or 1 stalk of celery (5)    ¨ 1 small baked potato (160)    ¨ 1 cup of breaded, fried vegetables (230)    · Fruit:      ¨ 1 (6-inch) banana (55)     ¨ ½ of a 4-inch grapefruit (55)    ¨ 15 grapes (60)    ¨ 1 medium orange or apple (70)    ¨ 1 large peach (65)    ¨ 1 cup of fresh pineapple chunks (75)    ¨ 1 cup of melon cubes (50)    ¨ 1¼ cups of whole strawberries (45)    ¨ ½ cup of fruit canned in juice (55)    ¨ ½ cup of fruit canned in heavy syrup (110)    ¨ ?  cup of raisins (130)    ¨ ½ cup of unsweetened fruit juice (60)    ¨ ½ cup of grape, cranberry, or prune juice (90)    · Fat:      ¨ 10 peanuts or 2 teaspoons of peanut butter (55)    ¨ 2 tablespoons of avocado or 1 tablespoon of regular salad dressing (45)    ¨ 2 slices of graves (90)    ¨ 1 teaspoon of oil, such as safflower, canola, corn, or olive oil (45)    ¨ 2 teaspoons of low-fat margarine, or 1 tablespoon of low-fat mayonnaise (50)    ¨ 1 teaspoon of regular margarine (40)    ¨ 1 tablespoon of regular mayonnaise (135)    ¨ 1 tablespoon of cream cheese or 2 tablespoons of low-fat cream cheese (45)    ¨ 2 tablespoons of vegetable shortening (215)    · Dessert and sweets:      ¨ 8 animal crackers or 5 vanilla wafers (80)    ¨ 1 frozen fruit juice bar (80)    ¨ ½ cup of ice milk or low-fat frozen yogurt (90)    ¨ ½ cup of sherbet or sorbet (125)    ¨ ½ cup of sugar-free pudding or custard (60)    ¨ ½ cup of ice cream (140)    ¨ ½ cup of pudding or custard (175)    ¨ 1 (2-inch) square chocolate brownie (185)    · Combination foods:      ¨ Bean burrito made with an 8-inch tortilla, without cheese (275)    ¨ Chicken breast sandwich with lettuce and tomato (325)    ¨ 1 cup of chicken noodle soup (60)    ¨ 1 beef taco (175)    ¨ Regular hamburger with lettuce and tomato (310)    ¨ Regular cheeseburger with lettuce and tomato (410)     ¨ ¼ of a 12-inch cheese pizza (280)    ¨ Fried fish sandwich with lettuce and tomato (425)    ¨ Hot dog and bun (275)    ¨ 1½ cups of macaroni and cheese (310)    ¨ Taco salad with a fried tortilla shell (870)    · Low-calorie foods:      ¨ 1 tablespoon of ketchup or 1 tablespoon of fat free sour cream (15)    ¨ 1 teaspoon of mustard (5)    ¨ ¼ cup of salsa (20)    ¨ 1 large dill pickle (15)    ¨ 1 tablespoon of fat free salad dressing (10)    ¨ 2 teaspoons of low-sugar, light jam or jelly, or 1 tablespoon of sugar-free syrup (15)    ¨ 1 sugar-free popsicle (15)    ¨ 1 cup of club soda, seltzer water, or diet soda (0)  CARE AGREEMENT:   You have the right to help plan your care  Discuss treatment options with your caregivers to decide what care you want to receive  You always have the right to refuse treatment  The above information is an  only  It is not intended as medical advice for individual conditions or treatments  Talk to your doctor, nurse or pharmacist before following any medical regimen to see if it is safe and effective for you  © 2017 [address] Information is for End User's use only and may not be sold, redistributed or otherwise used for commercial purposes  All illustrations and images included in CareNotes® are the copyrighted property of A D [employer] M [employer] Inc  or NITESH Lopez    Portions of the record may have been created with voice recognition software   Occasional wrong word or "sound a like" substitutions may have occurred due to the inherent limitations of voice recognition software   Read the chart carefully and recognize, using context, where substitutions have occurred  BMI Counseling: Body mass index is 27 88 kg/m²  Discussed the patient's BMI with her  The BMI is above average  BMI counseling and education was provided to the patient  Nutrition recommendations include reducing portion sizes, decreasing overall calorie intake, 3-5 servings of fruits/vegetables daily, reducing fast food intake, consuming healthier snacks, decreasing soda and/or juice intake, moderation in carbohydrate intake, increasing intake of lean protein, reducing intake of saturated fat and trans fat and reducing intake of cholesterol  Exercise recommendations include moderate aerobic physical activity for 150 minutes/week

## 2019-09-16 ENCOUNTER — APPOINTMENT (OUTPATIENT)
Dept: LAB | Facility: CLINIC | Age: 49
End: 2019-09-16
Payer: COMMERCIAL

## 2019-09-16 DIAGNOSIS — Z12.11 SCREENING FOR MALIGNANT NEOPLASM OF COLON: ICD-10-CM

## 2019-09-16 DIAGNOSIS — Z13.220 SCREENING, LIPID: ICD-10-CM

## 2019-09-16 DIAGNOSIS — C18.9 MALIGNANT NEOPLASM OF COLON, UNSPECIFIED PART OF COLON (HCC): ICD-10-CM

## 2019-09-16 LAB
ALBUMIN SERPL BCP-MCNC: 4.3 G/DL (ref 3.5–5)
ALP SERPL-CCNC: 75 U/L (ref 46–116)
ALT SERPL W P-5'-P-CCNC: 33 U/L (ref 12–78)
ANION GAP SERPL CALCULATED.3IONS-SCNC: 5 MMOL/L (ref 4–13)
AST SERPL W P-5'-P-CCNC: 45 U/L (ref 5–45)
BACTERIA UR QL AUTO: ABNORMAL /HPF
BASOPHILS # BLD AUTO: 0.05 THOUSANDS/ΜL (ref 0–0.1)
BASOPHILS NFR BLD AUTO: 1 % (ref 0–1)
BILIRUB SERPL-MCNC: 0.31 MG/DL (ref 0.2–1)
BILIRUB UR QL STRIP: NEGATIVE
BUN SERPL-MCNC: 15 MG/DL (ref 5–25)
CALCIUM SERPL-MCNC: 9.4 MG/DL (ref 8.3–10.1)
CHLORIDE SERPL-SCNC: 108 MMOL/L (ref 100–108)
CHOLEST SERPL-MCNC: 187 MG/DL (ref 50–200)
CLARITY UR: CLEAR
CO2 SERPL-SCNC: 28 MMOL/L (ref 21–32)
COLOR UR: YELLOW
CREAT SERPL-MCNC: 0.82 MG/DL (ref 0.6–1.3)
CREAT UR-MCNC: 35.7 MG/DL
EOSINOPHIL # BLD AUTO: 0.45 THOUSAND/ΜL (ref 0–0.61)
EOSINOPHIL NFR BLD AUTO: 6 % (ref 0–6)
ERYTHROCYTE [DISTWIDTH] IN BLOOD BY AUTOMATED COUNT: 13.1 % (ref 11.6–15.1)
GFR SERPL CREATININE-BSD FRML MDRD: 86 ML/MIN/1.73SQ M
GLUCOSE P FAST SERPL-MCNC: 88 MG/DL (ref 65–99)
GLUCOSE UR STRIP-MCNC: NEGATIVE MG/DL
HCT VFR BLD AUTO: 43.5 % (ref 34.8–46.1)
HDLC SERPL-MCNC: 70 MG/DL (ref 40–60)
HEMOCCULT STL QL IA: NEGATIVE
HGB BLD-MCNC: 14.1 G/DL (ref 11.5–15.4)
HGB UR QL STRIP.AUTO: NEGATIVE
HYALINE CASTS #/AREA URNS LPF: ABNORMAL /LPF
IMM GRANULOCYTES # BLD AUTO: 0.01 THOUSAND/UL (ref 0–0.2)
IMM GRANULOCYTES NFR BLD AUTO: 0 % (ref 0–2)
KETONES UR STRIP-MCNC: NEGATIVE MG/DL
LDLC SERPL CALC-MCNC: 101 MG/DL (ref 0–100)
LEUKOCYTE ESTERASE UR QL STRIP: ABNORMAL
LYMPHOCYTES # BLD AUTO: 2.05 THOUSANDS/ΜL (ref 0.6–4.47)
LYMPHOCYTES NFR BLD AUTO: 26 % (ref 14–44)
MCH RBC QN AUTO: 30.7 PG (ref 26.8–34.3)
MCHC RBC AUTO-ENTMCNC: 32.4 G/DL (ref 31.4–37.4)
MCV RBC AUTO: 95 FL (ref 82–98)
MICROALBUMIN UR-MCNC: <5 MG/L (ref 0–20)
MICROALBUMIN/CREAT 24H UR: <14 MG/G CREATININE (ref 0–30)
MONOCYTES # BLD AUTO: 0.59 THOUSAND/ΜL (ref 0.17–1.22)
MONOCYTES NFR BLD AUTO: 8 % (ref 4–12)
NEUTROPHILS # BLD AUTO: 4.69 THOUSANDS/ΜL (ref 1.85–7.62)
NEUTS SEG NFR BLD AUTO: 59 % (ref 43–75)
NITRITE UR QL STRIP: NEGATIVE
NON-SQ EPI CELLS URNS QL MICRO: ABNORMAL /HPF
NONHDLC SERPL-MCNC: 117 MG/DL
NRBC BLD AUTO-RTO: 0 /100 WBCS
PH UR STRIP.AUTO: 6.5 [PH]
PLATELET # BLD AUTO: 340 THOUSANDS/UL (ref 149–390)
PMV BLD AUTO: 9.5 FL (ref 8.9–12.7)
POTASSIUM SERPL-SCNC: 4.1 MMOL/L (ref 3.5–5.3)
PROT SERPL-MCNC: 7.4 G/DL (ref 6.4–8.2)
PROT UR STRIP-MCNC: NEGATIVE MG/DL
RBC # BLD AUTO: 4.6 MILLION/UL (ref 3.81–5.12)
RBC #/AREA URNS AUTO: ABNORMAL /HPF
SODIUM SERPL-SCNC: 141 MMOL/L (ref 136–145)
SP GR UR STRIP.AUTO: 1.01 (ref 1–1.03)
TRIGL SERPL-MCNC: 82 MG/DL
UROBILINOGEN UR QL STRIP.AUTO: 0.2 E.U./DL
WBC # BLD AUTO: 7.84 THOUSAND/UL (ref 4.31–10.16)
WBC #/AREA URNS AUTO: ABNORMAL /HPF

## 2019-09-16 PROCEDURE — 82570 ASSAY OF URINE CREATININE: CPT | Performed by: FAMILY MEDICINE

## 2019-09-16 PROCEDURE — 36415 COLL VENOUS BLD VENIPUNCTURE: CPT

## 2019-09-16 PROCEDURE — G0328 FECAL BLOOD SCRN IMMUNOASSAY: HCPCS

## 2019-09-16 PROCEDURE — 85025 COMPLETE CBC W/AUTO DIFF WBC: CPT

## 2019-09-16 PROCEDURE — 80061 LIPID PANEL: CPT

## 2019-09-16 PROCEDURE — 81001 URINALYSIS AUTO W/SCOPE: CPT | Performed by: FAMILY MEDICINE

## 2019-09-16 PROCEDURE — 82043 UR ALBUMIN QUANTITATIVE: CPT | Performed by: FAMILY MEDICINE

## 2019-09-16 PROCEDURE — 80053 COMPREHEN METABOLIC PANEL: CPT

## 2019-09-17 ENCOUNTER — TELEPHONE (OUTPATIENT)
Dept: FAMILY MEDICINE CLINIC | Facility: CLINIC | Age: 49
End: 2019-09-17

## 2019-09-17 NOTE — TELEPHONE ENCOUNTER
----- Message from [address] [place] [address]  sent at 9/16/2019  7:32 PM EDT -----  Stool negative for ob

## 2019-09-18 ENCOUNTER — TELEPHONE (OUTPATIENT)
Dept: FAMILY MEDICINE CLINIC | Facility: CLINIC | Age: 49
End: 2019-09-18

## 2019-09-18 ENCOUNTER — PROCEDURE VISIT (OUTPATIENT)
Dept: OBGYN CLINIC | Facility: CLINIC | Age: 49
End: 2019-09-18
Payer: COMMERCIAL

## 2019-09-18 VITALS
HEIGHT: 64 IN | BODY MASS INDEX: 27.49 KG/M2 | WEIGHT: 161 LBS | DIASTOLIC BLOOD PRESSURE: 60 MMHG | SYSTOLIC BLOOD PRESSURE: 108 MMHG

## 2019-09-18 DIAGNOSIS — N95.0 POSTMENOPAUSAL BLEEDING: Primary | ICD-10-CM

## 2019-09-18 PROCEDURE — 88305 TISSUE EXAM BY PATHOLOGIST: CPT | Performed by: PATHOLOGY

## 2019-09-18 PROCEDURE — 58100 BIOPSY OF UTERUS LINING: CPT | Performed by: NURSE PRACTITIONER

## 2019-09-18 NOTE — PATIENT INSTRUCTIONS
Postmenopausal Bleeding   WHAT YOU NEED TO KNOW:   What do I need to know about postmenopausal bleeding? Postmenopausal bleeding is bleeding that occurs after menopause  A woman who has not had a period for a full year after the age of 36 is considered to be in menopause  Postmenopausal bleeding may range from spotting to very heavy bleeding  What causes postmenopausal bleeding? · Thinning of the endometrium (lining of the uterus) called endometrial atrophy    · Polyps (noncancerous growths) that develop on the inner wall of your uterus or cervix    · Hormone replacement therapy    · Abnormal thickening of the endometrium called endometrial hyperplasia    · Tamoxifen (medicine used to treat breast cancer)    · Cervical, endometrial, or uterine cancer  How is postmenopausal bleeding diagnosed? Your healthcare provider will ask about medical conditions that you have, and that run in your family  He will also do a pelvic exam to check for problems with your cervix, uterus, and ovaries  You may also need any of the following:  · An ultrasound  uses sound waves to show pictures of your uterus on a monitor  Your healthcare provider may place saline fluid into your uterus with a catheter  The fluid helps show more detail in the ultrasound pictures of your uterus  · Endometrial biopsy  is used to collect a sample of cells from the inside of your uterus to be tested for cancer  · Hysteroscopy  is a procedure that is done to look inside your uterus  A small scope with a light and camera is placed into your vagina and cervix  Liquid or gas may be put through the scope to help healthcare providers see better  · Dilation and curettage (D&C)  is a procedure to remove tissue from the lining of your uterus  The tissue is sent to a lab for tests  How is postmenopausal bleeding treated? Treatment depends on the cause of your postmenopausal bleeding  If you have polyps, you may need surgery to remove them  Endometrial atrophy can be treated with medicines  Endometrial hyperplasia may be treated with progestin hormone therapy  Surgery to remove your uterus will be needed if you have endometrial or uterine cancer  Your fallopian tubes, ovaries, and nearby lymph nodes may also be removed  When should I contact my healthcare provider? · You continue to have vaginal bleeding, even with treatment  · You have pain in your abdomen or pelvis  · You have questions or concerns about your condition or care  CARE AGREEMENT:   You have the right to help plan your care  Learn about your health condition and how it may be treated  Discuss treatment options with your caregivers to decide what care you want to receive  You always have the right to refuse treatment  The above information is an  only  It is not intended as medical advice for individual conditions or treatments  Talk to your doctor, nurse or pharmacist before following any medical regimen to see if it is safe and effective for you  © 2017 [address] Information is for End User's use only and may not be sold, redistributed or otherwise used for commercial purposes  All illustrations and images included in CareNotes® are the copyrighted property of [employer] M [employer]  or Hugh Soni

## 2019-09-18 NOTE — PROGRESS NOTES
Endometrial biopsy  Date/Time: 9/18/2019 9:09 AM  Performed by: NITESH Tellez  Authorized by: NITESH Tellez     Consent:     Consent obtained:  Verbal    Consent given by:  Patient    Procedural risks discussed:  Bleeding, infection, possible continued pain and repeat procedure    Patient questions answered: yes      Patient agrees, verbalizes understanding, and wants to proceed: yes      Educational handouts given: yes      Instructions and paperwork completed: yes    Indication:     Indications: Post-menopausal bleeding      Chronicity of post-menopausal bleeding:  New    Progression of post-menopausal bleeding:  Resolved  Pre-procedure:     Pre-procedure timeout performed: yes    Procedure:     Procedure: endometrial biopsy with Pipelle      A bivalve speculum was placed in the vagina: yes      Cervix cleaned and prepped: yes      A paracervical block was performed: no      An intracervical block was performed: no      The cervix was dilated: no      Uterus sounded: yes      Uterus sound depth (cm):  6    Curettes used:  1    Specimen collected: specimen collected and sent to pathology      Patient tolerated procedure well with no complications: yes    Findings:     Uterus size:  Non-gravid    Cervix: normal    Comments: Will call with results  Pelvic u/s showed endo lining 1mm in 7/2019

## 2019-09-18 NOTE — TELEPHONE ENCOUNTER
Called pt left v/m     ----- Message from Deandre Kincaid sent at 9/16/2019 12:28 PM EDT -----   Labs mariann    ----- Message -----  From: Lab, Background User  Sent: 9/16/2019  10:10 AM EDT  To: NITESH Case

## 2019-09-25 ENCOUNTER — TELEPHONE (OUTPATIENT)
Dept: OBGYN CLINIC | Facility: CLINIC | Age: 49
End: 2019-09-25

## 2019-09-25 NOTE — TELEPHONE ENCOUNTER
Patient is aware of her results and states she will observe for now  If she starts to bleed again will contact us

## 2019-09-25 NOTE — TELEPHONE ENCOUNTER
----- Message from Mariah Kayser, [address] sent at 9/25/2019  8:57 AM EDT -----  Pls advise pt her emb seems benign but unfortunately came back almost entirely mucous so not enough cells from the uterine lining were seen  If she bleeds again, we should repeat the EMB or if she does not want to repeat it she can see a doc for consult for D+C  Thanks

## 2019-10-21 DIAGNOSIS — E78.5 DYSLIPIDEMIA: ICD-10-CM

## 2019-10-21 RX ORDER — ATORVASTATIN CALCIUM 20 MG/1
20 TABLET, FILM COATED ORAL DAILY
Qty: 90 TABLET | Refills: 3 | Status: SHIPPED | OUTPATIENT
Start: 2019-10-21 | End: 2020-07-08 | Stop reason: SDUPTHER

## 2020-01-06 ENCOUNTER — OFFICE VISIT (OUTPATIENT)
Dept: FAMILY MEDICINE CLINIC | Facility: CLINIC | Age: 50
End: 2020-01-06
Payer: COMMERCIAL

## 2020-01-06 VITALS
HEIGHT: 64 IN | BODY MASS INDEX: 28.58 KG/M2 | HEART RATE: 96 BPM | DIASTOLIC BLOOD PRESSURE: 74 MMHG | TEMPERATURE: 98.1 F | WEIGHT: 167.4 LBS | OXYGEN SATURATION: 96 % | SYSTOLIC BLOOD PRESSURE: 122 MMHG

## 2020-01-06 DIAGNOSIS — J02.9 PHARYNGITIS, UNSPECIFIED ETIOLOGY: Primary | ICD-10-CM

## 2020-01-06 LAB — S PYO AG THROAT QL: NEGATIVE

## 2020-01-06 PROCEDURE — 87880 STREP A ASSAY W/OPTIC: CPT | Performed by: NURSE PRACTITIONER

## 2020-01-06 PROCEDURE — 99213 OFFICE O/P EST LOW 20 MIN: CPT | Performed by: NURSE PRACTITIONER

## 2020-01-06 PROCEDURE — 3008F BODY MASS INDEX DOCD: CPT | Performed by: NURSE PRACTITIONER

## 2020-01-06 RX ORDER — AMOXICILLIN 500 MG/1
500 TABLET, FILM COATED ORAL 2 TIMES DAILY
Qty: 20 TABLET | Refills: 0 | Status: SHIPPED | OUTPATIENT
Start: 2020-01-06 | End: 2020-01-16

## 2020-01-06 RX ORDER — BROMPHENIRAMINE MALEATE, PSEUDOEPHEDRINE HYDROCHLORIDE, AND DEXTROMETHORPHAN HYDROBROMIDE 2; 30; 10 MG/5ML; MG/5ML; MG/5ML
5 SYRUP ORAL 4 TIMES DAILY PRN
Qty: 118 ML | Refills: 0 | Status: SHIPPED | OUTPATIENT
Start: 2020-01-06 | End: 2020-06-22

## 2020-01-06 NOTE — PROGRESS NOTES
Assessment/Plan:    Pharyngitis  Rapid strep negative, will treat patient based on clinical presentation  To begin amoxicillin 500 mg every 12 hours for 10 days and Bromfed DM as needed for cough  Encouraged patient to begin probiotic while on antibiotic  Counseled on increasing fluid intake, beginning soft diet, and salt water gargles  Follow-up in 1 week if symptoms have not improved or sooner if symptoms worsen  Diagnoses and all orders for this visit:    Pharyngitis, unspecified etiology  -     amoxicillin (AMOXIL) 500 MG tablet; Take 1 tablet (500 mg total) by mouth 2 (two) times a day for 10 days  -     brompheniramine-pseudoephedrine-DM 30-2-10 MG/5ML syrup; Take 5 mL by mouth 4 (four) times a day as needed for congestion or cough  -     POCT rapid strepA          Subjective:      Patient ID: Naeem Estrada is a 54 y o  female  Elmira Vazquez presents reporting a sore throat x 3-4 days  She is also having a cough, ear pain, and nasal congestion  She has been treating her symptoms with OTC [facility] with some relief  Denies fever or difficulty swallowing         The following portions of the patient's history were reviewed and updated as appropriate:   She   Patient Active Problem List    Diagnosis Date Noted    Pharyngitis 01/06/2020    Postmenopausal bleeding 09/18/2019    Difficulty controlling anger 08/13/2019    Anxiety 08/13/2019    Varicose veins of left lower extremity with pain 12/12/2018    Encounter for gynecological examination 03/19/2018    Tear of meniscus of left knee 01/22/2018    Malignant neoplasm of colon ([facility] [address] ) 05/02/2017    Early menopause occurring in patient age younger than 39 years 05/02/2017    Hyperlipidemia 05/02/2017    Left hip pain 05/02/2017    Trochanteric bursitis of left hip 05/02/2017     Current Outpatient Medications   Medication Sig Dispense Refill    atorvastatin (LIPITOR) 20 mg tablet Take 1 tablet (20 mg total) by mouth daily 90 tablet 3    amoxicillin (AMOXIL) 500 MG tablet Take 1 tablet (500 mg total) by mouth 2 (two) times a day for 10 days 20 tablet 0    brompheniramine-pseudoephedrine-DM 30-2-10 MG/5ML syrup Take 5 mL by mouth 4 (four) times a day as needed for congestion or cough 118 mL 0     No current facility-administered medications for this visit  She is allergic to iodinated diagnostic agents       Review of Systems   Constitutional: Positive for chills and fatigue  HENT: Positive for congestion, rhinorrhea, sinus pressure and sore throat  Respiratory: Positive for cough  Cardiovascular: Negative  Gastrointestinal: Negative  Neurological: Positive for headaches  Psychiatric/Behavioral: Negative  /74   Pulse 96   Temp 98 1 °F (36 7 °C) (Tympanic)   Ht 5' 4" (1 626 m)   Wt 75 9 kg (167 lb 6 4 oz)   LMP 07/01/2014 (Within Days) Comment: Medical induced menopsuse   SpO2 96%   BMI 28 73 kg/m²     Objective:     Physical Exam   Constitutional: She is oriented to person, place, and time  She appears well-developed and well-nourished  HENT:   Head: Normocephalic and atraumatic  Right Ear: Tympanic membrane normal    Left Ear: Tympanic membrane normal    Nose: Mucosal edema and rhinorrhea present  Mouth/Throat: Mucous membranes are normal  Posterior oropharyngeal erythema present  Tonsillar exudate  Eyes: Conjunctivae are normal    Neck: Neck supple  Cardiovascular: Normal rate, regular rhythm and normal heart sounds  No murmur heard  Pulmonary/Chest: Effort normal and breath sounds normal  No respiratory distress  She has no wheezes  She has no rales  She exhibits no tenderness  Lymphadenopathy:     She has cervical adenopathy  Neurological: She is alert and oriented to person, place, and time  Psychiatric: She has a normal mood and affect  Her behavior is normal  Judgment and thought content normal    Nursing note and vitals reviewed        Results for orders placed or performed in visit on 01/06/20   POCT rapid strepA   Result Value Ref Range     RAPID STREP A Negative Negative

## 2020-01-06 NOTE — ASSESSMENT & PLAN NOTE
Rapid strep negative, will treat patient based on clinical presentation  To begin amoxicillin 500 mg every 12 hours for 10 days and Bromfed DM as needed for cough  Encouraged patient to begin probiotic while on antibiotic  Counseled on increasing fluid intake, beginning soft diet, and salt water gargles  Follow-up in 1 week if symptoms have not improved or sooner if symptoms worsen

## 2020-03-17 ENCOUNTER — TELEPHONE (OUTPATIENT)
Dept: FAMILY MEDICINE CLINIC | Facility: CLINIC | Age: 50
End: 2020-03-17

## 2020-03-17 DIAGNOSIS — Z71.6 ENCOUNTER FOR SMOKING CESSATION COUNSELING: Primary | ICD-10-CM

## 2020-03-17 RX ORDER — VARENICLINE TARTRATE 25 MG
KIT ORAL
Qty: 53 TABLET | Refills: 0 | Status: SHIPPED | OUTPATIENT
Start: 2020-03-17 | End: 2020-06-22

## 2020-03-17 NOTE — TELEPHONE ENCOUNTER
Patient would like to take chantix for smoking cessation  She has used chantix in the past and has had great success and no side effects  She does feel ready to quit smoking  She is fearful to come into the office due to the coronavirus-- will send in chantix for her and gave her instructions on use  She verbalized understanding and will call with any issues/concerns

## 2020-03-17 NOTE — TELEPHONE ENCOUNTER
She was supposed to have an apt today but does not want to come in because of the germs  She would like to get Rx to stop smoking  Spoke to Melody and she said it is possible to do a telephone visit and prescribe that  She was supposed to see you today

## 2020-06-09 ENCOUNTER — OFFICE VISIT (OUTPATIENT)
Dept: GASTROENTEROLOGY | Facility: CLINIC | Age: 50
End: 2020-06-09
Payer: COMMERCIAL

## 2020-06-09 VITALS
DIASTOLIC BLOOD PRESSURE: 68 MMHG | BODY MASS INDEX: 28.95 KG/M2 | HEART RATE: 70 BPM | HEIGHT: 64 IN | SYSTOLIC BLOOD PRESSURE: 110 MMHG | TEMPERATURE: 98.1 F | WEIGHT: 169.6 LBS

## 2020-06-09 DIAGNOSIS — C18.9 MALIGNANT NEOPLASM OF COLON, UNSPECIFIED PART OF COLON (HCC): Primary | ICD-10-CM

## 2020-06-09 PROCEDURE — 99204 OFFICE O/P NEW MOD 45 MIN: CPT | Performed by: INTERNAL MEDICINE

## 2020-06-22 ENCOUNTER — ANNUAL EXAM (OUTPATIENT)
Dept: OBGYN CLINIC | Facility: CLINIC | Age: 50
End: 2020-06-22
Payer: COMMERCIAL

## 2020-06-22 VITALS
BODY MASS INDEX: 29.95 KG/M2 | SYSTOLIC BLOOD PRESSURE: 134 MMHG | WEIGHT: 169 LBS | DIASTOLIC BLOOD PRESSURE: 62 MMHG | HEIGHT: 63 IN

## 2020-06-22 DIAGNOSIS — R92.2 DENSE BREAST TISSUE: ICD-10-CM

## 2020-06-22 DIAGNOSIS — Z78.0 ASYMPTOMATIC POSTMENOPAUSAL STATUS: ICD-10-CM

## 2020-06-22 DIAGNOSIS — Z85.038 HISTORY OF COLON CANCER: ICD-10-CM

## 2020-06-22 DIAGNOSIS — Z01.419 ENCOUNTER FOR GYNECOLOGICAL EXAMINATION WITHOUT ABNORMAL FINDING: Primary | ICD-10-CM

## 2020-06-22 DIAGNOSIS — Z12.31 ENCOUNTER FOR SCREENING MAMMOGRAM FOR MALIGNANT NEOPLASM OF BREAST: ICD-10-CM

## 2020-06-22 PROBLEM — R92.30 DENSE BREAST TISSUE: Status: ACTIVE | Noted: 2020-06-22

## 2020-06-22 PROCEDURE — 99396 PREV VISIT EST AGE 40-64: CPT | Performed by: NURSE PRACTITIONER

## 2020-07-08 ENCOUNTER — OFFICE VISIT (OUTPATIENT)
Dept: FAMILY MEDICINE CLINIC | Facility: CLINIC | Age: 50
End: 2020-07-08
Payer: COMMERCIAL

## 2020-07-08 VITALS
HEART RATE: 88 BPM | BODY MASS INDEX: 29.94 KG/M2 | TEMPERATURE: 97.3 F | DIASTOLIC BLOOD PRESSURE: 82 MMHG | HEIGHT: 63 IN | SYSTOLIC BLOOD PRESSURE: 124 MMHG | OXYGEN SATURATION: 98 %

## 2020-07-08 DIAGNOSIS — F41.9 ANXIETY: ICD-10-CM

## 2020-07-08 DIAGNOSIS — E78.5 DYSLIPIDEMIA: ICD-10-CM

## 2020-07-08 DIAGNOSIS — Z71.6 ENCOUNTER FOR SMOKING CESSATION COUNSELING: Primary | ICD-10-CM

## 2020-07-08 DIAGNOSIS — Z20.822 CLOSE EXPOSURE TO COVID-19 VIRUS: ICD-10-CM

## 2020-07-08 DIAGNOSIS — Z85.038 HISTORY OF COLON CANCER: ICD-10-CM

## 2020-07-08 DIAGNOSIS — Z11.4 SCREENING FOR HIV (HUMAN IMMUNODEFICIENCY VIRUS): ICD-10-CM

## 2020-07-08 PROCEDURE — 99214 OFFICE O/P EST MOD 30 MIN: CPT | Performed by: FAMILY MEDICINE

## 2020-07-08 PROCEDURE — 1036F TOBACCO NON-USER: CPT | Performed by: FAMILY MEDICINE

## 2020-07-08 RX ORDER — BUPROPION HYDROCHLORIDE 150 MG/1
TABLET, EXTENDED RELEASE ORAL
Qty: 60 TABLET | Refills: 0 | Status: SHIPPED | OUTPATIENT
Start: 2020-07-08 | End: 2020-09-22 | Stop reason: SINTOL

## 2020-07-08 RX ORDER — ATORVASTATIN CALCIUM 20 MG/1
20 TABLET, FILM COATED ORAL DAILY
Qty: 90 TABLET | Refills: 1 | Status: SHIPPED | OUTPATIENT
Start: 2020-07-08 | End: 2020-07-08 | Stop reason: SDUPTHER

## 2020-07-08 RX ORDER — ATORVASTATIN CALCIUM 20 MG/1
20 TABLET, FILM COATED ORAL DAILY
Qty: 90 TABLET | Refills: 1 | Status: SHIPPED | OUTPATIENT
Start: 2020-07-08 | End: 2020-08-03 | Stop reason: ALTCHOICE

## 2020-07-08 NOTE — PATIENT INSTRUCTIONS
Discussed all with patient  Given mood questionnaire which is negative for bipolar  Will use Wellbutrin for the anxiety and smoking cessation  Take 1 pill daily for 3 days then 1 pill twice a day  When you get home today set a stop date in 2-3 weeks  On that day that is it no more cigarettes  Have the labs done fasting try to be consistent with your medications  Take them daily and do not just stop the meds  Follow-up here in 3 weeks  Weight Management   AMBULATORY CARE:   Why it is important to manage your weight:  Being overweight increases your risk of health conditions such as heart disease, high blood pressure, type 2 diabetes, and certain types of cancer  It can also increase your risk for osteoarthritis, sleep apnea, and other respiratory problems  Aim for a slow, steady weight loss  Even a small amount of weight loss can lower your risk of health problems  How to lose weight safely:  A safe and healthy way to lose weight is to eat fewer calories and get regular exercise  You can lose up about 1 pound a week by decreasing the number of calories you eat by 500 calories each day  You can decrease calories by eating smaller portion sizes or by cutting out high-calorie foods  Read labels to find out how many calories are in the foods you eat  You can also burn calories with exercise such as walking, swimming, or biking  You will be more likely to keep weight off if you make these changes part of your lifestyle  Healthy meal plan for weight management:  A healthy meal plan includes a variety of foods, contains fewer calories, and helps you stay healthy  A healthy meal plan includes the following:  · Eat whole-grain foods more often  A healthy meal plan should contain fiber  Fiber is the part of grains, fruits, and vegetables that is not broken down by your body  Whole-grain foods are healthy and provide extra fiber in your diet   Some examples of whole-grain foods are whole-wheat breads and pastas, oatmeal, brown rice, and bulgur  · Eat a variety of vegetables every day  Include dark, leafy greens such as spinach, kale, farideh greens, and mustard greens  Eat yellow and orange vegetables such as carrots, sweet potatoes, and winter squash  · Eat a variety of fruits every day  Choose fresh or canned fruit (canned in its own juice or light syrup) instead of juice  Fruit juice has very little or no fiber  · Eat low-fat dairy foods  Drink fat-free (skim) milk or 1% milk  Eat fat-free yogurt and low-fat cottage cheese  Try low-fat cheeses such as mozzarella and other reduced-fat cheeses  · Choose meat and other protein foods that are low in fat  Choose beans or other legumes such as split peas or lentils  Choose fish, skinless poultry (chicken or turkey), or lean cuts of red meat (beef or pork)  Before you cook meat or poultry, cut off any visible fat  · Use less fat and oil  Try baking foods instead of frying them  Add less fat, such as margarine, sour cream, regular salad dressing and mayonnaise to foods  Eat fewer high-fat foods  Some examples of high-fat foods include french fries, doughnuts, ice cream, and cakes  · Eat fewer sweets  Limit foods and drinks that are high in sugar  This includes candy, cookies, regular soda, and sweetened drinks  Ways to decrease calories:   · Eat smaller portions  ¨ Use a small plate with smaller servings  ¨ Do not eat second helpings  ¨ When you eat at a restaurant, ask for a box and place half of your meal in the box before you eat  ¨ Share an entrée with someone else  · Replace high-calorie snacks with healthy, low-calorie snacks  ¨ Choose fresh fruit, vegetables, fat-free rice cakes, or air-popped popcorn instead of potato chips, nuts, or chocolate  ¨ Choose water or calorie-free drinks instead of soda or sweetened drinks  · Eat regular meals  Skipping meals can lead to overeating later in the day   Eat a healthy snack in place of a meal if you do not have time to eat a regular meal      · Do not shop for groceries when you are hungry  You may be more likely to make unhealthy food choices  Take a grocery list of healthy foods and shop after you have eaten  Exercise:  Exercise at least 30 minutes per day on most days of the week  Some examples of exercise include walking, biking, dancing, and swimming  You can also fit in more physical activity by taking the stairs instead of the elevator or parking farther away from stores  Ask your healthcare provider about the best exercise plan for you  Other things to consider as you try to lose weight:   · Be aware of situations that may give you the urge to overeat, such as eating while watching television  Find ways to avoid these situations  For example, read a book, go for a walk, or do crafts  · Meet with a weight loss support group or friends who are also trying to lose weight  This may help you stay motivated to continue working on your weight loss goals  © 2017 [facility] [facility] Information is for End User's use only and may not be sold, redistributed or otherwise used for commercial purposes  All illustrations and images included in CareNotes® are the copyrighted property of [employer]  or [employer]  The above information is an  only  It is not intended as medical advice for individual conditions or treatments  Talk to your doctor, nurse or pharmacist before following any medical regimen to see if it is safe and effective for you  Low Fat Diet   AMBULATORY CARE:   A low-fat diet  is an eating plan that is low in total fat, unhealthy fat, and cholesterol  You may need to follow a low-fat diet if you have trouble digesting or absorbing fat  You may also need to follow this diet if you have high cholesterol  You can also lower your cholesterol by increasing the amount of fiber in your diet   Soluble fiber is a type of fiber that helps to decrease cholesterol levels  Different types of fat in food:   · Limit unhealthy fats  A diet that is high in cholesterol, saturated fat, and trans fat may cause unhealthy cholesterol levels  Unhealthy cholesterol levels increase your risk of heart disease  ¨ Cholesterol:  Limit intake of cholesterol to less than 200 mg per day  Cholesterol is found in meat, eggs, and dairy  ¨ Saturated fat:  Limit saturated fat to less than 7% of your total daily calories  Ask your dietitian how many calories you need each day  Saturated fat is found in butter, cheese, ice cream, whole milk, and palm oil  Saturated fat is also found in meat, such as beef, pork, chicken skin, and processed meats  Processed meats include sausage, hot dogs, and bologna  ¨ Trans fat:  Avoid trans fat as much as possible  Trans fat is used in fried and baked foods  Foods that say trans fat free on the label may still have up to 0 5 grams of trans fat per serving  · Include healthy fats  Replace foods that are high in saturated and trans fat with foods high in healthy fats  This may help to decrease high cholesterol levels  ¨ Monounsaturated fats: These are found in avocados, nuts, and vegetable oils, such as olive, canola, and sunflower oil  ¨ Polyunsaturated fats: These can be found in vegetable oils, such as soybean or corn oil  Omega-3 fats can help to decrease the risk of heart disease  Omega-3 fats are found in fish, such as salmon, herring, trout, and tuna  Omega-3 fats can also be found in plant foods, such as walnuts, flaxseed, soybeans, and canola oil    Foods to limit or avoid:   · Grains:      ¨ Snacks that are made with partially hydrogenated oils, such as chips, regular crackers, and butter-flavored popcorn    ¨ High-fat baked goods, such as biscuits, croissants, doughnuts, pies, cookies, and pastries    · Dairy:      ¨ Whole milk, 2% milk, and yogurt and ice cream made with whole milk    ¨ Half and half creamer, heavy cream, and whipping cream    ¨ Cheese, cream cheese, and sour cream    · Meats and proteins:      ¨ High-fat cuts of meat (T-bone steak, regular hamburger, and ribs)    ¨ Fried meat, poultry (turkey and chicken), and fish    ¨ Poultry (chicken and turkey) with skin    ¨ Cold cuts (salami or bologna), hot dogs, graves, and sausage    ¨ Whole eggs and egg yolks    · Vegetables and fruits with added fat:      ¨ Fried vegetables or vegetables in butter or high-fat sauces, such as cream or cheese sauces    ¨ Fried fruit or fruit served with butter or cream    · Fats:      ¨ Butter, stick margarine, and shortening    ¨ Coconut, palm oil, and palm kernel oil  Foods to include:   · Grains:      ¨ Whole-grain breads, cereals, pasta, and brown rice    ¨ Low-fat crackers and pretzels    · Vegetables and fruits:      ¨ Fresh, frozen, or canned vegetables (no salt or low-sodium)    ¨ Fresh, frozen, dried, or canned fruit (canned in light syrup or fruit juice)    ¨ Avocado    · Low-fat dairy products:      ¨ Nonfat (skim) or 1% milk    ¨ Nonfat or low-fat cheese, yogurt, and cottage cheese    · Meats and proteins:      ¨ Chicken or turkey with no skin    ¨ Baked or broiled fish    ¨ Lean beef and pork (loin, round, extra lean hamburger)    ¨ Beans and peas, unsalted nuts, soy products    ¨ Egg whites and substitutes    ¨ Seeds and nuts    · Fats:      ¨ Unsaturated oil, such as canola, olive, peanut, soybean, or sunflower oil    ¨ Soft or liquid margarine and vegetable oil spread    ¨ Low-fat salad dressing  Other ways to decrease fat:   · Read food labels before you buy foods  Choose foods that have less than 30% of calories from fat  Choose low-fat or fat-free dairy products  Remember that fat free does not mean calorie free  These foods still contain calories, and too many calories can lead to weight gain  · Trim fat from meat and avoid fried food  Trim all visible fat from meat before you cook it   Remove the skin from poultry  Do not garcía meat, fish, or poultry  Bake, roast, boil, or broil these foods instead  Avoid fried foods  Eat a baked potato instead of [place] fries  Steam vegetables instead of sautéing them in butter  · Add less fat to foods  Use imitation graves bits on salads and baked potatoes instead of regular graves bits  Use fat-free or low-fat salad dressings instead of regular dressings  Use low-fat or nonfat butter-flavored topping instead of regular butter or margarine on popcorn and other foods  Ways to decrease fat in recipes:  Replace high-fat ingredients with low-fat or nonfat ones  This may cause baked goods to be drier than usual  You may need to use nonfat cooking spray on pans to prevent food from sticking  You also may need to change the amount of other ingredients, such as water, in the recipe  Try the following:  · Use low-fat or light margarine instead of regular margarine or shortening  · Use lean ground turkey breast or chicken, or lean ground beef (less than 5% fat) instead of hamburger  · Add 1 teaspoon of canola oil to 8 ounces of skim milk instead of using cream or half and half  · Use grated zucchini, carrots, or apples in breads instead of coconut  · Use blenderized, low-fat cottage cheese, plain tofu, or low-fat ricotta cheese instead of cream cheese  · Use 1 egg white and 1 teaspoon of canola oil, or use ¼ cup (2 ounces) of fat-free egg substitute instead of a whole egg  · Replace half of the oil that is called for in a recipe with applesauce when you bake  Use 3 tablespoons of cocoa powder and 1 tablespoon of canola oil instead of a square of baking chocolate  How to increase fiber:  Eat enough high-fiber foods to get 20 to 30 grams of fiber every day  Slowly increase your fiber intake to avoid stomach cramps, gas, and other problems  · Eat 3 ounces of whole-grain foods each day  An ounce is about 1 slice of bread   Eat whole-grain breads, such as whole-wheat bread  Whole wheat, whole-wheat flour, or other whole grains should be listed as the first ingredient on the food label  Replace white flour with whole-grain flour or use half of each in recipes  Whole-grain flour is heavier than white flour, so you may have to add more yeast or baking powder  · Eat a high-fiber cereal for breakfast   Oatmeal is a good source of soluble fiber  Look for cereals that have bran or fiber in the name  Choose whole-grain products, such as brown rice, barley, and whole-wheat pasta  · Eat more beans, peas, and lentils  For example, add beans to soups or salads  Eat at least 5 cups of fruits and vegetables each day  Eat fruits and vegetables with the peel because the peel is high in fiber  © 2017 [address] [employer] Information is for End User's use only and may not be sold, redistributed or otherwise used for commercial purposes  All illustrations and images included in CareNotes® are the copyrighted property of [employer] M , Inc  or [employer]  The above information is an  only  It is not intended as medical advice for individual conditions or treatments  Talk to your doctor, nurse or pharmacist before following any medical regimen to see if it is safe and effective for you  Heart Healthy Diet   AMBULATORY CARE:   A heart healthy diet  is an eating plan low in total fat, unhealthy fats, and sodium (salt)  A heart healthy diet helps decrease your risk for heart disease and stroke  Limit the amount of fat you eat to 25% to 35% of your total daily calories  Limit sodium to less than 2,300 mg each day  Healthy fats:  Healthy fats can help improve cholesterol levels  The risk for heart disease is decreased when cholesterol levels are normal  Choose healthy fats, such as the following:  · Unsaturated fat  is found in foods such as soybean, canola, olive, corn, and safflower oils   It is also found in soft tub margarine that is made with liquid vegetable oil  · Omega-3 fat  is found in certain fish, such as salmon, tuna, and trout, and in walnuts and flaxseed  Unhealthy fats:  Unhealthy fats can cause unhealthy cholesterol levels in your blood and increase your risk of heart disease  Limit unhealthy fats, such as the following:  · Cholesterol  is found in animal foods, such as eggs and lobster, and in dairy products made from whole milk  Limit cholesterol to less than 300 milligrams (mg) each day  You may need to limit cholesterol to 200 mg each day if you have heart disease  · Saturated fat  is found in meats, such as graves and hamburger  It is also found in chicken or turkey skin, whole milk, and butter  Limit saturated fat to less than 7% of your total daily calories  Limit saturated fat to less than 6% if you have heart disease or are at increased risk for it  · Trans fat  is found in packaged foods, such as potato chips and cookies  It is also in hard margarine, some fried foods, and shortening  Avoid trans fats as much as possible    Heart healthy foods and drinks to include:  Ask your dietitian or healthcare provider how many servings to have from each of the following food groups:  · Grains:      ¨ Whole-wheat breads, cereals, and pastas, and brown rice    ¨ Low-fat, low-sodium crackers and chips    · Vegetables:      ¨ Broccoli, green beans, green peas, and spinach    ¨ Collards, kale, and lima beans    ¨ Carrots, sweet potatoes, tomatoes, and peppers    ¨ Canned vegetables with no salt added    · Fruits:      ¨ Bananas, peaches, pears, and pineapple    ¨ Grapes, raisins, and dates    ¨ Oranges, tangerines, grapefruit, orange juice, and grapefruit juice    ¨ Apricots, mangoes, melons, and papaya    ¨ Raspberries and strawberries    ¨ Canned fruit with no added sugar    · Low-fat dairy products:      ¨ Nonfat (skim) milk, 1% milk, and low-fat almond, cashew, or soy milks fortified with calcium    ¨ Low-fat cheese, regular or frozen yogurt, and cottage cheese    · Meats and proteins , such as lean cuts of beef and pork (loin, leg, round), skinless chicken and turkey, legumes, soy products, egg whites, and nuts  Foods and drinks to limit or avoid:  Ask your dietitian or healthcare provider about these and other foods that are high in unhealthy fat, sodium, and sugar:  · Snack or packaged foods , such as frozen dinners, cookies, macaroni and cheese, and cereals with more than 300 mg of sodium per serving    · Canned or dry mixes  for cakes, soups, sauces, or gravies    · Vegetables with added sodium , such as instant potatoes, vegetables with added sauces, or regular canned vegetables    · Other foods high in sodium , such as ketchup, barbecue sauce, salad dressing, pickles, olives, soy sauce, and miso    · High-fat dairy foods  such as whole or 2% milk, cream cheese, or sour cream, and cheeses     · High-fat protein foods  such as high-fat cuts of beef (T-bone steaks, ribs), chicken or turkey with skin, and organ meats, such as liver    · Cured or smoked meats , such as hot dogs, graves, and sausage    · Unhealthy fats and oils , such as butter, stick margarine, shortening, and cooking oils such as coconut or palm oil    · Food and drinks high in sugar , such as soft drinks (soda), sports drinks, sweetened tea, candy, cake, cookies, pies, and doughnuts  Other diet guidelines to follow:   · Eat more foods containing omega-3 fats  Eat fish high in omega-3 fats at least 2 times a week  · Limit alcohol  Too much alcohol can damage your heart and raise your blood pressure  Women should limit alcohol to 1 drink a day  Men should limit alcohol to 2 drinks a day  A drink of alcohol is 12 ounces of beer, 5 ounces of wine, or 1½ ounces of liquor  · Choose low-sodium foods  High-sodium foods can lead to high blood pressure  Add little or no salt to food you prepare  Use herbs and spices in place of salt      · Eat more fiber  to help lower cholesterol levels  Eat at least 5 servings of fruits and vegetables each day  Eat 3 ounces of whole-grain foods each day  Legumes (beans) are also a good source of fiber  Lifestyle guidelines:   · Do not smoke  Nicotine and other chemicals in cigarettes and cigars can cause lung and heart damage  Ask your healthcare provider for information if you currently smoke and need help to quit  E-cigarettes or smokeless tobacco still contain nicotine  Talk to your healthcare provider before you use these products  · Exercise regularly  to help you maintain a healthy weight and improve your blood pressure and cholesterol levels  Ask your healthcare provider about the best exercise plan for you  Do not start an exercise program without asking your healthcare provider  Follow up with your healthcare provider as directed:  Write down your questions so you remember to ask them during your visits  © 2017 [address]  Information is for End User's use only and may not be sold, redistributed or otherwise used for commercial purposes  All illustrations and images included in CareNotes® are the copyrighted property of [employer] Inc  or [employer]  The above information is an  only  It is not intended as medical advice for individual conditions or treatments  Talk to your doctor, nurse or pharmacist before following any medical regimen to see if it is safe and effective for you  Calorie Counting Diet   WHAT YOU NEED TO KNOW:   What is a calorie counting diet? It is a meal plan based on counting calories each day to reach a healthy body weight  You will need to eat fewer calories if you are trying to lose weight  Weight loss may decrease your risk for certain health problems or improve your health if you have health problems  Some of these health problems include heart disease, high blood pressure, and diabetes  What foods should I avoid?   Your dietitian will tell you if you need to avoid certain foods based on your body weight and health condition  You may need to avoid high-fat foods if you are at risk for or have heart disease  You may need to eat fewer foods from the breads and starches food group if you have diabetes  How many calories are in foods? The following is a list of foods and drinks with the approximate number of calories in each  Check the food label to find the exact number of calories  A dietitian can tell you how many calories you should have from each food group each day    · Carbohydrate:      ¨ ½ of a 3-inch bagel, 1 slice of bread, or ½ of a hamburger bun or hot dog bun (80)    ¨ 1 (8-inch) flour tortilla or ½ cup of cooked rice (100)    ¨ 1 (6-inch) corn tortilla (80)    ¨ 1 (6-inch) pancake or 1 cup of bran flakes cereal (110)    ¨ ½ cup of cooked cereal (80)    ¨ ½ cup of cooked pasta (85)    ¨ 1 ounce of pretzels (100)    ¨ 3 cups of air-popped popcorn without butter or oil (80)    · Dairy:      ¨ 1 cup of skim or 1% milk (90)    ¨ 1 cup of 2% milk (120)    ¨ 1 cup of whole milk (160)    ¨ 1 cup of 2% chocolate milk (220)    ¨ 1 ounce of low-fat cheese with 3 grams of fat per ounce (70)    ¨ 1 ounce of cheddar cheese (114)    ¨ ½ cup of 1% fat cottage cheese (80)    ¨ 1 cup of plain or sugar-free, fat-free yogurt (90)    · Protein foods:      ¨ 3 ounces of fish (not breaded or fried) (95)    ¨ 3 ounces of breaded, fried fish (195)    ¨ ¾ cup of tuna canned in water (105)    ¨ 3 ounces of chicken breast without skin (105)    ¨ 1 fried chicken breast with skin (350)    ¨ ¼ cup of fat free egg substitute (40)    ¨ 1 large egg (75)    ¨ 3 ounces of lean beef or pork (165)    ¨ 3 ounces of fried pork chop or ham (185)    ¨ ½ cup of cooked dried beans, such as kidney, richards, lentils, or navy (115)    ¨ 3 ounces of bologna or lunch meat (225)    ¨ 2 links of breakfast sausage (140)    · Vegetables:      ¨ ½ cup of sliced mushrooms (10)    ¨ 1 cup of salad greens, such as lettuce, spinach, or reese (15)    ¨ ½ cup of steamed asparagus (20)    ¨ ½ cup of cooked summer squash, zucchini squash, or green or wax beans (25)    ¨ 1 cup of broccoli or cauliflower florets, or 1 medium tomato (25)    ¨ 1 large raw carrot or ½ cup of cooked carrots (40)    ¨ ? of a medium cucumber or 1 stalk of celery (5)    ¨ 1 small baked potato (160)    ¨ 1 cup of breaded, fried vegetables (230)    · Fruit:      ¨ 1 (6-inch) banana (55)     ¨ ½ of a 4-inch grapefruit (55)    ¨ 15 grapes (60)    ¨ 1 medium orange or apple (70)    ¨ 1 large peach (65)    ¨ 1 cup of fresh pineapple chunks (75)    ¨ 1 cup of melon cubes (50)    ¨ 1¼ cups of whole strawberries (45)    ¨ ½ cup of fruit canned in juice (55)    ¨ ½ cup of fruit canned in heavy syrup (110)    ¨ ?  cup of raisins (130)    ¨ ½ cup of unsweetened fruit juice (60)    ¨ ½ cup of grape, cranberry, or prune juice (90)    · Fat:      ¨ 10 peanuts or 2 teaspoons of peanut butter (55)    ¨ 2 tablespoons of avocado or 1 tablespoon of regular salad dressing (45)    ¨ 2 slices of graves (90)    ¨ 1 teaspoon of oil, such as safflower, canola, corn, or olive oil (45)    ¨ 2 teaspoons of low-fat margarine, or 1 tablespoon of low-fat mayonnaise (50)    ¨ 1 teaspoon of regular margarine (40)    ¨ 1 tablespoon of regular mayonnaise (135)    ¨ 1 tablespoon of cream cheese or 2 tablespoons of low-fat cream cheese (45)    ¨ 2 tablespoons of vegetable shortening (215)    · Dessert and sweets:      ¨ 8 animal crackers or 5 vanilla wafers (80)    ¨ 1 frozen fruit juice bar (80)    ¨ ½ cup of ice milk or low-fat frozen yogurt (90)    ¨ ½ cup of sherbet or sorbet (125)    ¨ ½ cup of sugar-free pudding or custard (60)    ¨ ½ cup of ice cream (140)    ¨ ½ cup of pudding or custard (175)    ¨ 1 (2-inch) square chocolate brownie (185)    · Combination foods:      ¨ Bean burrito made with an 8-inch tortilla, without cheese (275)    ¨ Chicken breast sandwich with lettuce and tomato (325)    ¨ 1 cup of chicken noodle soup (60)    ¨ 1 beef taco (175)    ¨ Regular hamburger with lettuce and tomato (310)    ¨ Regular cheeseburger with lettuce and tomato (410)     ¨ ¼ of a 12-inch cheese pizza (280)    ¨ Fried fish sandwich with lettuce and tomato (425)    ¨ Hot dog and bun (275)    ¨ 1½ cups of macaroni and cheese (310)    ¨ Taco salad with a fried tortilla shell (870)    · Low-calorie foods:      ¨ 1 tablespoon of ketchup or 1 tablespoon of fat free sour cream (15)    ¨ 1 teaspoon of mustard (5)    ¨ ¼ cup of salsa (20)    ¨ 1 large dill pickle (15)    ¨ 1 tablespoon of fat free salad dressing (10)    ¨ 2 teaspoons of low-sugar, light jam or jelly, or 1 tablespoon of sugar-free syrup (15)    ¨ 1 sugar-free popsicle (15)    ¨ 1 cup of club soda, seltzer water, or diet soda (0)  CARE AGREEMENT:   You have the right to help plan your care  Discuss treatment options with your caregivers to decide what care you want to receive  You always have the right to refuse treatment  The above information is an  only  It is not intended as medical advice for individual conditions or treatments  Talk to your doctor, nurse or pharmacist before following any medical regimen to see if it is safe and effective for you  © 2017 [employer] Information is for End User's use only and may not be sold, redistributed or otherwise used for commercial purposes  All illustrations and images included in CareNotes® are the copyrighted property of [employer]  or Hugh Soni

## 2020-07-08 NOTE — ASSESSMENT & PLAN NOTE
Keep the f/u appt with Dr Steven Treviño and [facility] AUTHORITY

## 2020-07-08 NOTE — PROGRESS NOTES
Assessment/Plan:     Chronic Problems:  History of colon cancer  Keep the f/u appt with Dr Brigida Florentino and [facility]  Visit Diagnosis:  Diagnoses and all orders for this visit:    Encounter for smoking cessation counseling  Comments:  Given wellbutrin with instructions for stop date   Orders:  -     buPROPion (WELLBUTRIN SR) 150 mg 12 hr tablet; Take 1 pill daily for 3 days then bid  Dyslipidemia  Comments:  Cholesterol is at goal, but would be better if she took meds every day  Orders:  -     Discontinue: atorvastatin (LIPITOR) 20 mg tablet; Take 1 tablet (20 mg total) by mouth daily  -     atorvastatin (LIPITOR) 20 mg tablet; Take 1 tablet (20 mg total) by mouth daily  -     Comprehensive metabolic panel; Future  -     Lipid panel; Future  -     Microalbumin / creatinine urine ratio  -     Urinalysis with microscopic    History of colon cancer    Anxiety  Comments: Will start wellbutrin 1 daily for 3 days then bid  F/u her in 3 weeks set a stop date  Mood questionnarire negative for bipolar  Orders:  -     buPROPion (WELLBUTRIN SR) 150 mg 12 hr tablet; Take 1 pill daily for 3 days then bid  BMI 29 0-29 9,adult    Screening for HIV (human immunodeficiency virus)  Comments:  Accepted screening for hiv ;   Orders:  -     HIV 1/2 Antigen/Antibody (4th Generation) w Reflex [facility]; Future    Close exposure to COVID-19 virus  -     SARS-CoV2 Antibody, Total (IgG, IgA, IgM) [facility]- Lab Collect; Future          Subjective:    Patient ID: Kylah Bautista is a 54 y o  female  Pt is here for routine f/u appt  Pt is s/p chemo and radiation for colon cancer and currently in remission  Goes to [facility] once yearly  Doing well  Seen by gi, Dr Brigida Florentino and due for colonoscopy next year  Pt never started the fluoxetine  Started smoking and needs help to quit  When she gets down she reverts back to smoking  Feels she struggles with anxiety and depression  Highs are high and lows are low  Feels she can flip in a second  Thought she had covid in January  Called in March for chantix but thinks it is too much for her  Reviewed meds with pt  Pt is not consistent with her statin  No side effects  Pt feels she had covid in January and requesting covid antibody testing  The following portions of the patient's history were reviewed and updated as appropriate: allergies, current medications, past family history, past medical history, past social history, past surgical history and problem list     Review of Systems   Constitutional: Negative for chills, diaphoresis, fatigue and fever  HENT: Negative  Eyes: Negative  Respiratory: Negative for cough, shortness of breath and wheezing  Smokes 1/2 ppd   Cardiovascular: Negative for chest pain and palpitations  Gastrointestinal: Negative  Genitourinary: Negative for dysuria, frequency and urgency  FDLMP -  s/p chemo  Spots at times  Some stress incontinence  Neurological: Negative for dizziness, light-headedness and headaches  Psychiatric/Behavioral: Positive for sleep disturbance (worries too much to sleep  )  Negative for dysphoric mood  The patient is nervous/anxious            /82   Pulse 88   Temp (!) 97 3 °F (36 3 °C)   Ht 5' 3" (1 6 m)   LMP 2014 (Within Days) Comment: Medical induced menopsuse   SpO2 98%   BMI 29 94 kg/m²   Social History     Socioeconomic History    Marital status: /Civil Union     Spouse name: Not on file    Number of children: Not on file    Years of education: Not on file    Highest education level: Not on file   Occupational History    Occupation: EMPLOYED   Social Needs    Financial resource strain: Not on file    Food insecurity:     Worry: Not on file     Inability: Not on file    Transportation needs:     Medical: Not on file     Non-medical: Not on file   Tobacco Use    Smoking status: Former Smoker     Last attempt to quit:      Years since quittin 5    Smokeless tobacco: Never Used   Substance and Sexual Activity    Alcohol use: Yes     Frequency: Monthly or less    Drug use: No    Sexual activity: Not Currently     Birth control/protection: Post-menopausal   Lifestyle    Physical activity:     Days per week: Not on file     Minutes per session: Not on file    Stress: Not on file   Relationships    Social connections:     Talks on phone: Not on file     Gets together: Not on file     Attends Shinto service: Not on file     Active member of club or organization: Not on file     Attends meetings of clubs or organizations: Not on file     Relationship status: Not on file    Intimate partner violence:     Fear of current or ex partner: Not on file     Emotionally abused: Not on file     Physically abused: Not on file     Forced sexual activity: Not on file   Other Topics Concern    Not on file   Social History Narrative    Not on file     Past Medical History:   Diagnosis Date    Cancer [facility])     Colon cancer ([facility] [address] ) 2014    History of chemotherapy 2014    colon ca    History of radiation therapy 2014    colon ca    History of tear of meniscus of knee joint     Hyperlipidemia     Tennis elbow      Family History   Problem Relation Age of Onset    Diabetes Mother     Heart failure Mother     Heart defect Father         CARDIAC PACEMAKER    Hip fracture Father         HIP REPLACEMENT    Breast cancer Sister 52    BRCA1 Negative Sister     BRCA2 Negative Sister     Lung cancer Maternal Grandmother     Breast cancer Maternal Aunt 46        again at 72    Colon cancer Maternal Aunt     No Known Problems Paternal Aunt     No Known Problems Paternal Aunt     No Known Problems Paternal Aunt     No Known Problems Paternal Aunt     Ovarian cancer Neg Hx     Uterine cancer Neg Hx     Cervical cancer Neg Hx     Endometrial cancer Neg Hx      Past Surgical History:   Procedure Laterality Date    BREAST SURGERY Bilateral     RECONSTRUCION WITH IMPLANT PROTHESIS     COLON SURGERY      ELBOW SURGERY Left 2015    Tennis elbow    KNEE SURGERY      MENISCECTOMY Left 06/2016       Current Outpatient Medications:     atorvastatin (LIPITOR) 20 mg tablet, Take 1 tablet (20 mg total) by mouth daily, Disp: 90 tablet, Rfl: 1    buPROPion (WELLBUTRIN SR) 150 mg 12 hr tablet, Take 1 pill daily for 3 days then bid , Disp: 60 tablet, Rfl: 0    Allergies   Allergen Reactions    Iodinated Diagnostic Agents           Lab Review   No visits with results within 2 Month(s) from this visit  Latest known visit with results is:   Office Visit on 01/06/2020   Component Date Value     RAPID STREP A 01/06/2020 Negative         Imaging: No results found  Objective:     Physical Exam   Constitutional: She is oriented to person, place, and time  She appears well-developed and well-nourished  No distress  HENT:   Head: Normocephalic and atraumatic  Right Ear: External ear normal    Left Ear: External ear normal    Mouth/Throat: Oropharynx is clear and moist    Eyes: Pupils are equal, round, and reactive to light  Conjunctivae and EOM are normal  Right eye exhibits no discharge  Left eye exhibits no discharge  Neck: Normal range of motion  Neck supple  Cardiovascular: Normal rate, regular rhythm and normal heart sounds  Exam reveals no friction rub  No murmur heard  Pulmonary/Chest: Effort normal and breath sounds normal  No respiratory distress  She has no wheezes  She has no rales  She exhibits no tenderness  Musculoskeletal: Normal range of motion  She exhibits no edema, tenderness or deformity  Lymphadenopathy:     She has no cervical adenopathy  Neurological: She is alert and oriented to person, place, and time  No cranial nerve deficit  Skin: Skin is warm and dry  No rash noted  She is not diaphoretic  Psychiatric: She has a normal mood and affect  Her behavior is normal  Judgment and thought content normal          Patient Instructions   Discussed all with patient  Given mood questionnaire which is negative for bipolar  Will use Wellbutrin for the anxiety and smoking cessation  Take 1 pill daily for 3 days then 1 pill twice a day  When you get home today set a stop date in 2-3 weeks  On that day that is it no more cigarettes  Have the labs done fasting try to be consistent with your medications  Take them daily and do not just stop the meds  Follow-up here in 3 weeks  Weight Management   AMBULATORY CARE:   Why it is important to manage your weight:  Being overweight increases your risk of health conditions such as heart disease, high blood pressure, type 2 diabetes, and certain types of cancer  It can also increase your risk for osteoarthritis, sleep apnea, and other respiratory problems  Aim for a slow, steady weight loss  Even a small amount of weight loss can lower your risk of health problems  How to lose weight safely:  A safe and healthy way to lose weight is to eat fewer calories and get regular exercise  You can lose up about 1 pound a week by decreasing the number of calories you eat by 500 calories each day  You can decrease calories by eating smaller portion sizes or by cutting out high-calorie foods  Read labels to find out how many calories are in the foods you eat  You can also burn calories with exercise such as walking, swimming, or biking  You will be more likely to keep weight off if you make these changes part of your lifestyle  Healthy meal plan for weight management:  A healthy meal plan includes a variety of foods, contains fewer calories, and helps you stay healthy  A healthy meal plan includes the following:  · Eat whole-grain foods more often  A healthy meal plan should contain fiber  Fiber is the part of grains, fruits, and vegetables that is not broken down by your body  Whole-grain foods are healthy and provide extra fiber in your diet   Some examples of whole-grain foods are whole-wheat breads and pastas, oatmeal, brown rice, and bulgur  · Eat a variety of vegetables every day  Include dark, leafy greens such as spinach, kale, farideh greens, and mustard greens  Eat yellow and orange vegetables such as carrots, sweet potatoes, and winter squash  · Eat a variety of fruits every day  Choose fresh or canned fruit (canned in its own juice or light syrup) instead of juice  Fruit juice has very little or no fiber  · Eat low-fat dairy foods  Drink fat-free (skim) milk or 1% milk  Eat fat-free yogurt and low-fat cottage cheese  Try low-fat cheeses such as mozzarella and other reduced-fat cheeses  · Choose meat and other protein foods that are low in fat  Choose beans or other legumes such as split peas or lentils  Choose fish, skinless poultry (chicken or turkey), or lean cuts of red meat (beef or pork)  Before you cook meat or poultry, cut off any visible fat  · Use less fat and oil  Try baking foods instead of frying them  Add less fat, such as margarine, sour cream, regular salad dressing and mayonnaise to foods  Eat fewer high-fat foods  Some examples of high-fat foods include french fries, doughnuts, ice cream, and cakes  · Eat fewer sweets  Limit foods and drinks that are high in sugar  This includes candy, cookies, regular soda, and sweetened drinks  Ways to decrease calories:   · Eat smaller portions  ¨ Use a small plate with smaller servings  ¨ Do not eat second helpings  ¨ When you eat at a restaurant, ask for a box and place half of your meal in the box before you eat  ¨ Share an entrée with someone else  · Replace high-calorie snacks with healthy, low-calorie snacks  ¨ Choose fresh fruit, vegetables, fat-free rice cakes, or air-popped popcorn instead of potato chips, nuts, or chocolate  ¨ Choose water or calorie-free drinks instead of soda or sweetened drinks  · Eat regular meals  Skipping meals can lead to overeating later in the day   Eat a healthy snack in place of a meal if you do not have time to eat a regular meal      · Do not shop for groceries when you are hungry  You may be more likely to make unhealthy food choices  Take a grocery list of healthy foods and shop after you have eaten  Exercise:  Exercise at least 30 minutes per day on most days of the week  Some examples of exercise include walking, biking, dancing, and swimming  You can also fit in more physical activity by taking the stairs instead of the elevator or parking farther away from stores  Ask your healthcare provider about the best exercise plan for you  Other things to consider as you try to lose weight:   · Be aware of situations that may give you the urge to overeat, such as eating while watching television  Find ways to avoid these situations  For example, read a book, go for a walk, or do crafts  · Meet with a weight loss support group or friends who are also trying to lose weight  This may help you stay motivated to continue working on your weight loss goals  © 2017 [address]  Information is for End User's use only and may not be sold, redistributed or otherwise used for commercial purposes  All illustrations and images included in CareNotes® are the copyrighted property of [employer]  or [employer]  The above information is an  only  It is not intended as medical advice for individual conditions or treatments  Talk to your doctor, nurse or pharmacist before following any medical regimen to see if it is safe and effective for you  Low Fat Diet   AMBULATORY CARE:   A low-fat diet  is an eating plan that is low in total fat, unhealthy fat, and cholesterol  You may need to follow a low-fat diet if you have trouble digesting or absorbing fat  You may also need to follow this diet if you have high cholesterol  You can also lower your cholesterol by increasing the amount of fiber in your diet   Soluble fiber is a type of fiber that helps to decrease cholesterol levels  Different types of fat in food:   · Limit unhealthy fats  A diet that is high in cholesterol, saturated fat, and trans fat may cause unhealthy cholesterol levels  Unhealthy cholesterol levels increase your risk of heart disease  ¨ Cholesterol:  Limit intake of cholesterol to less than 200 mg per day  Cholesterol is found in meat, eggs, and dairy  ¨ Saturated fat:  Limit saturated fat to less than 7% of your total daily calories  Ask your dietitian how many calories you need each day  Saturated fat is found in butter, cheese, ice cream, whole milk, and palm oil  Saturated fat is also found in meat, such as beef, pork, chicken skin, and processed meats  Processed meats include sausage, hot dogs, and bologna  ¨ Trans fat:  Avoid trans fat as much as possible  Trans fat is used in fried and baked foods  Foods that say trans fat free on the label may still have up to 0 5 grams of trans fat per serving  · Include healthy fats  Replace foods that are high in saturated and trans fat with foods high in healthy fats  This may help to decrease high cholesterol levels  ¨ Monounsaturated fats: These are found in avocados, nuts, and vegetable oils, such as olive, canola, and sunflower oil  ¨ Polyunsaturated fats: These can be found in vegetable oils, such as soybean or corn oil  Omega-3 fats can help to decrease the risk of heart disease  Omega-3 fats are found in fish, such as salmon, herring, trout, and tuna  Omega-3 fats can also be found in plant foods, such as walnuts, flaxseed, soybeans, and canola oil    Foods to limit or avoid:   · Grains:      ¨ Snacks that are made with partially hydrogenated oils, such as chips, regular crackers, and butter-flavored popcorn    ¨ High-fat baked goods, such as biscuits, croissants, doughnuts, pies, cookies, and pastries    · Dairy:      ¨ Whole milk, 2% milk, and yogurt and ice cream made with whole milk    ¨ Half and half creamer, heavy cream, and whipping cream    ¨ Cheese, cream cheese, and sour cream    · Meats and proteins:      ¨ High-fat cuts of meat (T-bone steak, regular hamburger, and ribs)    ¨ Fried meat, poultry (turkey and chicken), and fish    ¨ Poultry (chicken and turkey) with skin    ¨ Cold cuts (salami or bologna), hot dogs, graves, and sausage    ¨ Whole eggs and egg yolks    · Vegetables and fruits with added fat:      ¨ Fried vegetables or vegetables in butter or high-fat sauces, such as cream or cheese sauces    ¨ Fried fruit or fruit served with butter or cream    · Fats:      ¨ Butter, stick margarine, and shortening    ¨ Coconut, palm oil, and palm kernel oil  Foods to include:   · Grains:      ¨ Whole-grain breads, cereals, pasta, and brown rice    ¨ Low-fat crackers and pretzels    · Vegetables and fruits:      ¨ Fresh, frozen, or canned vegetables (no salt or low-sodium)    ¨ Fresh, frozen, dried, or canned fruit (canned in light syrup or fruit juice)    ¨ Avocado    · Low-fat dairy products:      ¨ Nonfat (skim) or 1% milk    ¨ Nonfat or low-fat cheese, yogurt, and cottage cheese    · Meats and proteins:      ¨ Chicken or turkey with no skin    ¨ Baked or broiled fish    ¨ Lean beef and pork (loin, round, extra lean hamburger)    ¨ Beans and peas, unsalted nuts, soy products    ¨ Egg whites and substitutes    ¨ Seeds and nuts    · Fats:      ¨ Unsaturated oil, such as canola, olive, peanut, soybean, or sunflower oil    ¨ Soft or liquid margarine and vegetable oil spread    ¨ Low-fat salad dressing  Other ways to decrease fat:   · Read food labels before you buy foods  Choose foods that have less than 30% of calories from fat  Choose low-fat or fat-free dairy products  Remember that fat free does not mean calorie free  These foods still contain calories, and too many calories can lead to weight gain  · Trim fat from meat and avoid fried food  Trim all visible fat from meat before you cook it  Remove the skin from poultry   Do not garcía meat, fish, or poultry  Bake, roast, boil, or broil these foods instead  Avoid fried foods  Eat a baked potato instead of [place] fries  Steam vegetables instead of sautéing them in butter  · Add less fat to foods  Use imitation graves bits on salads and baked potatoes instead of regular graves bits  Use fat-free or low-fat salad dressings instead of regular dressings  Use low-fat or nonfat butter-flavored topping instead of regular butter or margarine on popcorn and other foods  Ways to decrease fat in recipes:  Replace high-fat ingredients with low-fat or nonfat ones  This may cause baked goods to be drier than usual  You may need to use nonfat cooking spray on pans to prevent food from sticking  You also may need to change the amount of other ingredients, such as water, in the recipe  Try the following:  · Use low-fat or light margarine instead of regular margarine or shortening  · Use lean ground turkey breast or chicken, or lean ground beef (less than 5% fat) instead of hamburger  · Add 1 teaspoon of canola oil to 8 ounces of skim milk instead of using cream or half and half  · Use grated zucchini, carrots, or apples in breads instead of coconut  · Use blenderized, low-fat cottage cheese, plain tofu, or low-fat ricotta cheese instead of cream cheese  · Use 1 egg white and 1 teaspoon of canola oil, or use ¼ cup (2 ounces) of fat-free egg substitute instead of a whole egg  · Replace half of the oil that is called for in a recipe with applesauce when you bake  Use 3 tablespoons of cocoa powder and 1 tablespoon of canola oil instead of a square of baking chocolate  How to increase fiber:  Eat enough high-fiber foods to get 20 to 30 grams of fiber every day  Slowly increase your fiber intake to avoid stomach cramps, gas, and other problems  · Eat 3 ounces of whole-grain foods each day  An ounce is about 1 slice of bread  Eat whole-grain breads, such as whole-wheat bread   Whole wheat, whole-wheat flour, or other whole grains should be listed as the first ingredient on the food label  Replace white flour with whole-grain flour or use half of each in recipes  Whole-grain flour is heavier than white flour, so you may have to add more yeast or baking powder  · Eat a high-fiber cereal for breakfast   Oatmeal is a good source of soluble fiber  Look for cereals that have bran or fiber in the name  Choose whole-grain products, such as brown rice, barley, and whole-wheat pasta  · Eat more beans, peas, and lentils  For example, add beans to soups or salads  Eat at least 5 cups of fruits and vegetables each day  Eat fruits and vegetables with the peel because the peel is high in fiber  © 2017 [facility] Information is for End User's use only and may not be sold, redistributed or otherwise used for commercial purposes  All illustrations and images included in CareNotes® are the copyrighted property of [employer]  or [employer]i  The above information is an  only  It is not intended as medical advice for individual conditions or treatments  Talk to your doctor, nurse or pharmacist before following any medical regimen to see if it is safe and effective for you  Heart Healthy Diet   AMBULATORY CARE:   A heart healthy diet  is an eating plan low in total fat, unhealthy fats, and sodium (salt)  A heart healthy diet helps decrease your risk for heart disease and stroke  Limit the amount of fat you eat to 25% to 35% of your total daily calories  Limit sodium to less than 2,300 mg each day  Healthy fats:  Healthy fats can help improve cholesterol levels  The risk for heart disease is decreased when cholesterol levels are normal  Choose healthy fats, such as the following:  · Unsaturated fat  is found in foods such as soybean, canola, olive, corn, and safflower oils  It is also found in soft tub margarine that is made with liquid vegetable oil  · Omega-3 fat  is found in certain fish, such as salmon, tuna, and trout, and in walnuts and flaxseed  Unhealthy fats:  Unhealthy fats can cause unhealthy cholesterol levels in your blood and increase your risk of heart disease  Limit unhealthy fats, such as the following:  · Cholesterol  is found in animal foods, such as eggs and lobster, and in dairy products made from whole milk  Limit cholesterol to less than 300 milligrams (mg) each day  You may need to limit cholesterol to 200 mg each day if you have heart disease  · Saturated fat  is found in meats, such as graves and hamburger  It is also found in chicken or turkey skin, whole milk, and butter  Limit saturated fat to less than 7% of your total daily calories  Limit saturated fat to less than 6% if you have heart disease or are at increased risk for it  · Trans fat  is found in packaged foods, such as potato chips and cookies  It is also in hard margarine, some fried foods, and shortening  Avoid trans fats as much as possible    Heart healthy foods and drinks to include:  Ask your dietitian or healthcare provider how many servings to have from each of the following food groups:  · Grains:      ¨ Whole-wheat breads, cereals, and pastas, and brown rice    ¨ Low-fat, low-sodium crackers and chips    · Vegetables:      ¨ Broccoli, green beans, green peas, and spinach    ¨ Collards, kale, and lima beans    ¨ Carrots, sweet potatoes, tomatoes, and peppers    ¨ Canned vegetables with no salt added    · Fruits:      ¨ Bananas, peaches, pears, and pineapple    ¨ Grapes, raisins, and dates    ¨ Oranges, tangerines, grapefruit, orange juice, and grapefruit juice    ¨ Apricots, mangoes, melons, and papaya    ¨ Raspberries and strawberries    ¨ Canned fruit with no added sugar    · Low-fat dairy products:      ¨ Nonfat (skim) milk, 1% milk, and low-fat almond, cashew, or soy milks fortified with calcium    ¨ Low-fat cheese, regular or frozen yogurt, and cottage cheese    · Meats and proteins , such as lean cuts of beef and pork (loin, leg, round), skinless chicken and turkey, legumes, soy products, egg whites, and nuts  Foods and drinks to limit or avoid:  Ask your dietitian or healthcare provider about these and other foods that are high in unhealthy fat, sodium, and sugar:  · Snack or packaged foods , such as frozen dinners, cookies, macaroni and cheese, and cereals with more than 300 mg of sodium per serving    · Canned or dry mixes  for cakes, soups, sauces, or gravies    · Vegetables with added sodium , such as instant potatoes, vegetables with added sauces, or regular canned vegetables    · Other foods high in sodium , such as ketchup, barbecue sauce, salad dressing, pickles, olives, soy sauce, and miso    · High-fat dairy foods  such as whole or 2% milk, cream cheese, or sour cream, and cheeses     · High-fat protein foods  such as high-fat cuts of beef (T-bone steaks, ribs), chicken or turkey with skin, and organ meats, such as liver    · Cured or smoked meats , such as hot dogs, graves, and sausage    · Unhealthy fats and oils , such as butter, stick margarine, shortening, and cooking oils such as coconut or palm oil    · Food and drinks high in sugar , such as soft drinks (soda), sports drinks, sweetened tea, candy, cake, cookies, pies, and doughnuts  Other diet guidelines to follow:   · Eat more foods containing omega-3 fats  Eat fish high in omega-3 fats at least 2 times a week  · Limit alcohol  Too much alcohol can damage your heart and raise your blood pressure  Women should limit alcohol to 1 drink a day  Men should limit alcohol to 2 drinks a day  A drink of alcohol is 12 ounces of beer, 5 ounces of wine, or 1½ ounces of liquor  · Choose low-sodium foods  High-sodium foods can lead to high blood pressure  Add little or no salt to food you prepare  Use herbs and spices in place of salt  · Eat more fiber  to help lower cholesterol levels   Eat at least 5 servings of fruits and vegetables each day  Eat 3 ounces of whole-grain foods each day  Legumes (beans) are also a good source of fiber  Lifestyle guidelines:   · Do not smoke  Nicotine and other chemicals in cigarettes and cigars can cause lung and heart damage  Ask your healthcare provider for information if you currently smoke and need help to quit  E-cigarettes or smokeless tobacco still contain nicotine  Talk to your healthcare provider before you use these products  · Exercise regularly  to help you maintain a healthy weight and improve your blood pressure and cholesterol levels  Ask your healthcare provider about the best exercise plan for you  Do not start an exercise program without asking your healthcare provider  Follow up with your healthcare provider as directed:  Write down your questions so you remember to ask them during your visits  © 2017 [address] Information is for End User's use only and may not be sold, redistributed or otherwise used for commercial purposes  All illustrations and images included in CareNotes® are the copyrighted property of [employer]  or Hugh Soni  The above information is an  only  It is not intended as medical advice for individual conditions or treatments  Talk to your doctor, nurse or pharmacist before following any medical regimen to see if it is safe and effective for you  Calorie Counting Diet   WHAT YOU NEED TO KNOW:   What is a calorie counting diet? It is a meal plan based on counting calories each day to reach a healthy body weight  You will need to eat fewer calories if you are trying to lose weight  Weight loss may decrease your risk for certain health problems or improve your health if you have health problems  Some of these health problems include heart disease, high blood pressure, and diabetes  What foods should I avoid?   Your dietitian will tell you if you need to avoid certain foods based on your body weight and health condition  You may need to avoid high-fat foods if you are at risk for or have heart disease  You may need to eat fewer foods from the breads and starches food group if you have diabetes  How many calories are in foods? The following is a list of foods and drinks with the approximate number of calories in each  Check the food label to find the exact number of calories  A dietitian can tell you how many calories you should have from each food group each day    · Carbohydrate:      ¨ ½ of a 3-inch bagel, 1 slice of bread, or ½ of a hamburger bun or hot dog bun (80)    ¨ 1 (8-inch) flour tortilla or ½ cup of cooked rice (100)    ¨ 1 (6-inch) corn tortilla (80)    ¨ 1 (6-inch) pancake or 1 cup of bran flakes cereal (110)    ¨ ½ cup of cooked cereal (80)    ¨ ½ cup of cooked pasta (85)    ¨ 1 ounce of pretzels (100)    ¨ 3 cups of air-popped popcorn without butter or oil (80)    · Dairy:      ¨ 1 cup of skim or 1% milk (90)    ¨ 1 cup of 2% milk (120)    ¨ 1 cup of whole milk (160)    ¨ 1 cup of 2% chocolate milk (220)    ¨ 1 ounce of low-fat cheese with 3 grams of fat per ounce (70)    ¨ 1 ounce of cheddar cheese (114)    ¨ ½ cup of 1% fat cottage cheese (80)    ¨ 1 cup of plain or sugar-free, fat-free yogurt (90)    · Protein foods:      ¨ 3 ounces of fish (not breaded or fried) (95)    ¨ 3 ounces of breaded, fried fish (195)    ¨ ¾ cup of tuna canned in water (105)    ¨ 3 ounces of chicken breast without skin (105)    ¨ 1 fried chicken breast with skin (350)    ¨ ¼ cup of fat free egg substitute (40)    ¨ 1 large egg (75)    ¨ 3 ounces of lean beef or pork (165)    ¨ 3 ounces of fried pork chop or ham (185)    ¨ ½ cup of cooked dried beans, such as kidney, richards, lentils, or navy (115)    ¨ 3 ounces of bologna or lunch meat (225)    ¨ 2 links of breakfast sausage (140)    · Vegetables:      ¨ ½ cup of sliced mushrooms (10)    ¨ 1 cup of salad greens, such as lettuce, spinach, or reese (15)    ¨ ½ cup of steamed asparagus (20)    ¨ ½ cup of cooked summer squash, zucchini squash, or green or wax beans (25)    ¨ 1 cup of broccoli or cauliflower florets, or 1 medium tomato (25)    ¨ 1 large raw carrot or ½ cup of cooked carrots (40)    ¨ ? of a medium cucumber or 1 stalk of celery (5)    ¨ 1 small baked potato (160)    ¨ 1 cup of breaded, fried vegetables (230)    · Fruit:      ¨ 1 (6-inch) banana (55)     ¨ ½ of a 4-inch grapefruit (55)    ¨ 15 grapes (60)    ¨ 1 medium orange or apple (70)    ¨ 1 large peach (65)    ¨ 1 cup of fresh pineapple chunks (75)    ¨ 1 cup of melon cubes (50)    ¨ 1¼ cups of whole strawberries (45)    ¨ ½ cup of fruit canned in juice (55)    ¨ ½ cup of fruit canned in heavy syrup (110)    ¨ ?  cup of raisins (130)    ¨ ½ cup of unsweetened fruit juice (60)    ¨ ½ cup of grape, cranberry, or prune juice (90)    · Fat:      ¨ 10 peanuts or 2 teaspoons of peanut butter (55)    ¨ 2 tablespoons of avocado or 1 tablespoon of regular salad dressing (45)    ¨ 2 slices of graves (90)    ¨ 1 teaspoon of oil, such as safflower, canola, corn, or olive oil (45)    ¨ 2 teaspoons of low-fat margarine, or 1 tablespoon of low-fat mayonnaise (50)    ¨ 1 teaspoon of regular margarine (40)    ¨ 1 tablespoon of regular mayonnaise (135)    ¨ 1 tablespoon of cream cheese or 2 tablespoons of low-fat cream cheese (45)    ¨ 2 tablespoons of vegetable shortening (215)    · Dessert and sweets:      ¨ 8 animal crackers or 5 vanilla wafers (80)    ¨ 1 frozen fruit juice bar (80)    ¨ ½ cup of ice milk or low-fat frozen yogurt (90)    ¨ ½ cup of sherbet or sorbet (125)    ¨ ½ cup of sugar-free pudding or custard (60)    ¨ ½ cup of ice cream (140)    ¨ ½ cup of pudding or custard (175)    ¨ 1 (2-inch) square chocolate brownie (185)    · Combination foods:      ¨ Bean burrito made with an 8-inch tortilla, without cheese (275)    ¨ Chicken breast sandwich with lettuce and tomato (325)    ¨ 1 cup of chicken noodle soup (60)    ¨ 1 beef taco (175)    ¨ Regular hamburger with lettuce and tomato (310)    ¨ Regular cheeseburger with lettuce and tomato (410)     ¨ ¼ of a 12-inch cheese pizza (280)    ¨ Fried fish sandwich with lettuce and tomato (425)    ¨ Hot dog and bun (275)    ¨ 1½ cups of macaroni and cheese (310)    ¨ Taco salad with a fried tortilla shell (870)    · Low-calorie foods:      ¨ 1 tablespoon of ketchup or 1 tablespoon of fat free sour cream (15)    ¨ 1 teaspoon of mustard (5)    ¨ ¼ cup of salsa (20)    ¨ 1 large dill pickle (15)    ¨ 1 tablespoon of fat free salad dressing (10)    ¨ 2 teaspoons of low-sugar, light jam or jelly, or 1 tablespoon of sugar-free syrup (15)    ¨ 1 sugar-free popsicle (15)    ¨ 1 cup of club soda, seltzer water, or diet soda (0)  CARE AGREEMENT:   You have the right to help plan your care  Discuss treatment options with your caregivers to decide what care you want to receive  You always have the right to refuse treatment  The above information is an  only  It is not intended as medical advice for individual conditions or treatments  Talk to your doctor, nurse or pharmacist before following any medical regimen to see if it is safe and effective for you  © 2017 [address] Information is for End User's use only and may not be sold, redistributed or otherwise used for commercial purposes  All illustrations and images included in CareNotes® are the copyrighted property of [employer]  or NITESH Lopez    Portions of the record may have been created with voice recognition software  Occasional wrong word or "sound a like" substitutions may have occurred due to the inherent limitations of voice recognition software  Read the chart carefully and recognize, using context, where substitutions have occurred  BMI Counseling: Body mass index is 29 94 kg/m²   The BMI is above normal  Nutrition recommendations include reducing portion sizes, decreasing overall calorie intake, 3-5 servings of fruits/vegetables daily, reducing fast food intake, consuming healthier snacks, decreasing soda and/or juice intake, moderation in carbohydrate intake, increasing intake of lean protein, reducing intake of saturated fat and trans fat and reducing intake of cholesterol  Exercise recommendations include moderate aerobic physical activity for 150 minutes/week

## 2020-07-15 ENCOUNTER — HOSPITAL ENCOUNTER (OUTPATIENT)
Dept: MAMMOGRAPHY | Facility: CLINIC | Age: 50
Discharge: HOME | End: 2020-07-15
Payer: COMMERCIAL

## 2020-07-15 VITALS — HEIGHT: 64 IN | WEIGHT: 168 LBS | BODY MASS INDEX: 28.68 KG/M2

## 2020-07-15 DIAGNOSIS — Z12.31 ENCOUNTER FOR SCREENING MAMMOGRAM FOR MALIGNANT NEOPLASM OF BREAST: ICD-10-CM

## 2020-07-15 PROCEDURE — 77063 BREAST TOMOSYNTHESIS BI: CPT

## 2020-07-15 PROCEDURE — 77067 SCR MAMMO BI INCL CAD: CPT

## 2020-07-23 ENCOUNTER — APPOINTMENT (OUTPATIENT)
Dept: LAB | Facility: CLINIC | Age: 50
End: 2020-07-23
Payer: COMMERCIAL

## 2020-07-23 DIAGNOSIS — Z20.822 CLOSE EXPOSURE TO COVID-19 VIRUS: ICD-10-CM

## 2020-07-23 DIAGNOSIS — Z11.4 SCREENING FOR HIV (HUMAN IMMUNODEFICIENCY VIRUS): ICD-10-CM

## 2020-07-23 DIAGNOSIS — E78.5 DYSLIPIDEMIA: ICD-10-CM

## 2020-07-23 LAB
ALBUMIN SERPL BCP-MCNC: 3.8 G/DL (ref 3.5–5)
ALP SERPL-CCNC: 77 U/L (ref 46–116)
ALT SERPL W P-5'-P-CCNC: 26 U/L (ref 12–78)
ANION GAP SERPL CALCULATED.3IONS-SCNC: 4 MMOL/L (ref 4–13)
AST SERPL W P-5'-P-CCNC: 17 U/L (ref 5–45)
BACTERIA UR QL AUTO: ABNORMAL /HPF
BILIRUB SERPL-MCNC: 0.26 MG/DL (ref 0.2–1)
BILIRUB UR QL STRIP: NEGATIVE
BUN SERPL-MCNC: 14 MG/DL (ref 5–25)
CALCIUM SERPL-MCNC: 9.5 MG/DL (ref 8.3–10.1)
CHLORIDE SERPL-SCNC: 108 MMOL/L (ref 100–108)
CHOLEST SERPL-MCNC: 195 MG/DL (ref 50–200)
CLARITY UR: CLEAR
CO2 SERPL-SCNC: 28 MMOL/L (ref 21–32)
COLOR UR: YELLOW
CREAT SERPL-MCNC: 1 MG/DL (ref 0.6–1.3)
CREAT UR-MCNC: 130 MG/DL
GFR SERPL CREATININE-BSD FRML MDRD: 67 ML/MIN/1.73SQ M
GLUCOSE P FAST SERPL-MCNC: 99 MG/DL (ref 65–99)
GLUCOSE UR STRIP-MCNC: NEGATIVE MG/DL
HDLC SERPL-MCNC: 69 MG/DL
HGB UR QL STRIP.AUTO: NEGATIVE
HYALINE CASTS #/AREA URNS LPF: ABNORMAL /LPF
KETONES UR STRIP-MCNC: NEGATIVE MG/DL
LDLC SERPL CALC-MCNC: 109 MG/DL (ref 0–100)
LEUKOCYTE ESTERASE UR QL STRIP: ABNORMAL
MICROALBUMIN UR-MCNC: 6.1 MG/L (ref 0–20)
MICROALBUMIN/CREAT 24H UR: 5 MG/G CREATININE (ref 0–30)
NITRITE UR QL STRIP: NEGATIVE
NON-SQ EPI CELLS URNS QL MICRO: ABNORMAL /HPF
NONHDLC SERPL-MCNC: 126 MG/DL
PH UR STRIP.AUTO: 7.5 [PH]
POTASSIUM SERPL-SCNC: 4.2 MMOL/L (ref 3.5–5.3)
PROT SERPL-MCNC: 6.9 G/DL (ref 6.4–8.2)
PROT UR STRIP-MCNC: NEGATIVE MG/DL
RBC #/AREA URNS AUTO: ABNORMAL /HPF
SARS-COV-2 IGG+IGM SERPL QL IA: NORMAL
SODIUM SERPL-SCNC: 140 MMOL/L (ref 136–145)
SP GR UR STRIP.AUTO: 1.02 (ref 1–1.03)
TRIGL SERPL-MCNC: 85 MG/DL
UROBILINOGEN UR QL STRIP.AUTO: 0.2 E.U./DL
WBC #/AREA URNS AUTO: ABNORMAL /HPF

## 2020-07-23 PROCEDURE — 36415 COLL VENOUS BLD VENIPUNCTURE: CPT

## 2020-07-23 PROCEDURE — 80061 LIPID PANEL: CPT

## 2020-07-23 PROCEDURE — 87389 HIV-1 AG W/HIV-1&-2 AB AG IA: CPT

## 2020-07-23 PROCEDURE — 82570 ASSAY OF URINE CREATININE: CPT | Performed by: FAMILY MEDICINE

## 2020-07-23 PROCEDURE — 86769 SARS-COV-2 COVID-19 ANTIBODY: CPT

## 2020-07-23 PROCEDURE — 82043 UR ALBUMIN QUANTITATIVE: CPT | Performed by: FAMILY MEDICINE

## 2020-07-23 PROCEDURE — 80053 COMPREHEN METABOLIC PANEL: CPT

## 2020-07-23 PROCEDURE — 81001 URINALYSIS AUTO W/SCOPE: CPT | Performed by: FAMILY MEDICINE

## 2020-07-24 LAB — HIV 1+2 AB+HIV1 P24 AG SERPL QL IA: NORMAL

## 2020-08-01 DIAGNOSIS — F41.9 ANXIETY: ICD-10-CM

## 2020-08-01 DIAGNOSIS — Z71.6 ENCOUNTER FOR SMOKING CESSATION COUNSELING: ICD-10-CM

## 2020-08-03 ENCOUNTER — OFFICE VISIT (OUTPATIENT)
Dept: FAMILY MEDICINE CLINIC | Facility: CLINIC | Age: 50
End: 2020-08-03
Payer: COMMERCIAL

## 2020-08-03 VITALS
TEMPERATURE: 96.8 F | RESPIRATION RATE: 14 BRPM | SYSTOLIC BLOOD PRESSURE: 102 MMHG | HEART RATE: 66 BPM | OXYGEN SATURATION: 97 % | HEIGHT: 64 IN | DIASTOLIC BLOOD PRESSURE: 80 MMHG | BODY MASS INDEX: 28.34 KG/M2 | WEIGHT: 166 LBS

## 2020-08-03 DIAGNOSIS — T50.905A DRUG-RELATED HAIR LOSS: ICD-10-CM

## 2020-08-03 DIAGNOSIS — R53.83 OTHER FATIGUE: ICD-10-CM

## 2020-08-03 DIAGNOSIS — F41.8 DEPRESSION WITH ANXIETY: Primary | ICD-10-CM

## 2020-08-03 DIAGNOSIS — E78.5 HYPERLIPIDEMIA, UNSPECIFIED HYPERLIPIDEMIA TYPE: ICD-10-CM

## 2020-08-03 DIAGNOSIS — Z71.6 ENCOUNTER FOR SMOKING CESSATION COUNSELING: ICD-10-CM

## 2020-08-03 DIAGNOSIS — L65.8 DRUG-RELATED HAIR LOSS: ICD-10-CM

## 2020-08-03 PROCEDURE — 1036F TOBACCO NON-USER: CPT | Performed by: FAMILY MEDICINE

## 2020-08-03 PROCEDURE — 3008F BODY MASS INDEX DOCD: CPT | Performed by: FAMILY MEDICINE

## 2020-08-03 PROCEDURE — 99214 OFFICE O/P EST MOD 30 MIN: CPT | Performed by: FAMILY MEDICINE

## 2020-08-03 RX ORDER — FLUOXETINE HYDROCHLORIDE 20 MG/1
20 CAPSULE ORAL DAILY
Qty: 30 CAPSULE | Refills: 0 | Status: SHIPPED | OUTPATIENT
Start: 2020-08-03 | End: 2020-08-03

## 2020-08-03 RX ORDER — BUPROPION HYDROCHLORIDE 150 MG/1
TABLET, EXTENDED RELEASE ORAL
Qty: 60 TABLET | Refills: 0 | OUTPATIENT
Start: 2020-08-03

## 2020-08-03 RX ORDER — FLUOXETINE HCL 20 MG
20 CAPSULE ORAL DAILY
Qty: 30 CAPSULE | Refills: 0 | Status: SHIPPED | OUTPATIENT
Start: 2020-08-03 | End: 2020-08-28

## 2020-08-03 NOTE — ASSESSMENT & PLAN NOTE
Will switch to fluoxetine now and discontinue the wellbutrin in 3 days  Will follow here in 4 weeks

## 2020-08-03 NOTE — PROGRESS NOTES
Assessment/Plan:     Chronic Problems:  Drug-related hair loss  Although I do not feel her hair loss is from Wellbutrin she does  Will stop the Wellbutrin by taking 1 Wellbutrin daily for the next 3 days  Today start the fluoxetine 20 mg  Hyperlipidemia   Discussed all with patient  She would prefer to come off her Lipitor  I agree that she is very young to be on this medication  Will discontinue this medication and recheck the cholesterol in 8 weeks  Depression with anxiety  Will switch to fluoxetine now and discontinue the wellbutrin in 3 days  Will follow here in 4 weeks  Visit Diagnosis:  Diagnoses and all orders for this visit:    Depression with anxiety  -     Discontinue: FLUoxetine (PROzac) 20 mg capsule; Take 1 capsule (20 mg total) by mouth daily  -     PROzac 20 MG capsule; Take 1 capsule (20 mg total) by mouth daily    Drug-related hair loss    Hyperlipidemia, unspecified hyperlipidemia type  -     Lipid panel; Future  -     Comprehensive metabolic panel; Future    Encounter for smoking cessation counseling  Comments:  Pt has stopped smoking  Encouraged to use nicorette gum if needed  Other fatigue  Comments: Will get tsh now  Orders:  -     TSH, 3rd generation with Free T4 reflex; Future          Subjective:    Patient ID: Luciana Linton is a 54 y o  female  Pt is here for her wellbutrin but she thinks it is making her hair fall out  Feels her hair is coming out when she brushes her hair and it is on the pillow  Was able to stop smoking after about 1 week  Has not stopped the wellbutrin yet, but she is feeling good, just concerned that her hair is falling out  Pt wants to come off the med  Feels there is no question in her mind that the wellbutrin is causing hair loss  Pt feels she is feeling better on this med, and did not feel despair all day  Was feeling self hatred and not now  Dead against meds, but now knows it will help  Takes all other meds as directed   No side effects noted  Had labs done and here to discuss results  Both mom and dad have heart disease  Pt was put on lipitor by her prior doc and would like to come off lipitor  Willing to have a blood test off meds  The following portions of the patient's history were reviewed and updated as appropriate: allergies, current medications, past family history, past medical history, past social history, past surgical history and problem list     Review of Systems   Constitutional: Positive for fatigue  Negative for chills, diaphoresis and fever  HENT: Negative  Eyes: Negative  Respiratory: Negative for cough, shortness of breath and wheezing  Cardiovascular: Negative for chest pain and palpitations  Gastrointestinal: Negative  Musculoskeletal: Negative for arthralgias and myalgias  Skin:        + hair loss   Neurological: Negative for dizziness, light-headedness and headaches  Psychiatric/Behavioral: Negative for dysphoric mood  The patient is not nervous/anxious  Feels good on meds  Not awakening depressed            /80   Pulse 66   Temp (!) 96 8 °F (36 °C)   Resp 14   Ht 5' 4"   Wt 75 3 kg (166 lb)   LMP 2014 (Within Days) Comment: Medical induced menopsuse   SpO2 97%   BMI 28 49 kg/m²   Social History     Socioeconomic History    Marital status: /Civil Union     Spouse name: Not on file    Number of children: Not on file    Years of education: Not on file    Highest education level: Not on file   Occupational History    Occupation: EMPLOYED   Social Needs    Financial resource strain: Not on file    Food insecurity     Worry: Not on file     Inability: Not on file    Transportation needs     Medical: Not on file     Non-medical: Not on file   Tobacco Use    Smoking status: Former Smoker     Last attempt to quit:      Years since quittin 5    Smokeless tobacco: Never Used   Substance and Sexual Activity    Alcohol use: Yes     Frequency: Monthly or less  Drug use: No    Sexual activity: Not Currently     Birth control/protection: Post-menopausal   Lifestyle    Physical activity     Days per week: Not on file     Minutes per session: Not on file    Stress: Not on file   Relationships    Social connections     Talks on phone: Not on file     Gets together: Not on file     Attends Restorationism service: Not on file     Active member of club or organization: Not on file     Attends meetings of clubs or organizations: Not on file     Relationship status: Not on file    Intimate partner violence     Fear of current or ex partner: Not on file     Emotionally abused: Not on file     Physically abused: Not on file     Forced sexual activity: Not on file   Other Topics Concern    Not on file   Social History Narrative    Not on file     Past Medical History:   Diagnosis Date    Cancer [facility])     Colon cancer ([facility] [address] ) 2014    History of chemotherapy 2014    colon ca    History of radiation therapy 2014    colon ca    History of tear of meniscus of knee joint     Hyperlipidemia     Tennis elbow      Family History   Problem Relation Age of Onset    Diabetes Mother     Heart failure Mother     Heart defect Father         CARDIAC PACEMAKER    Hip fracture Father         HIP REPLACEMENT    Breast cancer Sister 52    BRCA1 Negative Sister     BRCA2 Negative Sister     Lung cancer Maternal Grandmother     Breast cancer Maternal Aunt 46        again at 72    Colon cancer Maternal Aunt     No Known Problems Paternal Aunt     No Known Problems Paternal Aunt     No Known Problems Paternal Aunt     No Known Problems Paternal Aunt     Ovarian cancer Neg Hx     Uterine cancer Neg Hx     Cervical cancer Neg Hx     Endometrial cancer Neg Hx      Past Surgical History:   Procedure Laterality Date    BREAST SURGERY Bilateral     RECONSTRUCION WITH IMPLANT PROTHESIS     COLON SURGERY      ELBOW SURGERY Left 2015    Tennis elbow    KNEE SURGERY      MENISCECTOMY Left 06/2016       Current Outpatient Medications:     buPROPion [facility]) 150 mg 12 hr tablet, Take 1 pill daily for 3 days then bid  (Patient not taking: Reported on 8/3/2020), Disp: 60 tablet, Rfl: 0    PROzac 20 MG capsule, Take 1 capsule (20 mg total) by mouth daily, Disp: 30 capsule, Rfl: 0    Allergies   Allergen Reactions    Iodinated Diagnostic Agents           Lab Review   Appointment on 07/23/2020   Component Date Value    Sodium 07/23/2020 140     Potassium 07/23/2020 4 2     Chloride 07/23/2020 108     CO2 07/23/2020 28     ANION GAP 07/23/2020 4     BUN 07/23/2020 14     Creatinine 07/23/2020 1 00     Glucose, Fasting 07/23/2020 99     Calcium 07/23/2020 9 5     AST 07/23/2020 17     ALT 07/23/2020 26     Alkaline Phosphatase 07/23/2020 77     Total Protein 07/23/2020 6 9     Albumin 07/23/2020 3 8     Total Bilirubin 07/23/2020 0 26     eGFR 07/23/2020 67     Cholesterol 07/23/2020 195     Triglycerides 07/23/2020 85     HDL, Direct 07/23/2020 69     LDL Calculated 07/23/2020 109*    Non-HDL-Chol (CHOL-HDL) 07/23/2020 126     HIV-1/HIV-2 Ab 07/23/2020 Non-Reactive     SARS-CoV-2 Ab, Total (Ig* 07/23/2020 Non-Reactive    Office Visit on 07/08/2020   Component Date Value    Creatinine, Ur 07/23/2020 130 0     Microalbum  ,U,Random 07/23/2020 6 1     Microalb Creat Ratio 07/23/2020 5     Clarity, UA 07/23/2020 Clear     Color, UA 07/23/2020 Yellow     Specific Gravity, UA 07/23/2020 1 019     pH, UA 07/23/2020 7 5     Glucose, UA 07/23/2020 Negative     Ketones, UA 07/23/2020 Negative     Blood, UA 07/23/2020 Negative     Protein, UA 07/23/2020 Negative     Nitrite, UA 07/23/2020 Negative     Bilirubin, UA 07/23/2020 Negative     Urobilinogen, UA 07/23/2020 0 2     Leukocytes, UA 07/23/2020 Trace*    WBC, UA 07/23/2020 None Seen     RBC, UA 07/23/2020 None Seen     Hyaline Casts, UA 07/23/2020 None Seen     Bacteria, UA 07/23/2020 None Seen     Epithelial Cells 07/23/2020 None Seen         Imaging: Mammo Screening Bilateral W 3d & Cad    Result Date: 7/17/2020  Narrative: DIAGNOSIS: Encounter for screening mammogram for malignant neoplasm of breast TECHNIQUE: Digital screening mammography was performed  Computer Aided Detection (CAD) analyzed all applicable images  COMPARISONS: Prior breast imaging dated: 07/03/2019, 05/03/2018, 04/06/2017, 04/05/2016, and 03/13/2015 RELEVANT HISTORY: Family Breast Cancer History: History of breast cancer in Sister, Maternal Aunt  Family Medical History: Family medical history includes BRCA1 Negative in sister, BRCA2 Negative in sister, breast cancer in maternal aunt, breast cancer in sister, and colon cancer in maternal aunt  Personal History: No known relevant hormone history  No known relevant surgical history  Medical history includes colon cancer and history of chemotherapy  The patient is scheduled in a reminder system for screening mammography  8-10% of cancers will be missed on mammography  Management of a palpable abnormality must be based on clinical grounds  Patients will be notified of their results via letter from our facility  Accredited by [employer] of Radiology and FDA  RISK ASSESSMENT: 5 Year Tyrer-Cuzick: 2 82 % 10 Year Tyrer-Cuzick: 6 05 % Lifetime Tyrer-Cuzick: 28 01 % TISSUE DENSITY: The breasts are extremely dense, which lowers the sensitivity of mammography  INDICATION: Padmini Khan is a 54 y o  female presenting for screening mammography  FINDINGS: Bilateral There are no suspicious masses, grouped microcalcifications or areas of architectural distortion  The skin and nipple areolar complex are unremarkable  Bilateral subpectoral saline implants are present and unchanged with normal contours  Impression: No mammographic evidence of malignancy  This patient has been identified as being at elevated risk for development of breast cancer based on the Tyrer-Cuzick model   She may benefit from supplemental screening  ASSESSMENT/BI-RADS CATEGORY: Left: 2 - Benign Right: 2 - Benign Overall: 2 - Benign RECOMMENDATION:      - Routine screening mammogram in 1 year for both breasts  Workstation ID: [id number]       Objective:     Physical Exam   Constitutional: She is oriented to person, place, and time  She appears well-developed  No distress  HENT:   Head: Normocephalic and atraumatic  Right Ear: Tympanic membrane and external ear normal    Left Ear: Tympanic membrane and external ear normal    Nose: Nose normal    Mouth/Throat: Mucous membranes are moist  Oropharynx is clear  Eyes: Pupils are equal, round, and reactive to light  Conjunctivae are normal  Right eye exhibits no discharge  Left eye exhibits no discharge  Neck: Normal range of motion  Neck supple  No thyromegaly present  Cardiovascular: Normal rate, regular rhythm and normal heart sounds  Exam reveals no friction rub  No murmur heard  Pulmonary/Chest: Effort normal and breath sounds normal  No respiratory distress  She has no wheezes  She has no rales  She exhibits no tenderness  Abdominal: Soft  Bowel sounds are normal  There is no abdominal tenderness  Musculoskeletal: Normal range of motion  General: No tenderness or deformity  Lymphadenopathy:     She has no cervical adenopathy  Neurological: She is alert and oriented to person, place, and time  No cranial nerve deficit  Skin: Skin is warm and dry  No rash noted  She is not diaphoretic  Psychiatric: Her behavior is normal  Judgment and thought content normal          Patient Instructions     Discussed all with patient  Since she believes the Wellbutrin is causing her hair loss I would suggest to take 1 Wellbutrin daily for the next 3 days but today start fluoxetine 20 mg  In 3 days  The Wellbutrin and stay on the fluoxetine    Okay to stop the Lipitor and we will recheck in about 8 weeks fasting blood test   No more cigarettes ever if you feel you need to cigarette Nicorette gum  Follow-up here in 4 weeks call if you have any problems  Have only the tsh done now  I will try to order NITESH Zhu    Portions of the record may have been created with voice recognition software  Occasional wrong word or "sound a like" substitutions may have occurred due to the inherent limitations of voice recognition software  Read the chart carefully and recognize, using context, where substitutions have occurred

## 2020-08-03 NOTE — ASSESSMENT & PLAN NOTE
Although I do not feel her hair loss is from Wellbutrin she does  Will stop the Wellbutrin by taking 1 Wellbutrin daily for the next 3 days  Today start the fluoxetine 20 mg

## 2020-08-03 NOTE — ASSESSMENT & PLAN NOTE
Discussed all with patient  She would prefer to come off her Lipitor  I agree that she is very young to be on this medication  Will discontinue this medication and recheck the cholesterol in 8 weeks

## 2020-08-03 NOTE — PATIENT INSTRUCTIONS
Discussed all with patient  Since she believes the Wellbutrin is causing her hair loss I would suggest to take 1 Wellbutrin daily for the next 3 days but today start fluoxetine 20 mg  In 3 days  The Wellbutrin and stay on the fluoxetine  Okay to stop the Lipitor and we will recheck in about 8 weeks fasting blood test   No more cigarettes ever if you feel you need to cigarette Nicorette gum  Follow-up here in 4 weeks call if you have any problems  Have only the tsh done now  I will try to order prozac madhuri

## 2020-08-14 ENCOUNTER — APPOINTMENT (OUTPATIENT)
Dept: LAB | Facility: CLINIC | Age: 50
End: 2020-08-14
Payer: COMMERCIAL

## 2020-08-14 DIAGNOSIS — R53.83 OTHER FATIGUE: ICD-10-CM

## 2020-08-14 LAB — TSH SERPL DL<=0.05 MIU/L-ACNC: 2.03 UIU/ML (ref 0.36–3.74)

## 2020-08-14 PROCEDURE — 36415 COLL VENOUS BLD VENIPUNCTURE: CPT

## 2020-08-14 PROCEDURE — 84443 ASSAY THYROID STIM HORMONE: CPT

## 2020-08-28 DIAGNOSIS — F41.8 DEPRESSION WITH ANXIETY: ICD-10-CM

## 2020-08-28 RX ORDER — FLUOXETINE HYDROCHLORIDE 20 MG/1
CAPSULE ORAL
Qty: 30 CAPSULE | Refills: 3 | Status: SHIPPED | OUTPATIENT
Start: 2020-08-28 | End: 2020-09-22 | Stop reason: ALTCHOICE

## 2020-09-22 ENCOUNTER — TELEMEDICINE (OUTPATIENT)
Dept: FAMILY MEDICINE CLINIC | Facility: CLINIC | Age: 50
End: 2020-09-22
Payer: COMMERCIAL

## 2020-09-22 DIAGNOSIS — L65.8 DRUG-RELATED HAIR LOSS: ICD-10-CM

## 2020-09-22 DIAGNOSIS — T50.905A DRUG-RELATED HAIR LOSS: ICD-10-CM

## 2020-09-22 DIAGNOSIS — F41.8 DEPRESSION WITH ANXIETY: Primary | ICD-10-CM

## 2020-09-22 DIAGNOSIS — G47.00 INSOMNIA, UNSPECIFIED TYPE: ICD-10-CM

## 2020-09-22 DIAGNOSIS — E78.5 HYPERLIPIDEMIA, UNSPECIFIED HYPERLIPIDEMIA TYPE: ICD-10-CM

## 2020-09-22 PROCEDURE — 99214 OFFICE O/P EST MOD 30 MIN: CPT | Performed by: FAMILY MEDICINE

## 2020-09-22 RX ORDER — TRAZODONE HYDROCHLORIDE 50 MG/1
TABLET ORAL
Qty: 30 TABLET | Refills: 0 | Status: SHIPPED | OUTPATIENT
Start: 2020-09-22 | End: 2020-10-16

## 2020-09-22 RX ORDER — BUPROPION HYDROCHLORIDE 150 MG/1
150 TABLET ORAL EVERY MORNING
Qty: 30 TABLET | Refills: 1 | Status: SHIPPED | OUTPATIENT
Start: 2020-09-22 | End: 2020-10-06 | Stop reason: SDUPTHER

## 2020-09-22 NOTE — PATIENT INSTRUCTIONS
Discussed all with patient  Will restart Wellbutrin as she continues to lose hair and now is not sure if this is what caused the hair loss  Will stop the fluoxetine as she is not done well on this medication and restart the Wellbutrin  Use the trazodone for sleep start with a half a pill may increase to 1 pill nightly  Expect the 1st few nights to have some sedation however if you are still not sleeping the whole night you can increase the pill to 1 daily  Keep the follow-up appointment in October have the labs done 1st   We will consider a follow-up with dermatology for the hair loss

## 2020-09-29 ENCOUNTER — APPOINTMENT (OUTPATIENT)
Dept: LAB | Facility: CLINIC | Age: 50
End: 2020-09-29
Payer: COMMERCIAL

## 2020-09-29 DIAGNOSIS — E78.5 HYPERLIPIDEMIA, UNSPECIFIED HYPERLIPIDEMIA TYPE: ICD-10-CM

## 2020-09-29 LAB
ALBUMIN SERPL BCP-MCNC: 3.9 G/DL (ref 3.5–5)
ALP SERPL-CCNC: 75 U/L (ref 46–116)
ALT SERPL W P-5'-P-CCNC: 30 U/L (ref 12–78)
ANION GAP SERPL CALCULATED.3IONS-SCNC: 2 MMOL/L (ref 4–13)
AST SERPL W P-5'-P-CCNC: 20 U/L (ref 5–45)
BILIRUB SERPL-MCNC: 0.27 MG/DL (ref 0.2–1)
BUN SERPL-MCNC: 15 MG/DL (ref 5–25)
CALCIUM SERPL-MCNC: 9.3 MG/DL (ref 8.3–10.1)
CHLORIDE SERPL-SCNC: 109 MMOL/L (ref 100–108)
CHOLEST SERPL-MCNC: 302 MG/DL (ref 50–200)
CO2 SERPL-SCNC: 30 MMOL/L (ref 21–32)
CREAT SERPL-MCNC: 1.16 MG/DL (ref 0.6–1.3)
GFR SERPL CREATININE-BSD FRML MDRD: 56 ML/MIN/1.73SQ M
GLUCOSE P FAST SERPL-MCNC: 93 MG/DL (ref 65–99)
HDLC SERPL-MCNC: 68 MG/DL
LDLC SERPL CALC-MCNC: 213 MG/DL (ref 0–100)
NONHDLC SERPL-MCNC: 234 MG/DL
POTASSIUM SERPL-SCNC: 4.3 MMOL/L (ref 3.5–5.3)
PROT SERPL-MCNC: 7.2 G/DL (ref 6.4–8.2)
SODIUM SERPL-SCNC: 141 MMOL/L (ref 136–145)
TRIGL SERPL-MCNC: 106 MG/DL

## 2020-09-29 PROCEDURE — 36415 COLL VENOUS BLD VENIPUNCTURE: CPT

## 2020-09-29 PROCEDURE — 80053 COMPREHEN METABOLIC PANEL: CPT

## 2020-09-29 PROCEDURE — 80061 LIPID PANEL: CPT

## 2020-10-06 ENCOUNTER — TELEMEDICINE (OUTPATIENT)
Dept: FAMILY MEDICINE CLINIC | Facility: CLINIC | Age: 50
End: 2020-10-06
Payer: COMMERCIAL

## 2020-10-06 VITALS — HEART RATE: 113 BPM | OXYGEN SATURATION: 94 % | TEMPERATURE: 98.9 F

## 2020-10-06 DIAGNOSIS — E78.5 HYPERLIPIDEMIA, UNSPECIFIED HYPERLIPIDEMIA TYPE: ICD-10-CM

## 2020-10-06 DIAGNOSIS — N18.31 STAGE 3A CHRONIC KIDNEY DISEASE (HCC): ICD-10-CM

## 2020-10-06 DIAGNOSIS — F41.8 DEPRESSION WITH ANXIETY: Primary | ICD-10-CM

## 2020-10-06 PROCEDURE — 99214 OFFICE O/P EST MOD 30 MIN: CPT | Performed by: FAMILY MEDICINE

## 2020-10-06 RX ORDER — BUPROPION HYDROCHLORIDE 150 MG/1
150 TABLET ORAL EVERY MORNING
Qty: 30 TABLET | Refills: 3 | Status: SHIPPED | OUTPATIENT
Start: 2020-10-06 | End: 2021-01-07 | Stop reason: DRUGHIGH

## 2020-10-06 RX ORDER — ROSUVASTATIN CALCIUM 10 MG/1
10 TABLET, COATED ORAL DAILY
Qty: 30 TABLET | Refills: 1 | Status: SHIPPED | OUTPATIENT
Start: 2020-10-06 | End: 2020-12-14

## 2020-10-16 DIAGNOSIS — G47.00 INSOMNIA, UNSPECIFIED TYPE: ICD-10-CM

## 2020-10-16 RX ORDER — TRAZODONE HYDROCHLORIDE 50 MG/1
TABLET ORAL
Qty: 30 TABLET | Refills: 0 | Status: SHIPPED | OUTPATIENT
Start: 2020-10-16 | End: 2021-01-07 | Stop reason: ALTCHOICE

## 2020-12-13 DIAGNOSIS — E78.5 HYPERLIPIDEMIA, UNSPECIFIED HYPERLIPIDEMIA TYPE: ICD-10-CM

## 2020-12-14 ENCOUNTER — LAB (OUTPATIENT)
Dept: LAB | Facility: CLINIC | Age: 50
End: 2020-12-14
Payer: COMMERCIAL

## 2020-12-14 DIAGNOSIS — E78.5 HYPERLIPIDEMIA, UNSPECIFIED HYPERLIPIDEMIA TYPE: ICD-10-CM

## 2020-12-14 LAB
ALBUMIN SERPL BCP-MCNC: 3.6 G/DL (ref 3.5–5)
ALP SERPL-CCNC: 69 U/L (ref 46–116)
ALT SERPL W P-5'-P-CCNC: 28 U/L (ref 12–78)
ANION GAP SERPL CALCULATED.3IONS-SCNC: 6 MMOL/L (ref 4–13)
AST SERPL W P-5'-P-CCNC: 19 U/L (ref 5–45)
BILIRUB SERPL-MCNC: 0.17 MG/DL (ref 0.2–1)
BUN SERPL-MCNC: 17 MG/DL (ref 5–25)
CALCIUM SERPL-MCNC: 8.8 MG/DL (ref 8.3–10.1)
CHLORIDE SERPL-SCNC: 112 MMOL/L (ref 100–108)
CHOLEST SERPL-MCNC: 192 MG/DL (ref 50–200)
CO2 SERPL-SCNC: 25 MMOL/L (ref 21–32)
CREAT SERPL-MCNC: 0.83 MG/DL (ref 0.6–1.3)
GFR SERPL CREATININE-BSD FRML MDRD: 84 ML/MIN/1.73SQ M
GLUCOSE P FAST SERPL-MCNC: 93 MG/DL (ref 65–99)
HDLC SERPL-MCNC: 69 MG/DL
LDLC SERPL CALC-MCNC: 105 MG/DL (ref 0–100)
NONHDLC SERPL-MCNC: 123 MG/DL
POTASSIUM SERPL-SCNC: 4.2 MMOL/L (ref 3.5–5.3)
PROT SERPL-MCNC: 6.7 G/DL (ref 6.4–8.2)
SODIUM SERPL-SCNC: 143 MMOL/L (ref 136–145)
TRIGL SERPL-MCNC: 88 MG/DL

## 2020-12-14 PROCEDURE — 36415 COLL VENOUS BLD VENIPUNCTURE: CPT

## 2020-12-14 PROCEDURE — 80061 LIPID PANEL: CPT

## 2020-12-14 PROCEDURE — 80053 COMPREHEN METABOLIC PANEL: CPT

## 2020-12-14 RX ORDER — ROSUVASTATIN CALCIUM 10 MG/1
TABLET, COATED ORAL
Qty: 30 TABLET | Refills: 1 | Status: SHIPPED | OUTPATIENT
Start: 2020-12-14 | End: 2021-01-07 | Stop reason: SDUPTHER

## 2020-12-18 ENCOUNTER — HOSPITAL ENCOUNTER (OUTPATIENT)
Dept: ULTRASOUND IMAGING | Facility: CLINIC | Age: 50
Discharge: HOME | End: 2020-12-18
Payer: COMMERCIAL

## 2020-12-18 DIAGNOSIS — R92.2 DENSE BREAST TISSUE: ICD-10-CM

## 2020-12-18 DIAGNOSIS — Z12.39 OTHER SCREENING BREAST EXAMINATION: ICD-10-CM

## 2020-12-18 PROCEDURE — 76641 ULTRASOUND BREAST COMPLETE: CPT

## 2020-12-18 PROCEDURE — 76377 3D RENDER W/INTRP POSTPROCES: CPT

## 2021-01-07 ENCOUNTER — OFFICE VISIT (OUTPATIENT)
Dept: FAMILY MEDICINE CLINIC | Facility: CLINIC | Age: 51
End: 2021-01-07
Payer: COMMERCIAL

## 2021-01-07 VITALS
WEIGHT: 165 LBS | BODY MASS INDEX: 28.17 KG/M2 | DIASTOLIC BLOOD PRESSURE: 60 MMHG | RESPIRATION RATE: 12 BRPM | OXYGEN SATURATION: 99 % | HEART RATE: 84 BPM | HEIGHT: 64 IN | TEMPERATURE: 97 F | SYSTOLIC BLOOD PRESSURE: 100 MMHG

## 2021-01-07 DIAGNOSIS — F41.9 ANXIETY: Primary | ICD-10-CM

## 2021-01-07 DIAGNOSIS — C18.9 MALIGNANT NEOPLASM OF COLON, UNSPECIFIED PART OF COLON (HCC): ICD-10-CM

## 2021-01-07 DIAGNOSIS — E78.5 HYPERLIPIDEMIA, UNSPECIFIED HYPERLIPIDEMIA TYPE: ICD-10-CM

## 2021-01-07 PROCEDURE — 99214 OFFICE O/P EST MOD 30 MIN: CPT | Performed by: FAMILY MEDICINE

## 2021-01-07 RX ORDER — BUPROPION HYDROCHLORIDE 150 MG/1
150 TABLET, EXTENDED RELEASE ORAL 2 TIMES DAILY
Qty: 60 TABLET | Refills: 1 | Status: SHIPPED | OUTPATIENT
Start: 2021-01-07 | End: 2021-02-22

## 2021-01-07 RX ORDER — ROSUVASTATIN CALCIUM 10 MG/1
10 TABLET, COATED ORAL DAILY
Qty: 90 TABLET | Refills: 1 | Status: SHIPPED | OUTPATIENT
Start: 2021-01-07 | End: 2021-07-30

## 2021-01-07 NOTE — ASSESSMENT & PLAN NOTE
Us with patient  She definitely notices a difference on the Wellbutrin once daily  Will increase the Wellbutrin to twice daily  If she has any problems on this medicine just give us a call we can always back down to 150 once daily

## 2021-01-07 NOTE — PROGRESS NOTES
Assessment/Plan:     Chronic Problems:  Malignant neoplasm of colon ([address] )  Still follows with Edwin Nath for her colon cancer  Anxiety  Us with patient  She definitely notices a difference on the Wellbutrin once daily  Will increase the Wellbutrin to twice daily  If she has any problems on this medicine just give us a call we can always back down to 150 once daily  Difficulty controlling anger    Patient and  both notice a difference with her anger on this medication  Will increase to twice a day as per her request       Visit Diagnosis:  Diagnoses and all orders for this visit:    Anxiety    Hyperlipidemia, unspecified hyperlipidemia type  -     buPROPion (WELLBUTRIN SR) 150 mg 12 hr tablet; Take 1 tablet (150 mg total) by mouth 2 (two) times a day  -     rosuvastatin (CRESTOR) 10 MG tablet; Take 1 tablet (10 mg total) by mouth daily    Malignant neoplasm of colon, unspecified part of colon (HCC)          Subjective:    Patient ID: Rodrigue Mitchell is a 52 y o  female  Pt is here for routine f/u appt  Feels she is doing much better on the current meds but wonders if she would do better on twice daily meds  Thinks she may need to increase to 150 mg twice daily  Knows she is not as angry and less anxious on the wellbutrin  Still has moments but they are not as bad  Feels her job is frustrating, but this helps  Not taking trazodone for sleep as she is sleeping better now  Takes all other meds as directed  No side effects noted  The following portions of the patient's history were reviewed and updated as appropriate: allergies, current medications, past family history, past medical history, past social history, past surgical history and problem list     Review of Systems   Constitutional: Negative for chills, diaphoresis, fatigue and fever  HENT: Negative  Eyes: Negative  Respiratory: Negative for cough, shortness of breath and wheezing      Cardiovascular: Negative for chest pain and palpitations  Gastrointestinal: Negative  Genitourinary: Negative  Menopause since  from chemo and radiation  Neurological: Negative for dizziness, light-headedness and headaches  Psychiatric/Behavioral: Negative for dysphoric mood  The patient is not nervous/anxious            /60   Pulse 84   Temp (!) 97 °F (36 1 °C)   Resp 12   Ht 5' 4" (1 626 m)   Wt 74 8 kg (165 lb)   LMP 2014 (Within Days) Comment: Medical induced menopsuse   SpO2 99%   BMI 28 32 kg/m²   Social History     Socioeconomic History    Marital status: /Civil Union     Spouse name: Not on file    Number of children: Not on file    Years of education: Not on file    Highest education level: Not on file   Occupational History    Occupation: EMPLOYED   Social Needs    Financial resource strain: Not on file    Food insecurity     Worry: Not on file     Inability: Not on file    Transportation needs     Medical: Not on file     Non-medical: Not on file   Tobacco Use    Smoking status: Former Smoker     Quit date:      Years since quittin 0    Smokeless tobacco: Never Used   Substance and Sexual Activity    Alcohol use: Yes     Frequency: Monthly or less    Drug use: No    Sexual activity: Not Currently     Birth control/protection: Post-menopausal   Lifestyle    Physical activity     Days per week: Not on file     Minutes per session: Not on file    Stress: Not on file   Relationships    Social connections     Talks on phone: Not on file     Gets together: Not on file     Attends [employer] service: Not on file     Active member of club or organization: Not on file     Attends meetings of clubs or organizations: Not on file     Relationship status: Not on file    Intimate partner violence     Fear of current or ex partner: Not on file     Emotionally abused: Not on file     Physically abused: Not on file     Forced sexual activity: Not on file   Other Topics Concern    Not on file Social History Narrative    Not on file     Past Medical History:   Diagnosis Date    Cancer [facility])     History of chemotherapy 2014    colon ca    History of radiation therapy 2014    colon ca    History of rectal cancer 2014    Locally advanced     History of tear of meniscus of knee joint     Hyperlipidemia     Tennis elbow      Family History   Problem Relation Age of Onset    Diabetes Mother     Heart failure Mother     Heart defect Father         CARDIAC PACEMAKER    Hip fracture Father         HIP REPLACEMENT    Breast cancer Sister 52    BRCA1 Negative Sister     BRCA2 Negative Sister     Lung cancer Maternal Grandmother     Breast cancer Maternal Aunt 46        again at 72    Colon cancer Maternal Aunt     No Known Problems Paternal Aunt     No Known Problems Paternal Aunt     No Known Problems Paternal Aunt     No Known Problems Paternal Aunt     Ovarian cancer Neg Hx     Uterine cancer Neg Hx     Cervical cancer Neg Hx     Endometrial cancer Neg Hx      Past Surgical History:   Procedure Laterality Date    BREAST SURGERY Bilateral     RECONSTRUCION WITH IMPLANT PROTHESIS     COLON SURGERY      ELBOW SURGERY Left 2015    Tennis elbow    KNEE SURGERY      MENISCECTOMY Left 06/2016       Current Outpatient Medications:     rosuvastatin (CRESTOR) 10 MG tablet, Take 1 tablet (10 mg total) by mouth daily, Disp: 90 tablet, Rfl: 1    buPROPion (WELLBUTRIN SR) 150 mg 12 hr tablet, Take 1 tablet (150 mg total) by mouth 2 (two) times a day, Disp: 60 tablet, Rfl: 1    Allergies   Allergen Reactions    Iodinated Diagnostic Agents           Lab Review   Lab on 12/14/2020   Component Date Value    Sodium 12/14/2020 143     Potassium 12/14/2020 4 2     Chloride 12/14/2020 112*    CO2 12/14/2020 25     ANION GAP 12/14/2020 6     BUN 12/14/2020 17     Creatinine 12/14/2020 0 83     Glucose, Fasting 12/14/2020 93     Calcium 12/14/2020 8 8     AST 12/14/2020 19     ALT 12/14/2020 28     Alkaline Phosphatase 12/14/2020 69     Total Protein 12/14/2020 6 7     Albumin 12/14/2020 3 6     Total Bilirubin 12/14/2020 0 17*    eGFR 12/14/2020 84     Cholesterol 12/14/2020 192     Triglycerides 12/14/2020 88     HDL, Direct 12/14/2020 69     LDL Calculated 12/14/2020 105*    Non-HDL-Chol (CHOL-HDL) 12/14/2020 123         Imaging: Us Breast Screening Bilateral Complete (abus)    Result Date: 12/18/2020  Narrative: DIAGNOSIS: Dense breast tissue; Other screening breast examination TECHNIQUE: Automated breast ultrasound images were obtained on the [employer] system  Imaging was obtained in standard projections including AP, lateral and medial for both breasts, inclusive of all four quadrants  COMPARISONS: Prior breast imaging dated: 07/15/2020, 07/03/2019, and 05/03/2018 RELEVANT HISTORY: Family Breast Cancer History: History of breast cancer in Sister, Maternal Aunt  Family Medical History: Family medical history includes BRCA1 Negative in sister, BRCA2 Negative in sister, breast cancer in 2 relatives (maternal aunt, sister), and colon cancer in maternal aunt  Personal History: No known relevant hormone history  No known relevant surgical history  Medical history includes history of chemotherapy  RISK ASSESSMENT: 5 Year Tyrer-Cuzick: 2 8 % 10 Year Tyrer-Cuzick: 6 01 % Lifetime Tyrer-Cuzick: 27 84 % TISSUE COMPOSITION: Homogeneous background echotexture - fat INDICATION: Mesha Burdick is a 54 y o  female with dense breasts presenting for automated breast ultrasound screening  FINDINGS: Bilateral There are no suspicious masses or areas of architectural distortion  Retropectoral implants noted  Impression:  No evidence for malignancy  Automated breast ultrasound is an adjunct, not a replacement, for routine yearly screening mammography   ASSESSMENT/BI-RADS CATEGORY: Left: 2 - Benign Right: 2 - Benign Overall: 2 - Benign RECOMMENDATION:      - Routine screening mammogram in 1 year for both breasts  Workstation ID: [id number]      Objective:     Physical Exam  Vitals signs and nursing note reviewed  Constitutional:       General: She is not in acute distress  Appearance: Normal appearance  She is well-developed  She is not ill-appearing, toxic-appearing or diaphoretic  HENT:      Head: Normocephalic and atraumatic  Right Ear: Tympanic membrane, ear canal and external ear normal       Left Ear: Tympanic membrane, ear canal and external ear normal       Nose: Nose normal  No rhinorrhea  Eyes:      General:         Right eye: No discharge  Left eye: No discharge  Conjunctiva/sclera: Conjunctivae normal       Pupils: Pupils are equal, round, and reactive to light  Neck:      Musculoskeletal: Normal range of motion and neck supple  Thyroid: No thyromegaly  Cardiovascular:      Rate and Rhythm: Normal rate and regular rhythm  Heart sounds: Normal heart sounds  No murmur  No friction rub  Pulmonary:      Effort: Pulmonary effort is normal  No respiratory distress  Breath sounds: Normal breath sounds  No wheezing or rales  Abdominal:      General: Bowel sounds are normal       Palpations: Abdomen is soft  Tenderness: There is no abdominal tenderness  Musculoskeletal: Normal range of motion  General: No tenderness or deformity  Lymphadenopathy:      Cervical: No cervical adenopathy  Skin:     General: Skin is warm and dry  Findings: No rash  Neurological:      Mental Status: She is alert and oriented to person, place, and time  Cranial Nerves: No cranial nerve deficit  Psychiatric:         Behavior: Behavior normal          Thought Content: Thought content normal          Judgment: Judgment normal            Patient Instructions     Reviewed all with patient  She is doing much better on the Wellbutrin but will increase to twice daily    Call here if any problem on this medication we could always go back to the once daily at 150 mg  Labs are good and at goal   Kidney function is great  Keep your follow-up with [place]  Follow-up here in 4 months  You will need a Tdap shot prior to your 1st grandchild being born  We will give you that at your follow-up appointment  NITESH Renae    Portions of the record may have been created with voice recognition software  Occasional wrong word or "sound a like" substitutions may have occurred due to the inherent limitations of voice recognition software  Read the chart carefully and recognize, using context, where substitutions have occurred

## 2021-01-07 NOTE — PATIENT INSTRUCTIONS
Reviewed all with patient  She is doing much better on the Wellbutrin but will increase to twice daily  Call here if any problem on this medication we could always go back to the once daily at 150 mg  Labs are good and at goal   Kidney function is great  Keep your follow-up with [place]  Follow-up here in 4 months  You will need a Tdap shot prior to your 1st grandchild being born  We will give you that at your follow-up appointment

## 2021-01-07 NOTE — ASSESSMENT & PLAN NOTE
Patient and  both notice a difference with her anger on this medication    Will increase to twice a day as per her request

## 2021-02-22 DIAGNOSIS — E78.5 HYPERLIPIDEMIA, UNSPECIFIED HYPERLIPIDEMIA TYPE: ICD-10-CM

## 2021-02-22 RX ORDER — BUPROPION HYDROCHLORIDE 150 MG/1
TABLET, EXTENDED RELEASE ORAL
Qty: 60 TABLET | Refills: 1 | Status: SHIPPED | OUTPATIENT
Start: 2021-02-22 | End: 2021-04-13

## 2021-03-18 ENCOUNTER — IMMUNIZATIONS (OUTPATIENT)
Dept: FAMILY MEDICINE CLINIC | Facility: HOSPITAL | Age: 51
End: 2021-03-18

## 2021-03-18 DIAGNOSIS — Z23 ENCOUNTER FOR IMMUNIZATION: Primary | ICD-10-CM

## 2021-03-18 PROCEDURE — 91300 SARS-COV-2 / COVID-19 MRNA VACCINE (PFIZER-BIONTECH) 30 MCG: CPT

## 2021-03-18 PROCEDURE — 0001A SARS-COV-2 / COVID-19 MRNA VACCINE (PFIZER-BIONTECH) 30 MCG: CPT

## 2021-04-11 ENCOUNTER — IMMUNIZATIONS (OUTPATIENT)
Dept: FAMILY MEDICINE CLINIC | Facility: HOSPITAL | Age: 51
End: 2021-04-11

## 2021-04-11 DIAGNOSIS — Z23 ENCOUNTER FOR IMMUNIZATION: Primary | ICD-10-CM

## 2021-04-11 PROCEDURE — 91300 SARS-COV-2 / COVID-19 MRNA VACCINE (PFIZER-BIONTECH) 30 MCG: CPT

## 2021-04-11 PROCEDURE — 0002A SARS-COV-2 / COVID-19 MRNA VACCINE (PFIZER-BIONTECH) 30 MCG: CPT

## 2021-04-12 DIAGNOSIS — E78.5 HYPERLIPIDEMIA, UNSPECIFIED HYPERLIPIDEMIA TYPE: ICD-10-CM

## 2021-04-13 RX ORDER — BUPROPION HYDROCHLORIDE 150 MG/1
TABLET, EXTENDED RELEASE ORAL
Qty: 60 TABLET | Refills: 1 | Status: SHIPPED | OUTPATIENT
Start: 2021-04-13 | End: 2021-06-07

## 2021-05-17 ENCOUNTER — OFFICE VISIT (OUTPATIENT)
Dept: VASCULAR SURGERY | Facility: CLINIC | Age: 51
End: 2021-05-17
Payer: COMMERCIAL

## 2021-05-17 VITALS
SYSTOLIC BLOOD PRESSURE: 110 MMHG | BODY MASS INDEX: 27.66 KG/M2 | HEIGHT: 64 IN | DIASTOLIC BLOOD PRESSURE: 78 MMHG | WEIGHT: 162 LBS | HEART RATE: 78 BPM | TEMPERATURE: 98.7 F

## 2021-05-17 DIAGNOSIS — I83.812 VARICOSE VEINS OF LEFT LOWER EXTREMITY WITH PAIN: Primary | ICD-10-CM

## 2021-05-17 PROCEDURE — 99214 OFFICE O/P EST MOD 30 MIN: CPT | Performed by: PHYSICIAN ASSISTANT

## 2021-05-17 NOTE — PATIENT INSTRUCTIONS
Varicose veins of left lower extremity with pain  -     VAS reflux lower limb venous duplex study with reflux assesment, unilateral; Future              Varicose Veins, Ambulatory Care   GENERAL INFORMATION:   Varicose veins  are veins that become large, twisted, and swollen  They are common on the back of your calves, knees, and thighs  Common symptoms include the following:   · Blue, purple, or bulging veins in your legs     · Pain, swelling, or muscle cramps in your legs    · Feeling of heaviness in your legs  Seek immediate care for the following symptoms:   · A wound that does not heal or is infected    · An injury that has broken your skin and caused your varicose veins to bleed    · Swollen and hard leg    · Pain in your leg that does not go away or gets worse    · Legs or feet turn blue or black    · Warmth, tenderness, and pain in your leg (may also look swollen and red)  Treatment of varicose veins  aims to decrease symptoms, improve appearance, and prevent further problems  Treatment will depend on which veins are affected and how severe your condition is  Prescription pain medicine may be given  Ask how to take this medicine safely  Procedures may be done to remove your varicose veins  Your healthcare provider may inject a solution or use a laser to close the varicose veins  Surgery to remove long veins may also be done  Ask your healthcare provider for more information about procedures used in treating varicose veins  Manage varicose veins:   · Wear pressure stockings  The stockings are tight and put pressure on your legs  They improve blood flow and help prevent clots  · Elevate  your legs above the level of your heart for 15 to 30 minutes several times a day  This will help blood to flow back to your heart  · Avoid sitting or standing for long periods of time  This can cause the blood to collect in your legs and make your symptoms worse   Walk around for a few minutes every hour to get blood moving in your legs  · Avoid wearing tight clothing and shoes  Avoid wearing high-heeled shoes  Do not wear clothes that are tight around the waist     · Get plenty of exercise  Talk to your healthcare provider about the best exercise plan for you  Exercise can decrease your blood pressure and improve your health  Bend or rotate your ankles several times every hour  This will help blood to flow back to the heart  · Maintain a healthy weight  Your heart works harder when you are overweight and this can make varicose vein worse  Ask how much you should weigh  Ask him to help you create a weight loss plan if you are overweight  · Do not smoke  If you smoke, it is never too late to quit  Ask for information if you need help quitting  Follow up with your healthcare provider as directed:  Write down your questions so you remember to ask them during your visits  CARE AGREEMENT:   You have the right to help plan your care  Learn about your health condition and how it may be treated  Discuss treatment options with your caregivers to decide what care you want to receive  You always have the right to refuse treatment  The above information is an  only  It is not intended as medical advice for individual conditions or treatments  Talk to your doctor, nurse or pharmacist before following any medical regimen to see if it is safe and effective for you  © 2014 [address] is for End User's use only and may not be sold, redistributed or otherwise used for commercial purposes  All illustrations and images included in CareNotes® are the copyrighted property of [employer]  or Hugh Soni

## 2021-05-17 NOTE — PROGRESS NOTES
Assessment/Plan:    Varicose veins of left lower extremity with pain  -     VAS reflux lower limb venous duplex study with reflux assesment, unilateral; Future    -continued L leg throbbing pain, heavy, and cramping; L lateral leg shooting pain  -no significant edema  -sx "all the time"   -mild improvement with compression stockings  -sx and exam not typical for arterial disease  -some sx consistent with venous disease  -continue work up by checking venous reflux study and f/u VS    Subjective:      Patient ID: Han January is a 52 y o  female  Patient is here to al L leg pain  Patient reports she can only walk less than 1 block  Patient states she's in severe pain that keeps her up all night  Patient also c/o numbness and tingling  Patient presents Varicose veins and does wear compression  HPI    Han January 53 y/o F colon KV 6922 W/X chemo/rxn, HLD, L hip pain/L bursitis, Hx L knee meniscectomy  X 2  Wild Graves comes in for follow up of symptomatic varicose veins with symptoms worse on LEFT  Left leg pain may be somewhat mixed from musculoskeletal and vein disease  She reports that symptoms began after diagnosis of colon cancer   She c/o LEFT lower extremity pain from the distal thigh down the leg  The legs feel the best in the morning but gets tired throughout the day   She complains of LEFT leg tired, achy and crampy legs   She also has some numbness and shooting pains at the lateral knee   She reports that she has seen other doctors and went to therapy without relief in symptoms  5/17/21: Wild Graves returns for re-evaluation of leg pain  She feels she might need a balloon to help the left leg  She comes into the office tearful that she she still has L lateral leg pain  She says that the pain is so severe that she is dragging her leg  She reports that she is wearing compression which partially helps her sx but the leg is aching and throbbing all day- sitting and after walking   She also has lateral leg shooting pain  She can't walk well so she bikes to get exercise but the legs cramp later on in the day  She has had 2 knee surgeries which didn't help her R leg so she is hoping that we can help her in vascular  She has knee swelling but no LE edema  Few varicosities  No large bulky veins  Skin is healthy and intact  No hx of back pain or injuries  She is having a grandchild and would like to walk again        -Wearing compression stockings with mild, inadequate relief  -Tearful in the office due to ongoing sx  -Bike tolerable but increased cramps afterward  -2 prior knee surgeries which didn't help leg pain ('18)        The following portions of the patient's history were reviewed and updated as appropriate: allergies, current medications, past family history, past medical history, past social history, past surgical history and problem list     Review of Systems   Constitutional: Negative  HENT: Negative  Eyes: Negative  Respiratory: Negative  Cardiovascular: Positive for leg swelling (LLE)  Gastrointestinal: Negative  Endocrine: Negative  Genitourinary: Negative  Musculoskeletal:        LLE pain   Skin: Negative  Allergic/Immunologic: Negative  Neurological: Negative  Hematological: Negative  Psychiatric/Behavioral: Negative  Objective:      /78 (BP Location: Right arm, Patient Position: Sitting, Cuff Size: Standard)   Pulse 78   Temp 98 7 °F (37 1 °C) (Tympanic)   Ht 5' 4" (1 626 m)   Wt 73 5 kg (162 lb)   LMP 07/01/2014 (Within Days) Comment: Medical induced menopsuse   BMI 27 81 kg/m²       Few vv over legs  2+ femoral and DP pulses  DP/PT signals bilaterally  feet are warm and well perfursed       Physical Exam  Vitals signs and nursing note reviewed  Constitutional:       Appearance: She is well-developed  HENT:      Head: Normocephalic and atraumatic  Eyes:      Pupils: Pupils are equal, round, and reactive to light     Neck:      Musculoskeletal: Neck supple  Thyroid: No thyromegaly  Vascular: No JVD  Trachea: Trachea normal    Cardiovascular:      Rate and Rhythm: Normal rate and regular rhythm  Pulses:           Carotid pulses are 2+ on the right side and 2+ on the left side  Radial pulses are 2+ on the right side and 2+ on the left side  Femoral pulses are 2+ on the right side and 2+ on the left side  Dorsalis pedis pulses are 2+ on the right side and 2+ on the left side  Heart sounds: Normal heart sounds, S1 normal and S2 normal  No murmur  No friction rub  No gallop  Pulmonary:      Effort: Pulmonary effort is normal  No accessory muscle usage or respiratory distress  Breath sounds: Normal breath sounds  No wheezing or rales  Abdominal:      General: Bowel sounds are normal  There is no distension  Palpations: Abdomen is soft  Tenderness: There is no abdominal tenderness  Musculoskeletal: Normal range of motion  General: No deformity  Skin:     General: Skin is warm and dry  Findings: No lesion or rash  Nails: There is no clubbing  Neurological:      Mental Status: She is alert and oriented to person, place, and time  Comments: Grossly normal    Psychiatric:         Behavior: Behavior is cooperative  I have reviewed and made appropriate changes to the review of systems input by the medical assistant      Vitals:    05/17/21 1503   BP: 110/78   BP Location: Right arm   Patient Position: Sitting   Cuff Size: Standard   Pulse: 78   Temp: 98 7 °F (37 1 °C)   TempSrc: Tympanic   Weight: 73 5 kg (162 lb)   Height: 5' 4" (1 626 m)       Patient Active Problem List   Diagnosis    Malignant neoplasm of colon ([address] )    Early menopause occurring in patient age younger than 39 years   [facility] Hyperlipidemia    Left hip pain    Tear of meniscus of left knee    Trochanteric bursitis of left hip    Varicose veins of left lower extremity with pain    Difficulty controlling anger    Anxiety    Postmenopausal bleeding    Pharyngitis    History of colon cancer    Asymptomatic postmenopausal status    Dense breast tissue    Encounter for gynecological examination without abnormal finding    Drug-related hair loss    Depression with anxiety    Stage 3a chronic kidney disease (HCC)       Past Surgical History:   Procedure Laterality Date    BREAST SURGERY Bilateral     RECONSTRUCION WITH IMPLANT PROTHESIS     COLON SURGERY      ELBOW SURGERY Left 2015    Tennis elbow    KNEE SURGERY      MENISCECTOMY Left 2016       Family History   Problem Relation Age of Onset    Diabetes Mother     Heart failure Mother     Heart defect Father         CARDIAC PACEMAKER    Hip fracture Father         HIP REPLACEMENT    Breast cancer Sister 52    BRCA1 Negative Sister     BRCA2 Negative Sister     Lung cancer Maternal Grandmother     Breast cancer Maternal Aunt 46        again at 72    Colon cancer Maternal Aunt     No Known Problems Paternal Aunt     No Known Problems Paternal Aunt     No Known Problems Paternal Aunt     No Known Problems Paternal Aunt     Ovarian cancer Neg Hx     Uterine cancer Neg Hx     Cervical cancer Neg Hx     Endometrial cancer Neg Hx        Social History     Socioeconomic History    Marital status: /Civil Union     Spouse name: Not on file    Number of children: Not on file    Years of education: Not on file    Highest education level: Not on file   Occupational History    Occupation: EMPLOYED   Social Needs    Financial resource strain: Not on file    Food insecurity     Worry: Not on file     Inability: Not on file    Transportation needs     Medical: Not on file     Non-medical: Not on file   Tobacco Use    Smoking status: Former Smoker     Quit date:      Years since quittin 3    Smokeless tobacco: Never Used   Substance and Sexual Activity    Alcohol use: Yes     Frequency: Monthly or less    Drug use: No    Sexual activity: Not Currently     Birth control/protection: Post-menopausal   Lifestyle    Physical activity     Days per week: Not on file     Minutes per session: Not on file    Stress: Not on file   Relationships    Social connections     Talks on phone: Not on file     Gets together: Not on file     Attends [employer] service: Not on file     Active member of club or organization: Not on file     Attends meetings of clubs or organizations: Not on file     Relationship status: Not on file    Intimate partner violence     Fear of current or ex partner: Not on file     Emotionally abused: Not on file     Physically abused: Not on file     Forced sexual activity: Not on file   Other Topics Concern    Not on file   Social History Narrative    Not on file       Allergies   Allergen Reactions    Iodinated Diagnostic Agents          Current Outpatient Medications:     buPROPion (WELLBUTRIN SR) 150 mg 12 hr tablet, TAKE 1 TABLET BY MOUTH TWICE A DAY, Disp: 60 tablet, Rfl: 1    rosuvastatin (CRESTOR) 10 MG tablet, Take 1 tablet (10 mg total) by mouth daily, Disp: 90 tablet, Rfl: 1

## 2021-05-26 ENCOUNTER — HOSPITAL ENCOUNTER (OUTPATIENT)
Dept: NON INVASIVE DIAGNOSTICS | Facility: CLINIC | Age: 51
Discharge: HOME | End: 2021-05-26
Payer: COMMERCIAL

## 2021-05-26 DIAGNOSIS — I83.812 VARICOSE VEINS OF LEFT LOWER EXTREMITY WITH PAIN: ICD-10-CM

## 2021-05-26 PROCEDURE — 93971 EXTREMITY STUDY: CPT | Performed by: SURGERY

## 2021-05-26 PROCEDURE — 93971 EXTREMITY STUDY: CPT

## 2021-06-07 ENCOUNTER — CLINICAL SUPPORT (OUTPATIENT)
Dept: FAMILY MEDICINE CLINIC | Facility: CLINIC | Age: 51
End: 2021-06-07
Payer: COMMERCIAL

## 2021-06-07 DIAGNOSIS — E78.5 HYPERLIPIDEMIA, UNSPECIFIED HYPERLIPIDEMIA TYPE: ICD-10-CM

## 2021-06-07 DIAGNOSIS — Z23 NEED FOR TETANUS BOOSTER: Primary | ICD-10-CM

## 2021-06-07 PROCEDURE — 90471 IMMUNIZATION ADMIN: CPT

## 2021-06-07 PROCEDURE — 90715 TDAP VACCINE 7 YRS/> IM: CPT

## 2021-06-07 RX ORDER — BUPROPION HYDROCHLORIDE 150 MG/1
TABLET, EXTENDED RELEASE ORAL
Qty: 60 TABLET | Refills: 1 | Status: SHIPPED | OUTPATIENT
Start: 2021-06-07 | End: 2021-08-19

## 2021-06-09 ENCOUNTER — OFFICE VISIT (OUTPATIENT)
Dept: VASCULAR SURGERY | Facility: CLINIC | Age: 51
End: 2021-06-09
Payer: COMMERCIAL

## 2021-06-09 ENCOUNTER — TELEPHONE (OUTPATIENT)
Dept: VASCULAR SURGERY | Facility: CLINIC | Age: 51
End: 2021-06-09

## 2021-06-09 VITALS
WEIGHT: 159 LBS | DIASTOLIC BLOOD PRESSURE: 76 MMHG | TEMPERATURE: 98.8 F | BODY MASS INDEX: 27.14 KG/M2 | HEART RATE: 82 BPM | HEIGHT: 64 IN | SYSTOLIC BLOOD PRESSURE: 104 MMHG

## 2021-06-09 DIAGNOSIS — M79.605 LOWER EXTREMITY PAIN, LEFT: Primary | ICD-10-CM

## 2021-06-09 DIAGNOSIS — I83.812 VARICOSE VEINS OF LEFT LOWER EXTREMITY WITH PAIN: ICD-10-CM

## 2021-06-09 PROCEDURE — 99215 OFFICE O/P EST HI 40 MIN: CPT | Performed by: SURGERY

## 2021-06-09 NOTE — TELEPHONE ENCOUNTER
Call in at your convenience; No appointment needed  Agency: [employer]: [phone] - (opt 3, enter [id number], opt 1)  Member ID: [id number]  Pending Case/ Reference Number: [id number]  Date of Service: 06/15/21  Ordering Provider: Terrall Eisenmenger (NPI: [id number]) -  Please Note:   Dr Lola Barboza is the ordering provider  However, the portal only allows request to be entered under Dr Loredo [address]  Servicing Facility: [facility] (Tax: [id number] / NPI: [id number])  DX: Lower extremity pain, left (ICD: M79 605)  Procedure: CT lower extremity wo contrast left (CPT: 78765)      Denial reason:  Based on the clinical information provided, this request has not demonstrated consistency with evidence-based clinical guidelines

## 2021-06-09 NOTE — PROGRESS NOTES
Assessment/Plan:    Lower extremity pain, left  48yo female with Left lower extremity pain for 7 years of unclear etiology  Underwent venous reflux study and presents to discuss results  Patient describes focal pain in the posterior distal thigh which then radiates down to to her left calf  She is asymptomatic on the right  She has pain/numbness with activity and at rest at nighttime  On exam she has 2+ palpable pulses throughout  She has some scattered telangiectasias of the left lower but no significant stigmata of chronic venous stasis  Her LEVDR does not demonstrate any significant reflux  I do not believe there is a vascular etiology to her pain  It is also not consistent with neurogenic claudication or radiculopathy however cannot completely exclude it  Will obtain non-con CT-scan (contrast allergy) of the LLE to evaluate for any other underlying pathology  If not revealing can consider MRI for further evaluation  Will notify patient of CT-scan results  PRN vascular f/u               Problem List Items Addressed This Visit        Other    Lower extremity pain, left - Primary     48yo female with Left lower extremity pain for 7 years of unclear etiology  Underwent venous reflux study and presents to discuss results  Patient describes focal pain in the posterior distal thigh which then radiates down to to her left calf  She is asymptomatic on the right  She has pain/numbness with activity and at rest at nighttime  On exam she has 2+ palpable pulses throughout  She has some scattered telangiectasias of the left lower but no significant stigmata of chronic venous stasis  Her LEVDR does not demonstrate any significant reflux  I do not believe there is a vascular etiology to her pain  It is also not consistent with neurogenic claudication or radiculopathy however cannot completely exclude it   Will obtain non-con CT-scan (contrast allergy) of the LLE to evaluate for any other underlying pathology  If not revealing can consider MRI for further evaluation  Will notify patient of CT-scan results  PRN vascular f/u               Relevant Orders    CT lower extremity wo contrast left            Subjective:      Patient ID: Jemma Burrell is a 52 y o  female  Patient w/ symptomatic VV of LLE presents today to review reflux study  Patient reports experiencing tenderness, pain and discomfort  Patient also experiences significant swelling around L knee which interferes with walking  Wearing compression daily  The following portions of the patient's history were reviewed and updated as appropriate: allergies, current medications, past family history, past medical history, past social history, past surgical history and problem list     Review of Systems   Constitutional: Negative  HENT: Negative  Eyes: Negative  Respiratory: Negative  Cardiovascular: Positive for leg swelling  Painful veins   Gastrointestinal: Negative  Endocrine: Negative  Genitourinary: Negative  Musculoskeletal: Positive for gait problem and joint swelling  Leg pain   Skin: Negative  Allergic/Immunologic: Negative  Hematological: Negative  Psychiatric/Behavioral: Negative  I have reviewed and updated the ROS as appropriate  Objective:      /76 (BP Location: Right arm, Patient Position: Sitting)   Pulse 82   Temp 98 8 °F (37 1 °C) (Tympanic)   Ht 5' 4" (1 626 m)   Wt 72 1 kg (159 lb)   LMP 07/01/2014 (Within Days) Comment: Medical induced menopsuse   BMI 27 29 kg/m²          Physical Exam  Constitutional:       Appearance: Normal appearance  HENT:      Head: Normocephalic and atraumatic  Right Ear: Tympanic membrane normal       Left Ear: Tympanic membrane normal       Nose: Nose normal       Mouth/Throat:      Mouth: Mucous membranes are dry  Eyes:      Extraocular Movements: Extraocular movements intact        Pupils: Pupils are equal, round, and reactive to light  Neck:      Musculoskeletal: Normal range of motion and neck supple  Cardiovascular:      Rate and Rhythm: Normal rate and regular rhythm  Pulses: Normal pulses  Heart sounds: Normal heart sounds  Pulmonary:      Effort: Pulmonary effort is normal       Breath sounds: Normal breath sounds  Abdominal:      General: Abdomen is flat  Palpations: Abdomen is soft  Musculoskeletal: Normal range of motion  General: Tenderness present  No swelling  Skin:     General: Skin is warm and dry  Capillary Refill: Capillary refill takes less than 2 seconds  Neurological:      General: No focal deficit present  Mental Status: She is alert  Mental status is at baseline  Motor: Weakness present  Psychiatric:         Mood and Affect: Mood normal          Behavior: Behavior normal          Thought Content:  Thought content normal          Judgment: Judgment normal

## 2021-06-09 NOTE — ASSESSMENT & PLAN NOTE
50yo female with Left lower extremity pain for 7 years of unclear etiology  Underwent venous reflux study and presents to discuss results  Patient describes focal pain in the posterior distal thigh which then radiates down to to her left calf  She is asymptomatic on the right  She has pain/numbness with activity and at rest at nighttime  On exam she has 2+ palpable pulses throughout  She has some scattered telangiectasias of the left lower but no significant stigmata of chronic venous stasis  Her LEVDR does not demonstrate any significant reflux  I do not believe there is a vascular etiology to her pain  It is also not consistent with neurogenic claudication or radiculopathy however cannot completely exclude it  Will obtain non-con CT-scan (contrast allergy) of the LLE to evaluate for any other underlying pathology  If not revealing can consider MRI for further evaluation       Will notify patient of CT-scan results  PRN vascular f/u

## 2021-06-15 ENCOUNTER — HOSPITAL ENCOUNTER (OUTPATIENT)
Dept: CT IMAGING | Facility: CLINIC | Age: 51
Discharge: HOME | End: 2021-06-15
Payer: COMMERCIAL

## 2021-06-15 DIAGNOSIS — M79.605 LOWER EXTREMITY PAIN, LEFT: ICD-10-CM

## 2021-06-15 PROCEDURE — 73700 CT LOWER EXTREMITY W/O DYE: CPT

## 2021-07-30 DIAGNOSIS — E78.5 HYPERLIPIDEMIA, UNSPECIFIED HYPERLIPIDEMIA TYPE: ICD-10-CM

## 2021-07-30 RX ORDER — ROSUVASTATIN CALCIUM 10 MG/1
TABLET, COATED ORAL
Qty: 30 TABLET | Refills: 1 | Status: SHIPPED | OUTPATIENT
Start: 2021-07-30 | End: 2021-09-27

## 2021-08-02 ENCOUNTER — ANNUAL EXAM (OUTPATIENT)
Dept: OBGYN CLINIC | Facility: CLINIC | Age: 51
End: 2021-08-02
Payer: COMMERCIAL

## 2021-08-02 VITALS
WEIGHT: 160 LBS | DIASTOLIC BLOOD PRESSURE: 78 MMHG | HEIGHT: 64 IN | BODY MASS INDEX: 27.31 KG/M2 | SYSTOLIC BLOOD PRESSURE: 116 MMHG

## 2021-08-02 DIAGNOSIS — Z12.31 ENCOUNTER FOR SCREENING MAMMOGRAM FOR MALIGNANT NEOPLASM OF BREAST: ICD-10-CM

## 2021-08-02 DIAGNOSIS — Z01.419 ENCOUNTER FOR GYNECOLOGICAL EXAMINATION WITHOUT ABNORMAL FINDING: Primary | ICD-10-CM

## 2021-08-02 DIAGNOSIS — Z80.3 FAMILY HISTORY OF BREAST CANCER IN FIRST DEGREE RELATIVE: ICD-10-CM

## 2021-08-02 DIAGNOSIS — Z78.0 ASYMPTOMATIC POSTMENOPAUSAL STATUS: ICD-10-CM

## 2021-08-02 PROBLEM — N95.0 POSTMENOPAUSAL BLEEDING: Status: RESOLVED | Noted: 2019-09-18 | Resolved: 2021-08-02

## 2021-08-02 PROCEDURE — 99396 PREV VISIT EST AGE 40-64: CPT | Performed by: NURSE PRACTITIONER

## 2021-08-02 NOTE — PATIENT INSTRUCTIONS
Breast Self Exam for Women   AMBULATORY CARE:   A breast self-exam (BSE)  is a way to check your breasts for lumps and other changes  Regular BSEs can help you know how your breasts normally look and feel  Most breast lumps or changes are not cancer, but you should always have them checked by a healthcare provider  Why you should do a BSE:  Breast cancer is the most common type of cancer in women  Even if you have mammograms, you may still want to do a BSE regularly  If you know how your breasts normally feel and look, it may help you know when to contact your healthcare provider  Mammograms can miss some cancers  You may find a lump during a BSE that did not show up on a mammogram   When you should do a BSE:  If you have periods, you may want to do your BSE 1 week after your period ends  This is the time when your breasts may be the least swollen, lumpy, or tender  You can do regular BSEs even if you are breastfeeding or have breast implants  Call your doctor if:   · You find any lumps or changes in your breasts  · You have breast pain or fluid coming from your nipples  · You have questions or concerns about your condition or care  How to do a BSE:       · Look at your breasts in a mirror  Look at the size and shape of each breast and nipple  Check for swelling, lumps, dimpling, scaly skin, or other skin changes  Look for nipple changes, such as a nipple that is painful or beginning to pull inward  Gently squeeze both nipples and check to see if fluid (that is not breast milk) comes out of them  If you find any of these or other breast changes, contact your healthcare provider  Check your breasts while you sit or  the following 3 positions:    ? Hang your arms down at your sides  ? Raise your hands and join them behind your head  ? Put firm pressure with your hands on your hips  Bend slightly forward while you look at your breasts in the mirror  · Lie down and feel your breasts    When you lie down, your breast tissue spreads out evenly over your chest  This makes it easier for you to feel for lumps and anything that may not be normal for your breasts  Do a BSE on one breast at a time  ? Place a small pillow or towel under your left shoulder  Put your left arm behind your head  ? Use the 3 middle fingers of your right hand  Use your fingertip pads, on the top of your fingers  Your fingertip pad is the most sensitive part of your finger  ? Use small circles to feel your breast tissue  Use your fingertip pads to make dime-sized, overlapping circles on your breast and armpits  Use light, medium, and firm pressure  First, press lightly  Second, press with medium pressure to feel a little deeper into the breast  Last, use firm pressure to feel deep within your breast     ? Examine your entire breast area  Examine the breast area from above the breast to below the breast where you feel only ribs  Make small circles with your fingertips, starting in the middle of your armpit  Make circles going up and down the breast area  Continue toward your breast and all the way across it  Examine the area from your armpit all the way over to the middle of your chest (breastbone)  Stop at the middle of your chest     ? Move the pillow or towel to your right shoulder, and put your right arm behind your head  Use the 3 fingertip pads of your left hand, and repeat the above steps to do a BSE on your right breast   What else you can do to check for breast problems or cancer:  Talk to your healthcare provider about mammograms  A mammogram is an x-ray of your breasts to screen for breast cancer or other problems  Your provider can tell you the benefits and risks of mammograms  The first mammogram is usually at age 39 or 48  Your provider may recommend you start at 36 or younger if your risk for breast cancer is high  Mammograms usually continue every 1 to 2 years until age 76       Follow up with your doctor as directed:  Write down your questions so you remember to ask them during your visits  © Copyright [employer] 2021 Information is for End User's use only and may not be sold, redistributed or otherwise used for commercial purposes  All illustrations and images included in CareNotes® are the copyrighted property of [employer]  or Facundo Prakash  The above information is an  only  It is not intended as medical advice for individual conditions or treatments  Talk to your doctor, nurse or pharmacist before following any medical regimen to see if it is safe and effective for you

## 2021-08-02 NOTE — PROGRESS NOTES
Diagnoses and all orders for this visit:    Encounter for gynecological examination without abnormal finding    Asymptomatic postmenopausal status    Family history of breast cancer in first degree relative  -     US breast screening bilateral complete (ABUS); Future    Encounter for screening mammogram for malignant neoplasm of breast  -     Mammo screening bilateral w 3d & cad; Future  -     US breast screening bilateral complete (ABUS); Future        Call as needed, encouraged calcium/vit D in her diet, call with any PMB, all questions answered      Pleasant 52 y o  postmenopausal female here for annual exam  She denies any further postmenopausal bleeding  EMB nondiagnostic but u/s nml in   LMP 2014 Medically induced menopause (Chemo/radiation)     Pap  Neg pap Neg HPV   Mammo , + fam hx with mat aunts and her sister  Requests ABUS which she does as 6 mos f/u after her mammogram without any insurance issues  History of Colon Cancer Followed by [facility]  +history of abnormal pap smears, no pap today  Denies vaginal issues  Denies pelvic pain  Denies postmenopausal issues  Sexually active but rarely       Past Medical History:   Diagnosis Date    Cancer [facility])     History of chemotherapy     colon ca    History of radiation therapy 2014    colon ca    History of rectal cancer 2014    Locally advanced     History of tear of meniscus of knee joint     Hyperlipidemia     Tennis elbow      Past Surgical History:   Procedure Laterality Date    BREAST SURGERY Bilateral     RECONSTRUCION WITH IMPLANT PROTHESIS     COLON SURGERY      ELBOW SURGERY Left 2015    Tennis elbow    KNEE SURGERY      MENISCECTOMY Left 2016     Family History   Problem Relation Age of Onset    Diabetes Mother     Heart failure Mother     Heart defect Father         CARDIAC PACEMAKER    Hip fracture Father         HIP REPLACEMENT    Breast cancer Sister 52    BRCA1 Negative Sister     BRCA2 Negative Sister     Lung cancer Maternal Grandmother     Breast cancer Maternal Aunt 46        again at 72    Colon cancer Maternal Aunt     No Known Problems Paternal Aunt     No Known Problems Paternal Aunt     No Known Problems Paternal Aunt     No Known Problems Paternal Aunt     Ovarian cancer Neg Hx     Uterine cancer Neg Hx     Cervical cancer Neg Hx     Endometrial cancer Neg Hx      Social History     Tobacco Use    Smoking status: Former Smoker     Types: Cigarettes     Quit date:      Years since quittin 5    Smokeless tobacco: Never Used   Vaping Use    Vaping Use: Never used   Substance Use Topics    Alcohol use:  Yes    Drug use: No       Current Outpatient Medications:     buPROPion (WELLBUTRIN SR) 150 mg 12 hr tablet, TAKE 1 TABLET BY MOUTH TWICE A DAY, Disp: 60 tablet, Rfl: 1    rosuvastatin (CRESTOR) 10 MG tablet, TAKE 1 TABLET BY MOUTH EVERY DAY, Disp: 30 tablet, Rfl: 1  Patient Active Problem List    Diagnosis Date Noted    Family history of breast cancer in first degree relative 2021    Stage 3a chronic kidney disease ([facility] [address] ) 10/06/2020    Drug-related hair loss 2020    Depression with anxiety 2020    History of colon cancer 2020    Asymptomatic postmenopausal status 2020    Dense breast tissue 2020    Pharyngitis 2020    Difficulty controlling anger 2019    Anxiety 2019    Lower extremity pain, left 2018    Tear of meniscus of left knee 2018    Malignant neoplasm of colon ([facility] [address] ) 2017    Early menopause occurring in patient age younger than 39 years 2017    Hyperlipidemia 2017    Left hip pain 2017    Trochanteric bursitis of left hip 2017       Allergies   Allergen Reactions    Iodinated Diagnostic Agents        OB History    Para Term  AB Living   1 1 1     1   SAB TAB Ectopic Multiple Live Births           1      # Outcome Date GA Lbr Issa/2nd Weight Sex Delivery Anes PTL Lv   1 Term              Has a 6 week granddaughter in [place]  Works for a [address]:    08/02/21 0741   BP: 116/78   BP Location: Left arm   Patient Position: Sitting   Cuff Size: Standard   Weight: 72 6 kg (160 lb)   Height: 5' 4" (1 626 m)     Body mass index is 27 46 kg/m²  Review of Systems   Constitutional: Negative for chills, fatigue, fever and unexpected weight change  Respiratory: Negative for shortness of breath  Gastrointestinal: Negative for anal bleeding, blood in stool, constipation and diarrhea  Genitourinary: Negative for difficulty urinating, dysuria and hematuria  Physical Exam   Constitutional: She appears well-developed and well-nourished  No distress  HENT: atraumatic, EOMI  Head: Normocephalic  Neck: Normal range of motion  Neck supple  Pulmonary: Effort normal   Breasts: bilateral without masses, skin changes or nipple discharge  Bilaterally soft and warm to touch  No areas of erythema or pain  Bilateral Implants  Abdominal: Soft  Pelvic exam was performed with patient supine  No labial fusion  There is no rash, tenderness, lesion or injury on the right labia  There is no rash, tenderness, lesion or injury on the left labia  Urethral meatus does not show any tenderness, inflammation or discharge  Palpation of midline bladder without pain or discomfort  Uterus is not deviated, not enlarged, not fixed and not tender  Cervix exhibits no motion tenderness, no discharge and no friability  Right adnexum displays no mass, no tenderness and no fullness  Left adnexum displays no mass, no tenderness and no fullness  No erythema or tenderness in the vagina  No foreign body in the vagina  No signs of injury around the vagina or anus  Perineum without lesions, signs of injury, erythema or swelling  No vaginal discharge found  Lymphadenopathy:        Right: No inguinal adenopathy present          Left: No inguinal adenopathy present

## 2021-08-19 DIAGNOSIS — E78.5 HYPERLIPIDEMIA, UNSPECIFIED HYPERLIPIDEMIA TYPE: ICD-10-CM

## 2021-08-19 RX ORDER — BUPROPION HYDROCHLORIDE 150 MG/1
TABLET, EXTENDED RELEASE ORAL
Qty: 60 TABLET | Refills: 1 | Status: SHIPPED | OUTPATIENT
Start: 2021-08-19 | End: 2021-10-16

## 2021-09-27 DIAGNOSIS — E78.5 HYPERLIPIDEMIA, UNSPECIFIED HYPERLIPIDEMIA TYPE: ICD-10-CM

## 2021-09-27 RX ORDER — ROSUVASTATIN CALCIUM 10 MG/1
TABLET, COATED ORAL
Qty: 30 TABLET | Refills: 1 | Status: SHIPPED | OUTPATIENT
Start: 2021-09-27 | End: 2021-12-01

## 2021-10-08 ENCOUNTER — HOSPITAL ENCOUNTER (OUTPATIENT)
Dept: MAMMOGRAPHY | Facility: CLINIC | Age: 51
Discharge: HOME | End: 2021-10-08
Payer: COMMERCIAL

## 2021-10-08 DIAGNOSIS — Z12.31 ENCOUNTER FOR SCREENING MAMMOGRAM FOR MALIGNANT NEOPLASM OF BREAST: ICD-10-CM

## 2021-10-08 PROCEDURE — 77067 SCR MAMMO BI INCL CAD: CPT

## 2021-10-08 PROCEDURE — 77063 BREAST TOMOSYNTHESIS BI: CPT

## 2021-11-14 DIAGNOSIS — E78.5 HYPERLIPIDEMIA, UNSPECIFIED HYPERLIPIDEMIA TYPE: ICD-10-CM

## 2021-11-14 RX ORDER — BUPROPION HYDROCHLORIDE 150 MG/1
TABLET, EXTENDED RELEASE ORAL
Qty: 60 TABLET | Refills: 0 | Status: SHIPPED | OUTPATIENT
Start: 2021-11-14 | End: 2021-12-08

## 2021-12-01 DIAGNOSIS — E78.5 HYPERLIPIDEMIA, UNSPECIFIED HYPERLIPIDEMIA TYPE: ICD-10-CM

## 2021-12-01 RX ORDER — ROSUVASTATIN CALCIUM 10 MG/1
TABLET, COATED ORAL
Qty: 30 TABLET | Refills: 1 | Status: SHIPPED | OUTPATIENT
Start: 2021-12-01 | End: 2021-12-20 | Stop reason: SDUPTHER

## 2021-12-08 DIAGNOSIS — E78.5 HYPERLIPIDEMIA, UNSPECIFIED HYPERLIPIDEMIA TYPE: ICD-10-CM

## 2021-12-08 RX ORDER — BUPROPION HYDROCHLORIDE 150 MG/1
TABLET, EXTENDED RELEASE ORAL
Qty: 60 TABLET | Refills: 0 | Status: SHIPPED | OUTPATIENT
Start: 2021-12-08 | End: 2021-12-20 | Stop reason: SDUPTHER

## 2021-12-20 ENCOUNTER — OFFICE VISIT (OUTPATIENT)
Dept: FAMILY MEDICINE CLINIC | Facility: CLINIC | Age: 51
End: 2021-12-20
Payer: COMMERCIAL

## 2021-12-20 VITALS
RESPIRATION RATE: 16 BRPM | OXYGEN SATURATION: 97 % | TEMPERATURE: 97.4 F | HEART RATE: 73 BPM | SYSTOLIC BLOOD PRESSURE: 114 MMHG | WEIGHT: 150.8 LBS | BODY MASS INDEX: 25.74 KG/M2 | DIASTOLIC BLOOD PRESSURE: 72 MMHG | HEIGHT: 64 IN

## 2021-12-20 DIAGNOSIS — F41.8 DEPRESSION WITH ANXIETY: ICD-10-CM

## 2021-12-20 DIAGNOSIS — E78.5 HYPERLIPIDEMIA, UNSPECIFIED HYPERLIPIDEMIA TYPE: ICD-10-CM

## 2021-12-20 DIAGNOSIS — Z11.59 NEED FOR HEPATITIS C SCREENING TEST: ICD-10-CM

## 2021-12-20 DIAGNOSIS — C18.9 MALIGNANT NEOPLASM OF COLON, UNSPECIFIED PART OF COLON (HCC): ICD-10-CM

## 2021-12-20 DIAGNOSIS — R63.4 WEIGHT LOSS: ICD-10-CM

## 2021-12-20 PROCEDURE — 3008F BODY MASS INDEX DOCD: CPT | Performed by: FAMILY MEDICINE

## 2021-12-20 PROCEDURE — 99214 OFFICE O/P EST MOD 30 MIN: CPT | Performed by: FAMILY MEDICINE

## 2021-12-20 RX ORDER — BUPROPION HYDROCHLORIDE 150 MG/1
150 TABLET, EXTENDED RELEASE ORAL 2 TIMES DAILY
Qty: 180 TABLET | Refills: 1 | Status: SHIPPED | OUTPATIENT
Start: 2021-12-20 | End: 2022-06-06

## 2021-12-20 RX ORDER — ROSUVASTATIN CALCIUM 10 MG/1
10 TABLET, COATED ORAL DAILY
Qty: 90 TABLET | Refills: 1 | Status: SHIPPED | OUTPATIENT
Start: 2021-12-20 | End: 2022-06-20

## 2022-01-06 ENCOUNTER — APPOINTMENT (OUTPATIENT)
Dept: LAB | Facility: HOSPITAL | Age: 52
End: 2022-01-06
Payer: COMMERCIAL

## 2022-01-06 DIAGNOSIS — R63.4 WEIGHT LOSS: ICD-10-CM

## 2022-01-06 DIAGNOSIS — C18.9 MALIGNANT NEOPLASM OF COLON, UNSPECIFIED PART OF COLON (HCC): ICD-10-CM

## 2022-01-06 DIAGNOSIS — Z11.59 NEED FOR HEPATITIS C SCREENING TEST: ICD-10-CM

## 2022-01-06 DIAGNOSIS — E78.5 HYPERLIPIDEMIA, UNSPECIFIED HYPERLIPIDEMIA TYPE: ICD-10-CM

## 2022-01-06 LAB
ALBUMIN SERPL BCP-MCNC: 3.6 G/DL (ref 3.5–5)
ALP SERPL-CCNC: 71 U/L (ref 46–116)
ALT SERPL W P-5'-P-CCNC: 45 U/L (ref 12–78)
ANION GAP SERPL CALCULATED.3IONS-SCNC: 9 MMOL/L (ref 4–13)
AST SERPL W P-5'-P-CCNC: 26 U/L (ref 5–45)
BACTERIA UR QL AUTO: ABNORMAL /HPF
BASOPHILS # BLD AUTO: 0.07 THOUSANDS/ΜL (ref 0–0.1)
BASOPHILS NFR BLD AUTO: 1 % (ref 0–1)
BILIRUB SERPL-MCNC: 0.3 MG/DL (ref 0.2–1)
BILIRUB UR QL STRIP: NEGATIVE
BUN SERPL-MCNC: 11 MG/DL (ref 5–25)
CALCIUM SERPL-MCNC: 8.8 MG/DL (ref 8.3–10.1)
CHLORIDE SERPL-SCNC: 103 MMOL/L (ref 100–108)
CHOLEST SERPL-MCNC: 189 MG/DL
CLARITY UR: CLEAR
CO2 SERPL-SCNC: 28 MMOL/L (ref 21–32)
COLOR UR: YELLOW
CREAT SERPL-MCNC: 1.03 MG/DL (ref 0.6–1.3)
EOSINOPHIL # BLD AUTO: 0.36 THOUSAND/ΜL (ref 0–0.61)
EOSINOPHIL NFR BLD AUTO: 4 % (ref 0–6)
ERYTHROCYTE [DISTWIDTH] IN BLOOD BY AUTOMATED COUNT: 13 % (ref 11.6–15.1)
GFR SERPL CREATININE-BSD FRML MDRD: 63 ML/MIN/1.73SQ M
GLUCOSE P FAST SERPL-MCNC: 104 MG/DL (ref 65–99)
GLUCOSE UR STRIP-MCNC: NEGATIVE MG/DL
HCT VFR BLD AUTO: 43.3 % (ref 34.8–46.1)
HCV AB SER QL: NORMAL
HDLC SERPL-MCNC: 84 MG/DL
HGB BLD-MCNC: 14.3 G/DL (ref 11.5–15.4)
HGB UR QL STRIP.AUTO: NEGATIVE
IMM GRANULOCYTES # BLD AUTO: 0.02 THOUSAND/UL (ref 0–0.2)
IMM GRANULOCYTES NFR BLD AUTO: 0 % (ref 0–2)
KETONES UR STRIP-MCNC: NEGATIVE MG/DL
LDLC SERPL CALC-MCNC: 95 MG/DL (ref 0–100)
LEUKOCYTE ESTERASE UR QL STRIP: NEGATIVE
LYMPHOCYTES # BLD AUTO: 2.69 THOUSANDS/ΜL (ref 0.6–4.47)
LYMPHOCYTES NFR BLD AUTO: 31 % (ref 14–44)
MCH RBC QN AUTO: 31.3 PG (ref 26.8–34.3)
MCHC RBC AUTO-ENTMCNC: 33 G/DL (ref 31.4–37.4)
MCV RBC AUTO: 95 FL (ref 82–98)
MONOCYTES # BLD AUTO: 0.83 THOUSAND/ΜL (ref 0.17–1.22)
MONOCYTES NFR BLD AUTO: 9 % (ref 4–12)
NEUTROPHILS # BLD AUTO: 4.82 THOUSANDS/ΜL (ref 1.85–7.62)
NEUTS SEG NFR BLD AUTO: 55 % (ref 43–75)
NITRITE UR QL STRIP: NEGATIVE
NON-SQ EPI CELLS URNS QL MICRO: ABNORMAL /HPF
NONHDLC SERPL-MCNC: 105 MG/DL
NRBC BLD AUTO-RTO: 0 /100 WBCS
PH UR STRIP.AUTO: 7 [PH]
PLATELET # BLD AUTO: 359 THOUSANDS/UL (ref 149–390)
PMV BLD AUTO: 8.6 FL (ref 8.9–12.7)
POTASSIUM SERPL-SCNC: 4.4 MMOL/L (ref 3.5–5.3)
PROT SERPL-MCNC: 6.8 G/DL (ref 6.4–8.2)
PROT UR STRIP-MCNC: NEGATIVE MG/DL
RBC # BLD AUTO: 4.57 MILLION/UL (ref 3.81–5.12)
RBC #/AREA URNS AUTO: ABNORMAL /HPF
SODIUM SERPL-SCNC: 140 MMOL/L (ref 136–145)
SP GR UR STRIP.AUTO: 1.01 (ref 1–1.03)
TRIGL SERPL-MCNC: 52 MG/DL
TSH SERPL DL<=0.05 MIU/L-ACNC: 2.4 UIU/ML (ref 0.36–3.74)
UROBILINOGEN UR QL STRIP.AUTO: 0.2 E.U./DL
WBC # BLD AUTO: 8.79 THOUSAND/UL (ref 4.31–10.16)
WBC #/AREA URNS AUTO: ABNORMAL /HPF

## 2022-01-06 PROCEDURE — 36415 COLL VENOUS BLD VENIPUNCTURE: CPT

## 2022-01-06 PROCEDURE — 80053 COMPREHEN METABOLIC PANEL: CPT

## 2022-01-06 PROCEDURE — 80061 LIPID PANEL: CPT

## 2022-01-06 PROCEDURE — 85025 COMPLETE CBC W/AUTO DIFF WBC: CPT

## 2022-01-06 PROCEDURE — 81001 URINALYSIS AUTO W/SCOPE: CPT | Performed by: FAMILY MEDICINE

## 2022-01-06 PROCEDURE — 86803 HEPATITIS C AB TEST: CPT

## 2022-01-06 PROCEDURE — 84443 ASSAY THYROID STIM HORMONE: CPT

## 2022-02-03 ENCOUNTER — SOCIAL WORK (OUTPATIENT)
Dept: BEHAVIORAL/MENTAL HEALTH CLINIC | Facility: CLINIC | Age: 52
End: 2022-02-03
Payer: COMMERCIAL

## 2022-02-03 DIAGNOSIS — F41.8 DEPRESSION WITH ANXIETY: Primary | ICD-10-CM

## 2022-02-03 PROCEDURE — 90834 PSYTX W PT 45 MINUTES: CPT | Performed by: SOCIAL WORKER

## 2022-02-03 NOTE — PSYCH
8: 30am-9:20am    Virtual Regular Visit  Therapist met w/pt who is a 54year old female for initial session due to increase in symptoms(irritability, sadness, anger, racing thoughts)  Pt shared that she doesn't want to dig up things from the past but wants to focus on learning how manage her anger  Pt was born and raised in [place], [place] and identified having a rough childhood  She was 1 out of 5 kids and the youngest   She lost a brother to a drug overdose  One of her brothers currently lives with her and is about 2 years clean  Pt shared she bends over backwards for people and is always wanting to "please" others but then she becomes resentful  She identified that she is resentful of others and herself for helping others that may not deserve it  Therapist and pt discussed strategies that pt can begin to implement to help w/her underlying anger issues  Pt stated that exercise has always been a good outlet for her and she realizes she needs to get back into it  Therapist and pt also discussed ways to work on challenging her thoughts  She stated she has done therapy before but ends up not continuing but is hoping that this is different  Verification of patient location:    Patient is located in the following state in which I hold an active license [place]      Assessment/Plan: f/u in two weeks    Problem List Items Addressed This Visit        Other    Depression with anxiety - Primary                 Reason for visit is No chief complaint on file  Encounter provider Denny Talavera    Provider located at [facility] [address]  [address]  [facility] [address] [place] [address] [address]      Recent Visits  No visits were found meeting these conditions  Showing recent visits within past 7 days and meeting all other requirements  Future Appointments  No visits were found meeting these conditions    Showing future appointments within next 150 days and meeting all other requirements       The patient was identified by name and date of birth  Jesse Luke was informed that this is a telemedicine visit and that [facility] and patient was informed that this is a secure, HIPAA-compliant platform  She agrees to proceed     My office door was closed  No one else was in the room  She acknowledged consent and understanding of privacy and security of the video platform  The patient has agreed to participate and understands they can discontinue the visit at any time  Patient is aware this is a billable service  Subjective  Jesse Luke is a 54 y o  female    HPI  45 minutes  Past Medical History:   Diagnosis Date    Cancer [facility])     Colon cancer ([facility] [address] ) 2014    History of chemotherapy 2014    colon ca    History of radiation therapy 2014    colon ca    History of rectal cancer 2014    Locally advanced     History of tear of meniscus of knee joint     Hyperlipidemia     Tennis elbow        Past Surgical History:   Procedure Laterality Date    AUGMENTATION MAMMAPLASTY Bilateral 2005    BREAST SURGERY Bilateral     RECONSTRUCION WITH IMPLANT PROTHESIS     COLON SURGERY      ELBOW SURGERY Left 2015    Tennis elbow    KNEE SURGERY      MENISCECTOMY Left 06/2016       Current Outpatient Medications   Medication Sig Dispense Refill    buPROPion (WELLBUTRIN SR) 150 mg 12 hr tablet Take 1 tablet (150 mg total) by mouth 2 (two) times a day 180 tablet 1    rosuvastatin (CRESTOR) 10 MG tablet Take 1 tablet (10 mg total) by mouth daily 90 tablet 1     No current facility-administered medications for this visit  Allergies   Allergen Reactions    Iodinated Diagnostic Agents        Review of Systems    Video Exam    There were no vitals filed for this visit      Physical Exam  Pt denied any SI/HI/Ah/VH    I spent 45 minutes directly with the patient during this visit    VIRTUAL VISIT [facility] verbally agrees to participate in [employer]  Pt is aware that [employer] could be limited without vital signs or the ability to perform a full hands-on physical Edissourav Jama understands she or the provider may request at any time to terminate the video visit and request the patient to seek care or treatment in person

## 2022-02-14 ENCOUNTER — HOSPITAL ENCOUNTER (OUTPATIENT)
Dept: ULTRASOUND IMAGING | Facility: CLINIC | Age: 52
Discharge: HOME | End: 2022-02-14
Payer: COMMERCIAL

## 2022-02-14 DIAGNOSIS — Z12.31 ENCOUNTER FOR SCREENING MAMMOGRAM FOR MALIGNANT NEOPLASM OF BREAST: ICD-10-CM

## 2022-02-14 DIAGNOSIS — Z80.3 FAMILY HISTORY OF BREAST CANCER IN FIRST DEGREE RELATIVE: ICD-10-CM

## 2022-02-14 PROCEDURE — 76641 ULTRASOUND BREAST COMPLETE: CPT

## 2022-02-22 ENCOUNTER — OFFICE VISIT (OUTPATIENT)
Dept: GASTROENTEROLOGY | Facility: CLINIC | Age: 52
End: 2022-02-22
Payer: COMMERCIAL

## 2022-02-22 VITALS
HEIGHT: 64 IN | OXYGEN SATURATION: 93 % | TEMPERATURE: 98.6 F | HEART RATE: 72 BPM | WEIGHT: 154 LBS | DIASTOLIC BLOOD PRESSURE: 77 MMHG | SYSTOLIC BLOOD PRESSURE: 122 MMHG | BODY MASS INDEX: 26.29 KG/M2

## 2022-02-22 DIAGNOSIS — C18.9 MALIGNANT NEOPLASM OF COLON, UNSPECIFIED PART OF COLON (HCC): ICD-10-CM

## 2022-02-22 DIAGNOSIS — R19.7 DIARRHEA, UNSPECIFIED TYPE: Primary | ICD-10-CM

## 2022-02-22 PROCEDURE — 3008F BODY MASS INDEX DOCD: CPT | Performed by: INTERNAL MEDICINE

## 2022-02-22 PROCEDURE — 99214 OFFICE O/P EST MOD 30 MIN: CPT | Performed by: INTERNAL MEDICINE

## 2022-02-22 RX ORDER — DICYCLOMINE HYDROCHLORIDE 10 MG/1
10 CAPSULE ORAL 3 TIMES DAILY PRN
Qty: 90 CAPSULE | Refills: 0 | Status: SHIPPED | OUTPATIENT
Start: 2022-02-22

## 2022-02-22 NOTE — PROGRESS NOTES
[facility]  Progress Note - Rubio Lunch 54 y o  female MRN: [id number]    Unit/Bed#:  Encounter: [id number]    Assessment/Plan:    1  Diarrhea-likely post infectious IBS versus infectious colitis versus less likely new diagnosis of celiac disease or microscopic colitis  -would recommend checking stool studies for infectious etiologies including C diff, ova and parasite and stool PCR  -would recommend checking CBC and CMP  -recommend lactose-free, low residue diet for the next several weeks  -recommend starting on a daily probiotic   -recommend dicyclomine 10 mg t i d  P r n  cramping abdominal pain and diarrhea  -recommend checking celiac serologies and inflammatory markers as well to rule out IBD and celiac disease   -if above workup is negative and patient continues to have persistent symptoms, may have to consider repeating colonoscopy to assess for microscopic colitis  2  History of rectal  Ca ( high-grade neuroendocrine, adenocarcinoma) status post transanal excision and chemoradiation in 2014 at [facility] in 2014-most recent colonoscopy was in April of 2021  As per patient, this was normal   She reports that she will be due for repeat colonoscopy at 3 year interval     -obtain records of the colonoscopy report from [facility]  3  Will follow-up in 2 months, sooner if patient has worsening symptoms  Subjective: This is a very pleasant 82-year-old female with history of colorectal cancer diagnosed in 2014, treated with chemo radiation at [facility] who presents for follow-up  She was seen by Dr Liana Bonner in 2020 to establish care  She has recently undergone colonoscopy at [facility] in April of 2021 and was recommended a repeat at 3 year interval   Patient reports that she is feeling generally well however made this appointment as she started having change in bowel habits almost 4 weeks ago    Patient reports that symptoms began after a bout of fever, chills and diarrhea  She reports that while the fever and the chills have subsided, she has had persistence postprandial diarrhea  Patient reports that she has not had any blood in her stool or any mucus in the stool but does get cramping and diarrhea after eating  She denies having similar symptoms in the past   She denies melena  She denies any upper GI symptoms including acid reflux, epigastric abdominal pain, nausea or vomiting  No hematemesis, coffee-ground emesis or melena  She reports that she has lost some weight over the past few months however attributes this to stress, she recently had several family members a passed away including her mother-in-law  No new medications  No other herbal supplements  No other sick contacts  Objective:     Vitals: Blood pressure 122/77, pulse 72, temperature 98 6 °F (37 °C), height 5' 4" (1 626 m), weight 69 9 kg (154 lb), last menstrual period 07/01/2014, SpO2 93 %, not currently breastfeeding  ,Body mass index is 26 43 kg/m²  [unfilled]    Physical Exam:    GEN: wn/wd, NAD  HEENT: MMM, no cervical or supraclavicular LAD, anciteric  CV: RRR, no m/r/g  CHEST: CTA b/l, no w/r/r  ABD: +BS, soft, NT/ND, no hepatosplenomegaly  EXT: no c/c/e  SKIN: no rashes  NEURO: aaox3      Invasive Devices  Report    None                         Lab, Imaging and other studies:     No visits with results within 1 Day(s) from this visit     Latest known visit with results is:   Appointment on 01/06/2022   Component Date Value    Sodium 01/06/2022 140     Potassium 01/06/2022 4 4     Chloride 01/06/2022 103     CO2 01/06/2022 28     ANION GAP 01/06/2022 9     BUN 01/06/2022 11     Creatinine 01/06/2022 1 03     Glucose, Fasting 01/06/2022 104*    Calcium 01/06/2022 8 8     AST 01/06/2022 26     ALT 01/06/2022 45     Alkaline Phosphatase 01/06/2022 71     Total Protein 01/06/2022 6 8     Albumin 01/06/2022 3 6     Total Bilirubin 01/06/2022 0 30     eGFR 01/06/2022 63     Hepatitis C Ab 01/06/2022 Non-reactive     WBC 01/06/2022 8 79     RBC 01/06/2022 4 57     Hemoglobin 01/06/2022 14 3     Hematocrit 01/06/2022 43 3     MCV 01/06/2022 95     MCH 01/06/2022 31 3     MCHC 01/06/2022 33 0     RDW 01/06/2022 13 0     MPV 01/06/2022 8 6*    Platelets 38/45/8539 359     nRBC 01/06/2022 0     Neutrophils Relative 01/06/2022 55     Immat GRANS % 01/06/2022 0     Lymphocytes Relative 01/06/2022 31     Monocytes Relative 01/06/2022 9     Eosinophils Relative 01/06/2022 4     Basophils Relative 01/06/2022 1     Neutrophils Absolute 01/06/2022 4 82     Immature Grans Absolute 01/06/2022 0 02     Lymphocytes Absolute 01/06/2022 2 69     Monocytes Absolute 01/06/2022 0 83     Eosinophils Absolute 01/06/2022 0 36     Basophils Absolute 01/06/2022 0 07     Cholesterol 01/06/2022 189     Triglycerides 01/06/2022 52     HDL, Direct 01/06/2022 84     LDL Calculated 01/06/2022 95     Non-HDL-Chol (CHOL-HDL) 01/06/2022 105     TSH 3RD GENERATON 01/06/2022 2 400          I have personally reviewed pertinent films in PACS    No current facility-administered medications for this visit

## 2022-02-22 NOTE — LETTER
February 22, 2022     Dimitri Henley, [address] [facility]    Patient: Jemma Burrell   YOB: 1970   Date of Visit: 2/22/2022       Dear Dr Iman Euceda:    Thank you for referring Jemma Burrell to me for evaluation  Below are my notes for this consultation  If you have questions, please do not hesitate to call me  I look forward to following your patient along with you  Sincerely,        Abena Haile MD        CC: No Recipients  Abena Haile MD  2/22/2022  5:34 PM  Sign when Signing Visit  [facility]  Progress Note - Jemma Burrell 54 y o  female MRN: [id number]    Unit/Bed#:  Encounter: [id number]    Assessment/Plan:    1  Diarrhea-likely post infectious IBS versus infectious colitis versus less likely new diagnosis of celiac disease or microscopic colitis  -would recommend checking stool studies for infectious etiologies including C diff, ova and parasite and stool PCR  -would recommend checking CBC and CMP  -recommend lactose-free, low residue diet for the next several weeks  -recommend starting on a daily probiotic   -recommend dicyclomine 10 mg t i d  P r n  cramping abdominal pain and diarrhea  -recommend checking celiac serologies and inflammatory markers as well to rule out IBD and celiac disease   -if above workup is negative and patient continues to have persistent symptoms, may have to consider repeating colonoscopy to assess for microscopic colitis  2  History of rectal  Ca ( high-grade neuroendocrine, adenocarcinoma) status post transanal excision and chemoradiation in 2014 at [facility] in 2014-most recent colonoscopy was in April of 2021  As per patient, this was normal   She reports that she will be due for repeat colonoscopy at 3 year interval     -obtain records of the colonoscopy report from [facility]  3  Will follow-up in 2 months, sooner if patient has worsening symptoms  Subjective:    This is a very pleasant 82-year-old female with history of colorectal cancer diagnosed in 2014, treated with chemo radiation at [facility] who presents for follow-up  She was seen by Dr Maria Del Carmen Siddiqi in 2020 to establish care  She has recently undergone colonoscopy at [facility] in April of 2021 and was recommended a repeat at 3 year interval   Patient reports that she is feeling generally well however made this appointment as she started having change in bowel habits almost 4 weeks ago  Patient reports that symptoms began after a bout of fever, chills and diarrhea  She reports that while the fever and the chills have subsided, she has had persistence postprandial diarrhea  Patient reports that she has not had any blood in her stool or any mucus in the stool but does get cramping and diarrhea after eating  She denies having similar symptoms in the past   She denies melena  She denies any upper GI symptoms including acid reflux, epigastric abdominal pain, nausea or vomiting  No hematemesis, coffee-ground emesis or melena  She reports that she has lost some weight over the past few months however attributes this to stress, she recently had several family members a passed away including her mother-in-law  No new medications  No other herbal supplements  No other sick contacts  Objective:     Vitals: Blood pressure 122/77, pulse 72, temperature 98 6 °F (37 °C), height 5' 4" (1 626 m), weight 69 9 kg (154 lb), last menstrual period 07/01/2014, SpO2 93 %, not currently breastfeeding  ,Body mass index is 26 43 kg/m²  [unfilled]    Physical Exam:    GEN: wn/wd, NAD  HEENT: MMM, no cervical or supraclavicular LAD, anciteric  CV: RRR, no m/r/g  CHEST: CTA b/l, no w/r/r  ABD: +BS, soft, NT/ND, no hepatosplenomegaly  EXT: no c/c/e  SKIN: no rashes  NEURO: aaox3      Invasive Devices  Report    None                         Lab, Imaging and other studies:     No visits with results within 1 Day(s) from this visit     Latest known visit with results is:   Appointment on 01/06/2022   Component Date Value    Sodium 01/06/2022 140     Potassium 01/06/2022 4 4     Chloride 01/06/2022 103     CO2 01/06/2022 28     ANION GAP 01/06/2022 9     BUN 01/06/2022 11     Creatinine 01/06/2022 1 03     Glucose, Fasting 01/06/2022 104*    Calcium 01/06/2022 8 8     AST 01/06/2022 26     ALT 01/06/2022 45     Alkaline Phosphatase 01/06/2022 71     Total Protein 01/06/2022 6 8     Albumin 01/06/2022 3 6     Total Bilirubin 01/06/2022 0 30     eGFR 01/06/2022 63     Hepatitis C Ab 01/06/2022 Non-reactive     WBC 01/06/2022 8 79     RBC 01/06/2022 4 57     Hemoglobin 01/06/2022 14 3     Hematocrit 01/06/2022 43 3     MCV 01/06/2022 95     MCH 01/06/2022 31 3     MCHC 01/06/2022 33 0     RDW 01/06/2022 13 0     MPV 01/06/2022 8 6*    Platelets 49/87/9792 359     nRBC 01/06/2022 0     Neutrophils Relative 01/06/2022 55     Immat GRANS % 01/06/2022 0     Lymphocytes Relative 01/06/2022 31     Monocytes Relative 01/06/2022 9     Eosinophils Relative 01/06/2022 4     Basophils Relative 01/06/2022 1     Neutrophils Absolute 01/06/2022 4 82     Immature Grans Absolute 01/06/2022 0 02     Lymphocytes Absolute 01/06/2022 2 69     Monocytes Absolute 01/06/2022 0 83     Eosinophils Absolute 01/06/2022 0 36     Basophils Absolute 01/06/2022 0 07     Cholesterol 01/06/2022 189     Triglycerides 01/06/2022 52     HDL, Direct 01/06/2022 84     LDL Calculated 01/06/2022 95     Non-HDL-Chol (CHOL-HDL) 01/06/2022 105     TSH 3RD GENERATON 01/06/2022 2 400          I have personally reviewed pertinent films in PACS    No current facility-administered medications for this visit

## 2022-03-02 ENCOUNTER — APPOINTMENT (OUTPATIENT)
Dept: LAB | Facility: HOSPITAL | Age: 52
End: 2022-03-02
Payer: COMMERCIAL

## 2022-03-02 DIAGNOSIS — R19.7 DIARRHEA, UNSPECIFIED TYPE: ICD-10-CM

## 2022-03-02 LAB
ALBUMIN SERPL BCP-MCNC: 3.9 G/DL (ref 3.5–5)
ALP SERPL-CCNC: 79 U/L (ref 46–116)
ALT SERPL W P-5'-P-CCNC: 27 U/L (ref 12–78)
ANION GAP SERPL CALCULATED.3IONS-SCNC: 8 MMOL/L (ref 4–13)
AST SERPL W P-5'-P-CCNC: 24 U/L (ref 5–45)
BASOPHILS # BLD AUTO: 0.05 THOUSANDS/ΜL (ref 0–0.1)
BASOPHILS NFR BLD AUTO: 1 % (ref 0–1)
BILIRUB SERPL-MCNC: 0.24 MG/DL (ref 0.2–1)
BUN SERPL-MCNC: 10 MG/DL (ref 5–25)
CALCIUM SERPL-MCNC: 9 MG/DL (ref 8.3–10.1)
CHLORIDE SERPL-SCNC: 101 MMOL/L (ref 100–108)
CO2 SERPL-SCNC: 28 MMOL/L (ref 21–32)
CREAT SERPL-MCNC: 0.87 MG/DL (ref 0.6–1.3)
CRP SERPL QL: <3 MG/L
EOSINOPHIL # BLD AUTO: 0.17 THOUSAND/ΜL (ref 0–0.61)
EOSINOPHIL NFR BLD AUTO: 2 % (ref 0–6)
ERYTHROCYTE [DISTWIDTH] IN BLOOD BY AUTOMATED COUNT: 12.9 % (ref 11.6–15.1)
ERYTHROCYTE [SEDIMENTATION RATE] IN BLOOD: 14 MM/HOUR (ref 0–19)
GFR SERPL CREATININE-BSD FRML MDRD: 78 ML/MIN/1.73SQ M
GLUCOSE SERPL-MCNC: 95 MG/DL (ref 65–140)
HCT VFR BLD AUTO: 44.3 % (ref 34.8–46.1)
HGB BLD-MCNC: 14.7 G/DL (ref 11.5–15.4)
IMM GRANULOCYTES # BLD AUTO: 0.01 THOUSAND/UL (ref 0–0.2)
IMM GRANULOCYTES NFR BLD AUTO: 0 % (ref 0–2)
LYMPHOCYTES # BLD AUTO: 2.85 THOUSANDS/ΜL (ref 0.6–4.47)
LYMPHOCYTES NFR BLD AUTO: 27 % (ref 14–44)
MCH RBC QN AUTO: 30.9 PG (ref 26.8–34.3)
MCHC RBC AUTO-ENTMCNC: 33.2 G/DL (ref 31.4–37.4)
MCV RBC AUTO: 93 FL (ref 82–98)
MONOCYTES # BLD AUTO: 0.6 THOUSAND/ΜL (ref 0.17–1.22)
MONOCYTES NFR BLD AUTO: 6 % (ref 4–12)
NEUTROPHILS # BLD AUTO: 6.73 THOUSANDS/ΜL (ref 1.85–7.62)
NEUTS SEG NFR BLD AUTO: 64 % (ref 43–75)
NRBC BLD AUTO-RTO: 0 /100 WBCS
PLATELET # BLD AUTO: 377 THOUSANDS/UL (ref 149–390)
PMV BLD AUTO: 9.3 FL (ref 8.9–12.7)
POTASSIUM SERPL-SCNC: 3.9 MMOL/L (ref 3.5–5.3)
PROT SERPL-MCNC: 7.2 G/DL (ref 6.4–8.2)
RBC # BLD AUTO: 4.76 MILLION/UL (ref 3.81–5.12)
SODIUM SERPL-SCNC: 137 MMOL/L (ref 136–145)
WBC # BLD AUTO: 10.41 THOUSAND/UL (ref 4.31–10.16)

## 2022-03-02 PROCEDURE — 80053 COMPREHEN METABOLIC PANEL: CPT

## 2022-03-02 PROCEDURE — 82784 ASSAY IGA/IGD/IGG/IGM EACH: CPT

## 2022-03-02 PROCEDURE — 86258 DGP ANTIBODY EACH IG CLASS: CPT

## 2022-03-02 PROCEDURE — 36415 COLL VENOUS BLD VENIPUNCTURE: CPT

## 2022-03-02 PROCEDURE — 86231 EMA EACH IG CLASS: CPT

## 2022-03-02 PROCEDURE — 86364 TISS TRNSGLTMNASE EA IG CLAS: CPT

## 2022-03-02 PROCEDURE — 85025 COMPLETE CBC W/AUTO DIFF WBC: CPT

## 2022-03-02 PROCEDURE — 85652 RBC SED RATE AUTOMATED: CPT

## 2022-03-02 PROCEDURE — 86140 C-REACTIVE PROTEIN: CPT

## 2022-03-04 LAB
ENDOMYSIUM IGA SER QL: NEGATIVE
GLIADIN PEPTIDE IGA SER-ACNC: 2 UNITS (ref 0–19)
GLIADIN PEPTIDE IGG SER-ACNC: <1 UNITS (ref 0–19)
IGA SERPL-MCNC: 133 MG/DL (ref 87–352)
TTG IGA SER-ACNC: <2 U/ML (ref 0–3)
TTG IGG SER-ACNC: <2 U/ML (ref 0–5)

## 2022-03-10 ENCOUNTER — TELEMEDICINE (OUTPATIENT)
Dept: BEHAVIORAL/MENTAL HEALTH CLINIC | Facility: CLINIC | Age: 52
End: 2022-03-10
Payer: COMMERCIAL

## 2022-03-10 DIAGNOSIS — R45.4 DIFFICULTY CONTROLLING ANGER: ICD-10-CM

## 2022-03-10 DIAGNOSIS — F41.8 DEPRESSION WITH ANXIETY: ICD-10-CM

## 2022-03-10 DIAGNOSIS — F41.9 ANXIETY: Primary | ICD-10-CM

## 2022-03-10 PROCEDURE — 90834 PSYTX W PT 45 MINUTES: CPT | Performed by: SOCIAL WORKER

## 2022-03-10 NOTE — PSYCH
7: 30am-8:15am    Virtual Regular Visit  Therapist met w/pt for individual session  Pt apologized for cancelling last session but needed to due to the death of her mother in law  Pt shared she is on a verge of a nervous breakdown  Therapist and pt discussed pt's current stressors and how overtired and burnt out she is  She stated that she realized her emotional state and decided to take off next week which will be helpful but needs to work on problem solving how to make it emotionally until then  Therapist and pt discussed things pt can do to help alleviate some of her symptoms  Pt identified a few things work related that she could change to help w/her overall stress level  Pt also stated that she hasn't been taking her medication which she thinks could also help  Verification of patient location:    Patient is located in the following state in which I hold an active license [place]      Assessment/Plan: f/u in two weeks    Problem List Items Addressed This Visit        Other    Difficulty controlling anger    Anxiety - Primary    Depression with anxiety               Reason for visit is No chief complaint on file  Encounter provider Lashawn Phillips    Provider located at [facility] [address]  [address]  [facility] [address] [place]      Recent Visits  No visits were found meeting these conditions  Showing recent visits within past 7 days and meeting all other requirements  Future Appointments  No visits were found meeting these conditions  Showing future appointments within next 150 days and meeting all other requirements       The patient was identified by name and date of birth  Nora Jump was informed that this is a telemedicine visit and that the [facility] being [facility] and patient was informed that this is a secure, HIPAA-compliant platform  She agrees to proceed     My office door was closed  No one else was in the room  She acknowledged consent and understanding of privacy and security of the video platform  The patient has agreed to participate and understands they can discontinue the visit at any time  Patient is aware this is a billable service  Subjective  Hermelinda Casiano is a 54 y o  female    HPI 45 minutes    Past Medical History:   Diagnosis Date    Cancer [facility])     Colon cancer ([address] ) 2014    History of chemotherapy 2014    colon ca    History of radiation therapy 2014    colon ca    History of rectal cancer 2014    Locally advanced     History of tear of meniscus of knee joint     Hyperlipidemia     Tennis elbow        Past Surgical History:   Procedure Laterality Date    AUGMENTATION MAMMAPLASTY Bilateral 2005    BREAST SURGERY Bilateral     RECONSTRUCION WITH IMPLANT PROTHESIS     COLON SURGERY      COLONOSCOPY  04/2021    ELBOW SURGERY Left 2015    Tennis elbow    KNEE SURGERY      MENISCECTOMY Left 06/2016       Current Outpatient Medications   Medication Sig Dispense Refill    buPROPion (WELLBUTRIN SR) 150 mg 12 hr tablet Take 1 tablet (150 mg total) by mouth 2 (two) times a day (Patient taking differently: Take 150 mg by mouth 2 (two) times a day as needed If needed ) 180 tablet 1    dicyclomine (BENTYL) 10 mg capsule Take 1 capsule (10 mg total) by mouth 3 (three) times a day as needed (diarrhea and abdominal pain) 90 capsule 0    rosuvastatin (CRESTOR) 10 MG tablet Take 1 tablet (10 mg total) by mouth daily 90 tablet 1     No current facility-administered medications for this visit  Allergies   Allergen Reactions    Iodinated Diagnostic Agents        Review of Systems    Video Exam    There were no vitals filed for this visit      Physical Exam Pt denied any Si/HI/Ah/Vh    I spent 45 minutes directly with the patient during this visit    VIRTUAL VISIT DISCLAIMER    Hermelinda Casiano verbally agrees to participate in [facility]  Pt is aware that [employer] could be limited without vital signs or the ability to perform a full hands-on physical Shana Fentress understands she or the provider may request at any time to terminate the video visit and request the patient to seek care or treatment in person

## 2022-03-24 ENCOUNTER — TELEMEDICINE (OUTPATIENT)
Dept: BEHAVIORAL/MENTAL HEALTH CLINIC | Facility: CLINIC | Age: 52
End: 2022-03-24
Payer: COMMERCIAL

## 2022-03-24 DIAGNOSIS — F41.8 DEPRESSION WITH ANXIETY: Primary | ICD-10-CM

## 2022-03-24 PROCEDURE — 90834 PSYTX W PT 45 MINUTES: CPT | Performed by: SOCIAL WORKER

## 2022-03-24 NOTE — PSYCH
7: 00am-7:45am    Virtual Regular Visit  Therapist met w/pt for individual session  Pt reported having decreased irritability but still struggling w/racing thoughts  Pt shared that she hasn't been as sad, overwhelmed, and anger the past few weeks  Therapist and pt discussed factors contributed to pt's improved mood  Pt shared she has been implementing some changes at work and also had a week off where she could regroup  Therapist and pt discussed ongoing things that pt can do to help work towards emotional stability  Pt also started taking her medication again  Therapist and pt discussed pt's cycle of mood shifts every few weeks which will be something to continue to monitor and may change her diagnosis  Verification of patient location:    Patient is located in the following state in which I hold an active license [place]      Assessment/Plan:    Problem List Items Addressed This Visit        Other    Depression with anxiety - Primary                 Reason for visit is No chief complaint on file  Encounter provider Bernabe Narayanan    Provider located at [facility] [address]  [address]  [place] [address] [place]      Recent Visits  No visits were found meeting these conditions  Showing recent visits within past 7 days and meeting all other requirements  Future Appointments  No visits were found meeting these conditions  Showing future appointments within next 150 days and meeting all other requirements       The patient was identified by name and date of birth  Bernie Amanda was informed that this is a telemedicine visit and that the [facility] and patient was informed that this is a secure, HIPAA-compliant platform  She agrees to proceed     My office door was closed  No one else was in the room    She acknowledged consent and understanding of privacy and security of the video platform  The patient has agreed to participate and understands they can discontinue the visit at any time  Patient is aware this is a billable service  Subjective  Bernie Amanda is a 54 y o  female    HPI 45 minutes    Past Medical History:   Diagnosis Date    Cancer [facility])     Colon cancer ([address] ) 2014    History of chemotherapy 2014    colon ca    History of radiation therapy 2014    colon ca    History of rectal cancer 2014    Locally advanced     History of tear of meniscus of knee joint     Hyperlipidemia     Tennis elbow        Past Surgical History:   Procedure Laterality Date    AUGMENTATION MAMMAPLASTY Bilateral 2005    BREAST SURGERY Bilateral     RECONSTRUCION WITH IMPLANT PROTHESIS     COLON SURGERY      COLONOSCOPY  04/2021    ELBOW SURGERY Left 2015    Tennis elbow    KNEE SURGERY      MENISCECTOMY Left 06/2016       Current Outpatient Medications   Medication Sig Dispense Refill    buPROPion (WELLBUTRIN SR) 150 mg 12 hr tablet Take 1 tablet (150 mg total) by mouth 2 (two) times a day (Patient taking differently: Take 150 mg by mouth 2 (two) times a day as needed If needed ) 180 tablet 1    dicyclomine (BENTYL) 10 mg capsule Take 1 capsule (10 mg total) by mouth 3 (three) times a day as needed (diarrhea and abdominal pain) 90 capsule 0    rosuvastatin (CRESTOR) 10 MG tablet Take 1 tablet (10 mg total) by mouth daily 90 tablet 1     No current facility-administered medications for this visit  Allergies   Allergen Reactions    Iodinated Diagnostic Agents        Review of Systems    Video Exam    There were no vitals filed for this visit  Physical Exam Pt denied any SI/HI/Ah/VH    I spent 45 minutes directly with the patient during this visit    VIRTUAL VISIT DISCLAIMER    Bernie Amanda verbally agrees to participate in [facility]   Pt is aware that [facility] could be limited without vital signs or the ability to perform a full hands-on walker [employer]ides understands she or the provider may request at any time to terminate the video visit and request the patient to seek care or treatment in person

## 2022-04-14 ENCOUNTER — TELEMEDICINE (OUTPATIENT)
Dept: BEHAVIORAL/MENTAL HEALTH CLINIC | Facility: CLINIC | Age: 52
End: 2022-04-14
Payer: COMMERCIAL

## 2022-04-14 DIAGNOSIS — F41.8 DEPRESSION WITH ANXIETY: Primary | ICD-10-CM

## 2022-04-14 PROCEDURE — 90834 PSYTX W PT 45 MINUTES: CPT | Performed by: SOCIAL WORKER

## 2022-04-14 NOTE — PSYCH
7: 00am-7:45am      Virtual Regular Visit  Therapist met w/pt for individual session  Pt stated that she has had less "outbursts" these past few weeks  She stated that she feels as if the medication has helped her and she feels calmer overall  Pt shared that work continues to be stressful and at times gets a "knot" in her chest/chest pain  Therapist and pt discussed coping skills she implements when she feels this anxiety  Therapist suggested a few other strategies for pt to try to implement when this happens again including engaging in physical activity which has helped pt in the past       Verification of patient location:    Patient is located in the following state in which I hold an active license [place]      Assessment/Plan: f/u in three weeks    Problem List Items Addressed This Visit        Other    Depression with anxiety - Primary                   Reason for visit is No chief complaint on file  Encounter provider Tran Sathl    Provider located at [facility] [address]  [address]  [facility] [address] [place]      Recent Visits  No visits were found meeting these conditions  Showing recent visits within past 7 days and meeting all other requirements  Future Appointments  No visits were found meeting these conditions  Showing future appointments within next 150 days and meeting all other requirements       The patient was identified by name and date of birth  Janet Spanglersher was informed that this is a telemedicine visit and that the [facility] and patient was informed that this is a secure, HIPAA-compliant platform  She agrees to proceed     My office door was closed  No one else was in the room  She acknowledged consent and understanding of privacy and security of the video platform   The patient has agreed to participate and understands they can discontinue the visit at any time  Patient is aware this is a billable service  Subjective  Ledon Or is a 54 y o  female    HPI 45 minutes    Past Medical History:   Diagnosis Date    Cancer [facility])     [facility] [address] [address]    History of chemotherapy 2014    colon ca    History of radiation therapy 2014    colon ca    History of rectal cancer 2014    Locally advanced     History of tear of meniscus of knee joint     Hyperlipidemia     Tennis elbow        Past Surgical History:   Procedure Laterality Date    AUGMENTATION MAMMAPLASTY Bilateral 2005    BREAST SURGERY Bilateral     RECONSTRUCION WITH IMPLANT PROTHESIS     COLON SURGERY      COLONOSCOPY  04/2021    ELBOW SURGERY Left 2015    Tennis elbow    KNEE SURGERY      MENISCECTOMY Left 06/2016       Current Outpatient Medications   Medication Sig Dispense Refill    buPROPion (WELLBUTRIN SR) 150 mg 12 hr tablet Take 1 tablet (150 mg total) by mouth 2 (two) times a day (Patient taking differently: Take 150 mg by mouth 2 (two) times a day as needed If needed ) 180 tablet 1    dicyclomine (BENTYL) 10 mg capsule Take 1 capsule (10 mg total) by mouth 3 (three) times a day as needed (diarrhea and abdominal pain) 90 capsule 0    rosuvastatin (CRESTOR) 10 MG tablet Take 1 tablet (10 mg total) by mouth daily 90 tablet 1     No current facility-administered medications for this visit  Allergies   Allergen Reactions    Iodinated Diagnostic Agents        Review of Systems    Video Exam    There were no vitals filed for this visit  Physical Exam Pt denied any SI/HI/AH/Vh    I spent 45 minutes directly with the patient during this visit    VIRTUAL VISIT DISCLAIMER    Ledon Or verbally agrees to participate in [employer]   Pt is aware that [employer] could be limited without vital signs or the ability to perform a full hands-on physical Levern Roberto understands she or the provider may request at any time to terminate the video visit and request the patient to seek care or treatment in person

## 2022-04-21 ENCOUNTER — TELEPHONE (OUTPATIENT)
Dept: OTHER | Facility: OTHER | Age: 52
End: 2022-04-21

## 2022-04-21 NOTE — TELEPHONE ENCOUNTER
Pt called in stating she overslept and missed her appt for today at 7am  She is requesting a call back to reschedule

## 2022-05-02 ENCOUNTER — TELEMEDICINE (OUTPATIENT)
Dept: BEHAVIORAL/MENTAL HEALTH CLINIC | Facility: CLINIC | Age: 52
End: 2022-05-02
Payer: COMMERCIAL

## 2022-05-02 DIAGNOSIS — F41.9 ANXIETY: Primary | ICD-10-CM

## 2022-05-02 PROCEDURE — 90834 PSYTX W PT 45 MINUTES: CPT | Performed by: SOCIAL WORKER

## 2022-05-02 NOTE — PSYCH
7: 00am-7:45am    Virtual Regular Visit  Therapist met w/pt for individual session  Pt stated that her symptoms have been heightened and she walked out of work on Friday due to feeling so overwhelmed  Therapist and pt discussed how much of a stressor work has been  Therapist and pt discussed ways pt has been trying to work on problem solving this situation but it is affecting her life in so many different ways  Pt feels more irritable, on edge, tense, angry  Pt shared that therapist has been helpful for feels as if she needs something else to change  Therapist and pt discussed this in more detail and different options that pt can take to work on managing her symptoms more effectively  Verification of patient location:    Patient is located in the following state in which I hold an active license [place]      Assessment/Plan: Pt will reach out to provider to schedule f/u  Problem List Items Addressed This Visit        Other    Anxiety - Primary                   Reason for visit is No chief complaint on file  Encounter provider Carolina Borges    Provider located at [facility] [address]  [address]  [facility] [address] [place]      Recent Visits  No visits were found meeting these conditions  Showing recent visits within past 7 days and meeting all other requirements  Future Appointments  No visits were found meeting these conditions  Showing future appointments within next 150 days and meeting all other requirements       The patient was identified by name and date of birth  Willis Keane was informed that this is a telemedicine visit and that the [facility] and patient was informed that this is a secure, HIPAA-compliant platform  She agrees to proceed     My office door was closed  No one else was in the room    She acknowledged consent and understanding of privacy and security of the video platform  The patient has agreed to participate and understands they can discontinue the visit at any time  Patient is aware this is a billable service  Subjective  Lisa Villalba is a 54 y o  female    HPI 45 minutes    Past Medical History:   Diagnosis Date    Cancer [facility])     Colon cancer ([address] ) 2014    History of chemotherapy 2014    colon ca    History of radiation therapy 2014    colon ca    History of rectal cancer 2014    Locally advanced     History of tear of meniscus of knee joint     Hyperlipidemia     Tennis elbow        Past Surgical History:   Procedure Laterality Date    AUGMENTATION MAMMAPLASTY Bilateral 2005    BREAST SURGERY Bilateral     RECONSTRUCION WITH IMPLANT PROTHESIS     COLON SURGERY      COLONOSCOPY  04/2021    ELBOW SURGERY Left 2015    Tennis elbow    KNEE SURGERY      MENISCECTOMY Left 06/2016       Current Outpatient Medications   Medication Sig Dispense Refill    buPROPion (WELLBUTRIN SR) 150 mg 12 hr tablet Take 1 tablet (150 mg total) by mouth 2 (two) times a day (Patient taking differently: Take 150 mg by mouth 2 (two) times a day as needed If needed ) 180 tablet 1    dicyclomine (BENTYL) 10 mg capsule Take 1 capsule (10 mg total) by mouth 3 (three) times a day as needed (diarrhea and abdominal pain) 90 capsule 0    rosuvastatin (CRESTOR) 10 MG tablet Take 1 tablet (10 mg total) by mouth daily 90 tablet 1     No current facility-administered medications for this visit  Allergies   Allergen Reactions    Iodinated Diagnostic Agents        Review of Systems    Video Exam    There were no vitals filed for this visit  Physical Exam Pt denied any Si/HI/Ah/Vh    I spent 45 minutes directly with the patient during this visit    VIRTUAL VISIT DISCLAIMER    Lisa Villalba verbally agrees to participate in [employer]   Pt is aware that [employer] could be limited without vital signs or the ability to perform a full hands-on physical Jose Mills understands she or the provider may request at any time to terminate the video visit and request the patient to seek care or treatment in person

## 2022-05-11 ENCOUNTER — TELEMEDICINE (OUTPATIENT)
Dept: BEHAVIORAL/MENTAL HEALTH CLINIC | Facility: CLINIC | Age: 52
End: 2022-05-11
Payer: COMMERCIAL

## 2022-05-11 DIAGNOSIS — F41.9 ANXIETY: Primary | ICD-10-CM

## 2022-05-11 PROCEDURE — 90834 PSYTX W PT 45 MINUTES: CPT | Performed by: SOCIAL WORKER

## 2022-05-11 NOTE — PSYCH
7: 45am-8:30am    Virtual Regular Visit    Therapist met w/pt for individual session  Pt shared that she had a few meetings at work and developed some changes that are going to be made to alleviate some work stress  Pt shared that she took this week off with hopes that some of these changes will occur but if they dont then pt verbalized needing to take the next few months for her overall emotional stability  Pt shared that she has been enjoying her time off and been keeping busy but in a healthy productive way  Pt shared that she has been active and realizes that she needs that for her overall health  Pt shared that she knows when she returns back to work that she needs to get up and exercise at 6am so ensure she gets it done  Therapist and pt discussed importance of pt having a healthy outlet for the stress she endures from work  She reports a decrease in anxiety, racing thoughts, irritability, and hopelessness  Verification of patient location:    Patient is located in the following state in which I hold an active license [place]      Assessment/Plan: f/u in two weeks    Problem List Items Addressed This Visit        Other    Anxiety - Primary                   Reason for visit is No chief complaint on file  Encounter provider Maico Duran    Provider located at [facility] [address]  [address]  [facility] [address] [place]      Recent Visits  No visits were found meeting these conditions  Showing recent visits within past 7 days and meeting all other requirements  Future Appointments  No visits were found meeting these conditions  Showing future appointments within next 150 days and meeting all other requirements       The patient was identified by name and date of birth   Annette Voss was informed that this is a telemedicine visit and that the [facility] and patient was informed that this is a secure, HIPAA-compliant platform  She agrees to proceed     My office door was closed  No one else was in the room  She acknowledged consent and understanding of privacy and security of the video platform  The patient has agreed to participate and understands they can discontinue the visit at any time  Patient is aware this is a billable service  Subjective  Gosia Murrell is a 54 y o  female    HPI 45 minutes    Past Medical History:   Diagnosis Date    Cancer [facility])     Colon cancer ([address] ) 2014    History of chemotherapy 2014    colon ca    History of radiation therapy 2014    colon ca    History of rectal cancer 2014    Locally advanced     History of tear of meniscus of knee joint     Hyperlipidemia     Tennis elbow        Past Surgical History:   Procedure Laterality Date    AUGMENTATION MAMMAPLASTY Bilateral 2005    BREAST SURGERY Bilateral     RECONSTRUCION WITH IMPLANT PROTHESIS     COLON SURGERY      COLONOSCOPY  04/2021    ELBOW SURGERY Left 2015    Tennis elbow    KNEE SURGERY      MENISCECTOMY Left 06/2016       Current Outpatient Medications   Medication Sig Dispense Refill    buPROPion (WELLBUTRIN SR) 150 mg 12 hr tablet Take 1 tablet (150 mg total) by mouth 2 (two) times a day (Patient taking differently: Take 150 mg by mouth 2 (two) times a day as needed If needed ) 180 tablet 1    dicyclomine (BENTYL) 10 mg capsule Take 1 capsule (10 mg total) by mouth 3 (three) times a day as needed (diarrhea and abdominal pain) 90 capsule 0    rosuvastatin (CRESTOR) 10 MG tablet Take 1 tablet (10 mg total) by mouth daily 90 tablet 1     No current facility-administered medications for this visit  Allergies   Allergen Reactions    Iodinated Diagnostic Agents        Review of Systems    Video Exam    There were no vitals filed for this visit      Physical Exam Pt denied any SI/Hi/Ah/VH    I spent 45 minutes directly with the patient during this visit    VIRTUAL VISIT DISCLAIMER    Estrella Paget verbally agrees to participate in [employer]  Pt is aware that [employer] could be limited without vital signs or the ability to perform a full hands-on physical Simmie Dyers understands she or the provider may request at any time to terminate the video visit and request the patient to seek care or treatment in person

## 2022-05-26 ENCOUNTER — TELEMEDICINE (OUTPATIENT)
Dept: BEHAVIORAL/MENTAL HEALTH CLINIC | Facility: CLINIC | Age: 52
End: 2022-05-26
Payer: COMMERCIAL

## 2022-05-26 DIAGNOSIS — F41.9 ANXIETY: Primary | ICD-10-CM

## 2022-05-26 PROCEDURE — 90834 PSYTX W PT 45 MINUTES: CPT | Performed by: SOCIAL WORKER

## 2022-05-26 NOTE — PSYCH
7: 00am-7:45am    Virtual Regular Visit  Therapist met w/pt for individual session  Pt shared that she took another week off due to her heightened anxiety  She stated that most difficult times she has is when she is at work because she feels more "trapped and stuck "  Pt shared a few strategies she has been trying to implement to help manage her anxiety/anger  Therapist also educated pt on grounding skills and sent some for her to work on practicing  Therapist and pt discussed people pleasing and how pt can work on being more loyal to herself rather than everyone around her  Verification of patient location:    Patient is located in the following state in which I hold an active license [place]      Assessment/Plan: f/u in two weeks    Problem List Items Addressed This Visit        Other    Anxiety - Primary                 Reason for visit is No chief complaint on file  Encounter provider Leatha Gonzales    Provider located at [facility] [address]  [address]  [facility] [address] [place]      Recent Visits  No visits were found meeting these conditions  Showing recent visits within past 7 days and meeting all other requirements  Future Appointments  No visits were found meeting these conditions  Showing future appointments within next 150 days and meeting all other requirements       The patient was identified by name and date of birth  Saminaemmachadd Deejay was informed that this is a telemedicine visit and that the visit is being conducted through[facility] and patient was informed that this is a secure, HIPAA-compliant platform  She agrees to proceed     My office door was closed  No one else was in the room  She acknowledged consent and understanding of privacy and security of the video platform  The patient has agreed to participate and understands they can discontinue the visit at any time      Patient is aware this is a billable service  Subjective  Denisse Cantrell is a 54 y o  female    HPI 45 minutes    Past Medical History:   Diagnosis Date    Cancer [facility])     Colon cancer ([address] ) 2014    History of chemotherapy 2014    colon ca    History of radiation therapy 2014    colon ca    History of rectal cancer 2014    Locally advanced     History of tear of meniscus of knee joint     Hyperlipidemia     Tennis elbow        Past Surgical History:   Procedure Laterality Date    AUGMENTATION MAMMAPLASTY Bilateral 2005    BREAST SURGERY Bilateral     RECONSTRUCION WITH IMPLANT PROTHESIS     COLON SURGERY      COLONOSCOPY  04/2021    ELBOW SURGERY Left 2015    Tennis elbow    KNEE SURGERY      MENISCECTOMY Left 06/2016       Current Outpatient Medications   Medication Sig Dispense Refill    buPROPion (WELLBUTRIN SR) 150 mg 12 hr tablet Take 1 tablet (150 mg total) by mouth 2 (two) times a day (Patient taking differently: Take 150 mg by mouth 2 (two) times a day as needed If needed ) 180 tablet 1    dicyclomine (BENTYL) 10 mg capsule Take 1 capsule (10 mg total) by mouth 3 (three) times a day as needed (diarrhea and abdominal pain) 90 capsule 0    rosuvastatin (CRESTOR) 10 MG tablet Take 1 tablet (10 mg total) by mouth daily 90 tablet 1     No current facility-administered medications for this visit  Allergies   Allergen Reactions    Iodinated Diagnostic Agents        Review of Systems    Video Exam    There were no vitals filed for this visit  Physical Exam Pt denied any SI/HI/Ah/Vh    I spent 45 minutes directly with the patient during this visit    VIRTUAL VISIT DISCLAIMER    Denisse Canterll verbally agrees to participate in [employer]   Pt is aware that [employer] could be limited without vital signs or the ability to perform a full hands-on physical Lexi Ho understands she or the provider may request at any time to terminate the video visit and request the patient to seek care or treatment in person

## 2022-06-06 ENCOUNTER — OFFICE VISIT (OUTPATIENT)
Dept: FAMILY MEDICINE CLINIC | Facility: CLINIC | Age: 52
End: 2022-06-06
Payer: COMMERCIAL

## 2022-06-06 VITALS
TEMPERATURE: 97.9 F | HEART RATE: 83 BPM | SYSTOLIC BLOOD PRESSURE: 110 MMHG | OXYGEN SATURATION: 99 % | DIASTOLIC BLOOD PRESSURE: 70 MMHG | WEIGHT: 150.8 LBS | HEIGHT: 64 IN | BODY MASS INDEX: 25.74 KG/M2

## 2022-06-06 DIAGNOSIS — M79.605 LOWER EXTREMITY PAIN, LEFT: ICD-10-CM

## 2022-06-06 DIAGNOSIS — Z23 NEED FOR COVID-19 VACCINE: Primary | ICD-10-CM

## 2022-06-06 DIAGNOSIS — F41.9 ANXIETY: ICD-10-CM

## 2022-06-06 DIAGNOSIS — E78.5 HYPERLIPIDEMIA, UNSPECIFIED HYPERLIPIDEMIA TYPE: ICD-10-CM

## 2022-06-06 DIAGNOSIS — Z00.00 HEALTHCARE MAINTENANCE: ICD-10-CM

## 2022-06-06 DIAGNOSIS — Z00.00 ANNUAL PHYSICAL EXAM: ICD-10-CM

## 2022-06-06 PROBLEM — J02.9 PHARYNGITIS: Status: RESOLVED | Noted: 2020-01-06 | Resolved: 2022-06-06

## 2022-06-06 PROBLEM — M25.552 LEFT HIP PAIN: Status: RESOLVED | Noted: 2017-05-02 | Resolved: 2022-06-06

## 2022-06-06 PROCEDURE — 91305 PR SARSCOV2 VACCINE 30MCG/0.3ML TRIS-SUCROSE IM USE: CPT | Performed by: NURSE PRACTITIONER

## 2022-06-06 PROCEDURE — 3008F BODY MASS INDEX DOCD: CPT | Performed by: NURSE PRACTITIONER

## 2022-06-06 PROCEDURE — 0054A PR IMM ADMN SARSCOV2 30MCG/0.3ML TRIS-SUCROSE BST: CPT | Performed by: NURSE PRACTITIONER

## 2022-06-06 PROCEDURE — 99396 PREV VISIT EST AGE 40-64: CPT | Performed by: NURSE PRACTITIONER

## 2022-06-06 RX ORDER — BUPROPION HYDROCHLORIDE 150 MG/1
150 TABLET, EXTENDED RELEASE ORAL 2 TIMES DAILY
Qty: 180 TABLET | Refills: 1
Start: 2022-06-06 | End: 2022-06-20

## 2022-06-06 NOTE — LETTER
June 6, 2022     Patient: Kale Macias  YOB: 1970  Date of Visit: 6/6/2022      To Whom it May Concern:    Kale Macias is under my professional care  Jannie Hinton was seen in my office on 6/6/2022  Due to Aruna's medical conditions of Generalized Anxiety Disorder and Irritable Bowel Syndrome, I think it best for her to work remotely  If you have any questions or concerns, please don't hesitate to call           Sincerely,          NITESH Juarez        CC: No Recipients

## 2022-06-06 NOTE — PATIENT INSTRUCTIONS

## 2022-06-06 NOTE — ASSESSMENT & PLAN NOTE
Patient recently restarted wellbutrin  Continue therapy  Advised to make appointment if wellbutrin is not cutting it for her anxiety

## 2022-06-09 ENCOUNTER — TELEMEDICINE (OUTPATIENT)
Dept: BEHAVIORAL/MENTAL HEALTH CLINIC | Facility: CLINIC | Age: 52
End: 2022-06-09
Payer: COMMERCIAL

## 2022-06-09 DIAGNOSIS — F41.9 ANXIETY: Primary | ICD-10-CM

## 2022-06-09 PROCEDURE — 90834 PSYTX W PT 45 MINUTES: CPT | Performed by: SOCIAL WORKER

## 2022-06-09 NOTE — PSYCH
7: 00am-7:45am    Virtual Regular Visit  Therapist met w/pt for individual session  She stated she recently saw PCP and stated that she was given a letter so that she can work from home  Pt stated that she has been having GI issues which have made it very difficult to be at work  Therapist and pt discussed that she continues to work on setting healthy boundaries w/herself and not work past her time which has been effective in some ways  Pt stated she does have anxiety about her work piling up but she knows that this is best for her  Therapist and pt discussed other strategies she can work on implementing to help manage her symptoms  Pt stated she needs to get back into working out  Verification of patient location:    Patient is located in the following state in which I hold an active license [place]      Assessment/Plan: f/u in two weeks    Problem List Items Addressed This Visit        Other    Anxiety - Primary                   Reason for visit is No chief complaint on file  Encounter provider Russell Isaac    Provider located at [facility] [address]  [address]  [facility] [address] [place]      Recent Visits  Date Type Provider Dept   06/06/22 Office Visit [address] [facility], CRNP Chris Vigil 8 recent visits within past 7 days and meeting all other requirements  Future Appointments  No visits were found meeting these conditions  Showing future appointments within next 150 days and meeting all other requirements       The patient was identified by name and date of birth  Marina Dave was informed that this is a telemedicine visit and that the [facility] and patient was informed this is a secure, HIPAA-complaint platform  She agrees to proceed     My office door was closed  No one else was in the room    She acknowledged consent and understanding of privacy and security of the video platform  The patient has agreed to participate and understands they can discontinue the visit at any time  Patient is aware this is a billable service  Subjective  Sonja Rutledge is a 54 y o  female    HPI  45 minutes    Past Medical History:   Diagnosis Date    Cancer [facility])     Colon cancer ([address] ) 2014    History of chemotherapy 2014    colon ca    History of radiation therapy 2014    colon ca    History of rectal cancer 2014    Locally advanced     History of tear of meniscus of knee joint     Hyperlipidemia     Tennis elbow        Past Surgical History:   Procedure Laterality Date    AUGMENTATION MAMMAPLASTY Bilateral 2005    BREAST SURGERY Bilateral     RECONSTRUCION WITH IMPLANT PROTHESIS     COLON SURGERY      COLONOSCOPY  04/2021    ELBOW SURGERY Left 2015    Tennis elbow    KNEE SURGERY      MENISCECTOMY Left 06/2016       Current Outpatient Medications   Medication Sig Dispense Refill    buPROPion (WELLBUTRIN SR) 150 mg 12 hr tablet Take 1 tablet (150 mg total) by mouth 2 (two) times a day 180 tablet 1    dicyclomine (BENTYL) 10 mg capsule Take 1 capsule (10 mg total) by mouth 3 (three) times a day as needed (diarrhea and abdominal pain) 90 capsule 0    rosuvastatin (CRESTOR) 10 MG tablet Take 1 tablet (10 mg total) by mouth daily 90 tablet 1     No current facility-administered medications for this visit  Allergies   Allergen Reactions    Iodinated Diagnostic Agents        Review of Systems    Video Exam    There were no vitals filed for this visit  Physical Exam Pt denied any SI/HI/Ah/Vh    I spent 45 minutes directly with the patient during this visit    VIRTUAL VISIT DISCLAIMER    Sonja Rutledge verbally agrees to participate in [employer]   Pt is aware that [employer] could be limited without vital signs or the ability to perform a full hands-on physical Clay Rife understands she or the provider may request at any time to terminate the video visit and request the patient to seek care or treatment in person

## 2022-06-20 DIAGNOSIS — E78.5 HYPERLIPIDEMIA, UNSPECIFIED HYPERLIPIDEMIA TYPE: ICD-10-CM

## 2022-06-20 RX ORDER — BUPROPION HYDROCHLORIDE 150 MG/1
TABLET, EXTENDED RELEASE ORAL
Qty: 180 TABLET | Refills: 1 | Status: SHIPPED | OUTPATIENT
Start: 2022-06-20

## 2022-06-20 RX ORDER — ROSUVASTATIN CALCIUM 10 MG/1
TABLET, COATED ORAL
Qty: 90 TABLET | Refills: 1 | Status: SHIPPED | OUTPATIENT
Start: 2022-06-20

## 2022-06-24 ENCOUNTER — TELEMEDICINE (OUTPATIENT)
Dept: BEHAVIORAL/MENTAL HEALTH CLINIC | Facility: CLINIC | Age: 52
End: 2022-06-24
Payer: COMMERCIAL

## 2022-06-24 DIAGNOSIS — F41.8 DEPRESSION WITH ANXIETY: Primary | ICD-10-CM

## 2022-06-24 PROCEDURE — 90834 PSYTX W PT 45 MINUTES: CPT | Performed by: SOCIAL WORKER

## 2022-06-24 NOTE — PSYCH
7: 00am-7:45am    Virtual Regular Visit  Therapist met w/pt for individual session  Pt shared that she has improved symptoms  Pt reported less anxious thoughts and less irritability  She reports not going into the office is good overall for her MH  She identified stressors with going into the office not just emotionally but physically due to the cancer she had  Pt shared that she has been trying to exercise and engage in self care which has been helpful  Pt also identified that she has been working on setting better boundaries w/others which she knows has been helpful for her overall MH  Verification of patient location:    Patient is located in the following state in which I hold an active license [place]      Assessment/Plan: f/u in three weeks    Problem List Items Addressed This Visit        Other    Depression with anxiety - Primary                 Reason for visit is No chief complaint on file  Encounter provider Lucinda Bejarano    Provider located at [facility] [address]  [address]  [facility] [address] [place]      Recent Visits  No visits were found meeting these conditions  Showing recent visits within past 7 days and meeting all other requirements  Future Appointments  No visits were found meeting these conditions  Showing future appointments within next 150 days and meeting all other requirements       The patient was identified by name and date of birth  Ledon Or was informed that this is a telemedicine visit and that the visit is being conducted [facility] and patient was informed that this is a secure, HIPAA-compliant platform  She agrees to proceed     My office door was closed  No one else was in the room  She acknowledged consent and understanding of privacy and security of the video platform   The patient has agreed to participate and understands they can discontinue the visit at any time  Patient is aware this is a billable service  Subjective  Enid Mcallister is a 54 y o  female    HPI  45 minutes    Past Medical History:   Diagnosis Date    Cancer [facility])     [facility] [address] [facility] [address]    History of chemotherapy 2014    colon ca    History of radiation therapy 2014    colon ca    History of rectal cancer 2014    Locally advanced     History of tear of meniscus of knee joint     Hyperlipidemia     Tennis elbow        Past Surgical History:   Procedure Laterality Date    AUGMENTATION MAMMAPLASTY Bilateral 2005    BREAST SURGERY Bilateral     RECONSTRUCION WITH IMPLANT PROTHESIS     COLON SURGERY      COLONOSCOPY  04/2021    ELBOW SURGERY Left 2015    Tennis elbow    KNEE SURGERY      MENISCECTOMY Left 06/2016       Current Outpatient Medications   Medication Sig Dispense Refill    buPROPion (WELLBUTRIN SR) 150 mg 12 hr tablet TAKE 1 TABLET BY MOUTH TWICE A  tablet 1    dicyclomine (BENTYL) 10 mg capsule Take 1 capsule (10 mg total) by mouth 3 (three) times a day as needed (diarrhea and abdominal pain) 90 capsule 0    rosuvastatin (CRESTOR) 10 MG tablet TAKE 1 TABLET BY MOUTH EVERY DAY 90 tablet 1     No current facility-administered medications for this visit  Allergies   Allergen Reactions    Iodinated Diagnostic Agents        Review of Systems    Video Exam    There were no vitals filed for this visit  Physical Exam Pt denied any SI/HI/Ah/Vh    I spent 45 minutes directly with the patient during this visit    VIRTUAL VISIT DISCLAIMER    Enid Mcallister verbally agrees to participate in [employer]  Pt is aware that [employer] could be limited without vital signs or the ability to perform a full hands-on physical Hodan Velasco understands she or the provider may request at any time to terminate the video visit and request the patient to seek care or treatment in person

## 2022-07-15 NOTE — PROGRESS NOTES
Left message for patient to call back. Virtual Regular Visit      Assessment/Plan:    Problem List Items Addressed This Visit        Musculoskeletal and Integument    Drug-related hair loss       Other    Hyperlipidemia    Depression with anxiety - Primary    Relevant Medications    buPROPion (WELLBUTRIN XL) 150 mg 24 hr tablet    traZODone (DESYREL) 50 mg tablet      Other Visit Diagnoses     Insomnia, unspecified type        Relevant Medications    traZODone (DESYREL) 50 mg tablet               Reason for visit is   Chief Complaint   Patient presents with    Virtual Regular Visit        Encounter provider Andrés Bedoya, [address] [place] [address]    Provider located at [facility] [address] [place]  [address] [place] [id number] [phone]      Recent Visits  No visits were found meeting these conditions  Showing recent visits within past 7 days and meeting all other requirements     Today's Visits  Date Type Provider Dept   09/22/20 Telemedicine Becky Wright, [address] today's visits and meeting all other requirements     Future Appointments  No visits were found meeting these conditions  Showing future appointments within next 150 days and meeting all other requirements        The patient was identified by name and date of birth  Chiki Hewitt was informed that this is a telemedicine visit and that the visit is being conducted through [employer] and patient was informed that this is not a secure, HIPAA-complaint platform  She agrees to proceed     My office door was closed  No one else was in the room  She acknowledged consent and understanding of privacy and security of the video platform  The patient has agreed to participate and understands they can discontinue the visit at any time  Patient is aware this is a billable service  Subjective  Chiki Hewitt is a 54 y o  female  Pt was called for f/u for concerns re: her prozac  Was on wellbutrin and thought it was making her lose her hair and now on prozac and not sleeping and more anxious and nervous  Thinks she should go back on the lowest possible dosage of wellbutrin  Still losing her hair and her thyroid was checked  Feels she is under a lot of stress in her dysfunctional family  Works too much and needs sleep  Would rather wear a wig  Came off the lipitor and did not have her cholesterol checked yet  Feels her insomnia is making her more anxious  Would like to come off the fluoxetine  Reviewed meds and current  Takes all other meds as directed  No side effects noted  Past Medical History:   Diagnosis Date    Cancer [facility])     Colon cancer ([facility] [address] ) 2014    History of chemotherapy 2014    colon ca    History of radiation therapy 2014    colon ca    History of tear of meniscus of knee joint     Hyperlipidemia     Tennis elbow        Past Surgical History:   Procedure Laterality Date    BREAST SURGERY Bilateral     RECONSTRUCION WITH IMPLANT PROTHESIS     COLON SURGERY      ELBOW SURGERY Left 2015    Tennis elbow    KNEE SURGERY      MENISCECTOMY Left 06/2016       Current Outpatient Medications   Medication Sig Dispense Refill    buPROPion (WELLBUTRIN XL) 150 mg 24 hr tablet Take 1 tablet (150 mg total) by mouth every morning 30 tablet 1    traZODone (DESYREL) 50 mg tablet Take 1/2 to 1 po hs prn insomnia 30 tablet 0     No current facility-administered medications for this visit  Allergies   Allergen Reactions    Iodinated Diagnostic Agents        Review of Systems   Constitutional: Negative for chills, diaphoresis, fatigue and fever  HENT: Negative  Eyes: Negative  Respiratory: Negative for cough, shortness of breath and wheezing  Cardiovascular: Negative for chest pain and palpitations  Gastrointestinal: Negative  Genitourinary: Negative           Medical menopause s/p chemo    Neurological: Negative for dizziness, light-headedness and headaches  Psychiatric/Behavioral: Positive for dysphoric mood (mild) and sleep disturbance  Negative for suicidal ideas  The patient is nervous/anxious  Video Exam    There were no vitals filed for this visit  Physical Exam  Constitutional:       General: She is not in acute distress  Appearance: Normal appearance  She is well-developed  She is not ill-appearing, toxic-appearing or diaphoretic  HENT:      Head: Normocephalic and atraumatic  Right Ear: Tympanic membrane, ear canal and external ear normal  There is no impacted cerumen  Left Ear: Tympanic membrane, ear canal and external ear normal  There is no impacted cerumen  Nose: Nose normal  No congestion  Mouth/Throat:      Mouth: Mucous membranes are moist       Pharynx: No oropharyngeal exudate  Eyes:      General:         Right eye: No discharge  Left eye: No discharge  Extraocular Movements: Extraocular movements intact  Conjunctiva/sclera: Conjunctivae normal       Pupils: Pupils are equal, round, and reactive to light  Neck:      Musculoskeletal: Normal range of motion and neck supple  No neck rigidity  Cardiovascular:      Rate and Rhythm: Normal rate and regular rhythm  Heart sounds: No murmur  Pulmonary:      Effort: Pulmonary effort is normal  No respiratory distress  Breath sounds: Normal breath sounds  Musculoskeletal: Normal range of motion  General: No deformity  Right lower leg: No edema  Left lower leg: No edema  Skin:     General: Skin is warm  Capillary Refill: Capillary refill takes less than 2 seconds  Coloration: Skin is not pale  Findings: No erythema  Neurological:      General: No focal deficit present  Mental Status: She is alert and oriented to person, place, and time  Psychiatric:         Mood and Affect: Mood normal          Behavior: Behavior normal          Thought Content:  Thought content normal  Judgment: Judgment normal           I spent 15 minutes directly with the patient during this visit      VIRTUAL VISIT DISCLAIMER    Manuela King acknowledges that she has consented to an online visit or consultation  She understands that the online visit is based solely on information provided by her, and that, in the absence of a face-to-face physical evaluation by the physician, the diagnosis she receives is both limited and provisional in terms of accuracy and completeness  This is not intended to replace a full medical face-to-face evaluation by the physician  Manuela Malik understands and accepts these terms

## 2022-07-21 ENCOUNTER — TELEMEDICINE (OUTPATIENT)
Dept: BEHAVIORAL/MENTAL HEALTH CLINIC | Facility: CLINIC | Age: 52
End: 2022-07-21
Payer: COMMERCIAL

## 2022-07-21 DIAGNOSIS — F41.9 ANXIETY: Primary | ICD-10-CM

## 2022-07-21 PROCEDURE — 90834 PSYTX W PT 45 MINUTES: CPT | Performed by: SOCIAL WORKER

## 2022-07-21 NOTE — PSYCH
7: 45am-8:30am    Virtual Regular Visit  Therapist met w/pt for individual session  Pts symptoms include: irritability, anxiety, stomach issues, depression  Therapist and pt discussed that she enjoyed her week off from work but as soon as she returned she felt stressed and doesn't feel like she has a good quality of life  Pt reports working long hours and trying to set boundaries but work continues to pile up and she feels stuck  Therapist and pt discussed ways she can continue to work on managing her symptoms and also discussed the possibility of going on disability due to heightened ongoing symptoms  Verification of patient location:    Patient is located in the following state in which I hold an active license [place]      Assessment/Plan: f/u in one week    Problem List Items Addressed This Visit        Other    Anxiety - Primary                   Reason for visit is No chief complaint on file  Encounter provider Lottie Umana    Provider located at [facility] [address]  [address]  [facility] [address] [place]      Recent Visits  No visits were found meeting these conditions  Showing recent visits within past 7 days and meeting all other requirements  Future Appointments  No visits were found meeting these conditions  Showing future appointments within next 150 days and meeting all other requirements       The patient was identified by name and date of birth  Sergio Tiradoradames was informed that this is a telemedicine visit and that the visit is being conducted through[employer] and patient was informed that this is a secure, HIPAA-compliant platform  She agrees to proceed     My office door was closed  No one else was in the room  She acknowledged consent and understanding of privacy and security of the video platform   The patient has agreed to participate and understands they can discontinue the visit at any time  Patient is aware this is a billable service  Subjective  Jemma Burrell is a 54 y o  female    HPI  45 minutes    Past Medical History:   Diagnosis Date    Cancer [facility])     Colon cancer ([address] ) 2014    History of chemotherapy 2014    colon ca    History of radiation therapy 2014    colon ca    History of rectal cancer 2014    Locally advanced     History of tear of meniscus of knee joint     Hyperlipidemia     Tennis elbow        Past Surgical History:   Procedure Laterality Date    AUGMENTATION MAMMAPLASTY Bilateral 2005    BREAST SURGERY Bilateral     RECONSTRUCION WITH IMPLANT PROTHESIS     COLON SURGERY      COLONOSCOPY  04/2021    ELBOW SURGERY Left 2015    Tennis elbow    KNEE SURGERY      MENISCECTOMY Left 06/2016       Current Outpatient Medications   Medication Sig Dispense Refill    buPROPion (WELLBUTRIN SR) 150 mg 12 hr tablet TAKE 1 TABLET BY MOUTH TWICE A  tablet 1    dicyclomine (BENTYL) 10 mg capsule Take 1 capsule (10 mg total) by mouth 3 (three) times a day as needed (diarrhea and abdominal pain) 90 capsule 0    rosuvastatin (CRESTOR) 10 MG tablet TAKE 1 TABLET BY MOUTH EVERY DAY 90 tablet 1     No current facility-administered medications for this visit  Allergies   Allergen Reactions    Iodinated Diagnostic Agents        Review of Systems    Video Exam    There were no vitals filed for this visit  Physical Exam pt denied any SI/HI    I spent 45 minutes directly with the patient during this visit    VIRTUAL VISIT DISCLAIMER    Jemma Burrell verbally agrees to participate in [employer]  Pt is aware that [employer] could be limited without vital signs or the ability to perform a full hands-on physical Cuong Trevizo understands she or the provider may request at any time to terminate the video visit and request the patient to seek care or treatment in person

## 2022-07-28 ENCOUNTER — TELEMEDICINE (OUTPATIENT)
Dept: BEHAVIORAL/MENTAL HEALTH CLINIC | Facility: CLINIC | Age: 52
End: 2022-07-28
Payer: COMMERCIAL

## 2022-07-28 DIAGNOSIS — F41.8 DEPRESSION WITH ANXIETY: Primary | ICD-10-CM

## 2022-07-28 PROCEDURE — 90834 PSYTX W PT 45 MINUTES: CPT | Performed by: SOCIAL WORKER

## 2022-07-28 NOTE — PSYCH
7: 00am-7:45am    Virtual Regular Visit  Therapist met w/pt for individual session  Pt is experiencing heightened symptoms  Therapist recommended that pt go out on disability due to her ongoing heightened symptoms of both anxiety and physical symptoms  Symptoms include: restlessness, irritability, sadness, hopelessness, racing thoughts  Pt is experiencing physical symptoms as well and continues to lose weight  Pt appears disheveled and unkept  Pt has been having sleep disturbances and her speech was tangential due to racing thoughts  Verification of patient location:    Patient is located in the following state in which I hold an active license [place]      Assessment/Plan: f/u in one week    Problem List Items Addressed This Visit        Other    Depression with anxiety - Primary                 Reason for visit is No chief complaint on file  Encounter provider Lucy Mccullough    Provider located at [facility] [address]  [address]  [facility] [address] [place]      Recent Visits  Date Type Provider Dept   07/21/22 [place] recent visits within past 7 days and meeting all other requirements  Today's Visits  Date Type Provider Dept   07/28/22 Telemedicine [address] today's visits and meeting all other requirements  Future Appointments  No visits were found meeting these conditions  Showing future appointments within next 150 days and meeting all other requirements       The patient was identified by name and date of birth  Mikayla Khan was informed that this is a telemedicine visit and that the [facility] and patient was informed that this is a secure, HIPAA-compliant platform  She agrees to proceed     My office door was closed   No one else was in the room  She acknowledged consent and understanding of privacy and security of the video platform  The patient has agreed to participate and understands they can discontinue the visit at any time  Patient is aware this is a billable service  Subjective  Sergio Burgos is a 54 y o  female    HPI 45 minutes    Past Medical History:   Diagnosis Date    Cancer [facility])     Colon cancer ([address] ) 2014    History of chemotherapy 2014    colon ca    History of radiation therapy 2014    colon ca    History of rectal cancer 2014    Locally advanced     History of tear of meniscus of knee joint     Hyperlipidemia     Tennis elbow        Past Surgical History:   Procedure Laterality Date    AUGMENTATION MAMMAPLASTY Bilateral 2005    BREAST SURGERY Bilateral     RECONSTRUCION WITH IMPLANT PROTHESIS     COLON SURGERY      COLONOSCOPY  04/2021    ELBOW SURGERY Left 2015    Tennis elbow    KNEE SURGERY      MENISCECTOMY Left 06/2016       Current Outpatient Medications   Medication Sig Dispense Refill    buPROPion (WELLBUTRIN SR) 150 mg 12 hr tablet TAKE 1 TABLET BY MOUTH TWICE A  tablet 1    dicyclomine (BENTYL) 10 mg capsule Take 1 capsule (10 mg total) by mouth 3 (three) times a day as needed (diarrhea and abdominal pain) 90 capsule 0    rosuvastatin (CRESTOR) 10 MG tablet TAKE 1 TABLET BY MOUTH EVERY DAY 90 tablet 1     No current facility-administered medications for this visit  Allergies   Allergen Reactions    Iodinated Diagnostic Agents        Review of Systems    Video Exam    There were no vitals filed for this visit  Physical Exam Pt denied any SI/HI/AH/VH    I spent 45 minutes directly with the patient during this visit    VIRTUAL VISIT DISCLAIMER    Sergio Burgos verbally agrees to participate in [employer]   Pt is aware that [employer] could be limited without vital signs or the ability to perform a full hands-on physical Riosbiju Ventura understands she or the provider may request at any time to terminate the video visit and request the patient to seek care or treatment in person

## 2022-08-03 ENCOUNTER — TELEMEDICINE (OUTPATIENT)
Dept: BEHAVIORAL/MENTAL HEALTH CLINIC | Facility: CLINIC | Age: 52
End: 2022-08-03
Payer: COMMERCIAL

## 2022-08-03 DIAGNOSIS — F41.8 DEPRESSION WITH ANXIETY: Primary | ICD-10-CM

## 2022-08-03 PROCEDURE — 90834 PSYTX W PT 45 MINUTES: CPT | Performed by: SOCIAL WORKER

## 2022-08-03 NOTE — PSYCH
10:30am-11:15am    Virtual Regular Visit  Therapist met w/pt for individual session  Pt stated that she went back to work on Monday and really needs to figure out how to make it work  Pt expressed heightened anxiety symptoms as well as physical symptoms  Therapist and pt discussed different strategies to work on implementing to help her manage her symptoms  therapist and pt discussed working from home is the best for her right now giving her emotional state  Therapist and pt discussed reevaluating this in a few months to determine if she is ready to return back  Verification of patient location:    Patient is located in the following state in which I hold an active license [place]      Assessment/Plan: f/u in one week    Problem List Items Addressed This Visit        Other    Depression with anxiety - Primary                   Reason for visit is No chief complaint on file  Encounter provider Lashawn Phillips    Provider located at [facility] [address]  [address]  [facility] [address] [place]      Recent Visits  Date Type Provider Dept   07/28/22 Telemedicine [address] recent visits within past 7 days and meeting all other requirements  Future Appointments  No visits were found meeting these conditions  Showing future appointments within next 150 days and meeting all other requirements       The patient was identified by name and date of birth  Nora Jump was informed that this is a telemedicine visit and that the [facility] and patient was informed that this is a secure, HIPAA-compliant platform  She agrees to proceed     My office door was closed  No one else was in the room  She acknowledged consent and understanding of privacy and security of the video platform   The patient has agreed to participate and understands they can discontinue the visit at any time  Patient is aware this is a billable service  Subjective  Luciana Linton is a 54 y o  female    HPI  45minutes   Past Medical History:   Diagnosis Date    Cancer [facility])     Colon cancer ([address] ) 2014    History of chemotherapy 2014    colon ca    History of radiation therapy 2014    colon ca    History of rectal cancer 2014    Locally advanced     History of tear of meniscus of knee joint     Hyperlipidemia     Tennis elbow        Past Surgical History:   Procedure Laterality Date    AUGMENTATION MAMMAPLASTY Bilateral 2005    BREAST SURGERY Bilateral     RECONSTRUCION WITH IMPLANT PROTHESIS     COLON SURGERY      COLONOSCOPY  04/2021    ELBOW SURGERY Left 2015    Tennis elbow    KNEE SURGERY      MENISCECTOMY Left 06/2016       Current Outpatient Medications   Medication Sig Dispense Refill    buPROPion (WELLBUTRIN SR) 150 mg 12 hr tablet TAKE 1 TABLET BY MOUTH TWICE A  tablet 1    dicyclomine (BENTYL) 10 mg capsule Take 1 capsule (10 mg total) by mouth 3 (three) times a day as needed (diarrhea and abdominal pain) 90 capsule 0    rosuvastatin (CRESTOR) 10 MG tablet TAKE 1 TABLET BY MOUTH EVERY DAY 90 tablet 1     No current facility-administered medications for this visit  Allergies   Allergen Reactions    Iodinated Diagnostic Agents        Review of Systems    Video Exam    There were no vitals filed for this visit  Physical Exam  Pt denied any SI/HI/AH/VH    I spent 45 minutes directly with the patient during this visit    VIRTUAL VISIT DISCLAIMER    Luciana Linton verbally agrees to participate in [employer]  Pt is aware that [employer] could be limited without vital signs or the ability to perform a full hands-on physical Walter Irvin understands she or the provider may request at any time to terminate the video visit and request the patient to seek care or treatment in person

## 2022-08-08 ENCOUNTER — TELEPHONE (OUTPATIENT)
Dept: FAMILY MEDICINE CLINIC | Facility: CLINIC | Age: 52
End: 2022-08-08

## 2022-08-08 ENCOUNTER — ANNUAL EXAM (OUTPATIENT)
Dept: OBGYN CLINIC | Facility: CLINIC | Age: 52
End: 2022-08-08
Payer: COMMERCIAL

## 2022-08-08 VITALS
DIASTOLIC BLOOD PRESSURE: 82 MMHG | BODY MASS INDEX: 25.95 KG/M2 | WEIGHT: 152 LBS | HEIGHT: 64 IN | SYSTOLIC BLOOD PRESSURE: 116 MMHG

## 2022-08-08 DIAGNOSIS — Z01.419 ENCOUNTER FOR GYNECOLOGICAL EXAMINATION WITHOUT ABNORMAL FINDING: Primary | ICD-10-CM

## 2022-08-08 DIAGNOSIS — Z78.0 ASYMPTOMATIC POSTMENOPAUSAL STATUS: ICD-10-CM

## 2022-08-08 DIAGNOSIS — Z12.31 ENCOUNTER FOR SCREENING MAMMOGRAM FOR MALIGNANT NEOPLASM OF BREAST: ICD-10-CM

## 2022-08-08 DIAGNOSIS — Z80.3 FAMILY HISTORY OF BREAST CANCER IN FIRST DEGREE RELATIVE: ICD-10-CM

## 2022-08-08 PROBLEM — R45.4 DIFFICULTY CONTROLLING ANGER: Status: RESOLVED | Noted: 2019-08-13 | Resolved: 2022-08-08

## 2022-08-08 PROBLEM — M24.562: Status: ACTIVE | Noted: 2022-08-08

## 2022-08-08 PROBLEM — L71.9 ROSACEA: Status: ACTIVE | Noted: 2022-08-08

## 2022-08-08 PROCEDURE — 99396 PREV VISIT EST AGE 40-64: CPT | Performed by: NURSE PRACTITIONER

## 2022-08-08 PROCEDURE — 0503F POSTPARTUM CARE VISIT: CPT | Performed by: NURSE PRACTITIONER

## 2022-08-08 PROCEDURE — G0145 SCR C/V CYTO,THINLAYER,RESCR: HCPCS | Performed by: NURSE PRACTITIONER

## 2022-08-08 NOTE — PROGRESS NOTES
Diagnoses and all orders for this visit:    Encounter for gynecological examination without abnormal finding  -     Liquid-based pap, screening    Asymptomatic postmenopausal status    Family history of breast cancer in first degree relative  -     Mammo screening bilateral w 3d & cad; Future  -     US breast screening bilateral complete (ABUS); Future    Encounter for screening mammogram for malignant neoplasm of breast  -     Mammo screening bilateral w 3d & cad; Future        Call as needed, encouraged calcium/vit D in her diet, call with any PMB, all questions answered      Pleasant 54 y o  postmenopausal female here for annual exam  She denies any postmenopausal bleeding  EMB nondiagnostic but u/s nml in   LMP 2014 Medically induced menopause (Chemo/radiation)     Pap  Neg pap Neg HPV, requests a pap today, +history of abnormal pap smears  Mammo , + fam hx with mat aunts and her sister  Requests ABUS which she does as 6 mos f/u after her mammogram without any insurance issues  History of Colon Cancer Followed by [facility]  Denies vaginal issues  Denies pelvic pain  Denies postmenopausal issues  NOT sexually active anymore       Past Medical History:   Diagnosis Date    Cancer [facility])     Colon cancer ([facility] [address] ) 2014    History of chemotherapy 2014    colon ca    History of radiation therapy 2014    colon ca    History of rectal cancer     Locally advanced     History of tear of meniscus of knee joint     Hyperlipidemia     Tennis elbow      Past Surgical History:   Procedure Laterality Date    AUGMENTATION MAMMAPLASTY Bilateral     BREAST SURGERY Bilateral     RECONSTRUCION WITH IMPLANT PROTHESIS     COLON SURGERY      COLONOSCOPY  2021    ELBOW SURGERY Left 2015    Tennis elbow    KNEE SURGERY      MENISCECTOMY Left 2016     Family History   Problem Relation Age of Onset    Diabetes Mother     Heart failure Mother     Heart defect Father         CARDIAC PACEMAKER    Hip fracture Father         HIP REPLACEMENT    Breast cancer Sister 52    BRCA1 Negative Sister     BRCA2 Negative Sister     Lung cancer Maternal Grandmother 72    Breast cancer Maternal Aunt 46        again at 72    No Known Problems Sister     Colon cancer Maternal Aunt     No Known Problems Paternal Aunt     No Known Problems Paternal Aunt     No Known Problems Paternal Aunt     No Known Problems Paternal Aunt     No Known Problems Paternal Grandmother     Ovarian cancer Neg Hx     Uterine cancer Neg Hx     Cervical cancer Neg Hx     Endometrial cancer Neg Hx      Social History     Tobacco Use    Smoking status: Former Smoker     Types: Cigarettes     Quit date: 2007     Years since quitting: 15 6    Smokeless tobacco: Never Used   Vaping Use    Vaping Use: Never used   Substance Use Topics    Alcohol use:  Yes    Drug use: No       Current Outpatient Medications:     buPROPion (WELLBUTRIN SR) 150 mg 12 hr tablet, TAKE 1 TABLET BY MOUTH TWICE A DAY, Disp: 180 tablet, Rfl: 1    dicyclomine (BENTYL) 10 mg capsule, Take 1 capsule (10 mg total) by mouth 3 (three) times a day as needed (diarrhea and abdominal pain), Disp: 90 capsule, Rfl: 0    rosuvastatin (CRESTOR) 10 MG tablet, TAKE 1 TABLET BY MOUTH EVERY DAY, Disp: 90 tablet, Rfl: 1  Patient Active Problem List    Diagnosis Date Noted    Knee joint contracture, left 08/08/2022    Rosacea 08/08/2022    Diarrhea 02/22/2022    Family history of breast cancer in first degree relative 08/02/2021    Stage 3a chronic kidney disease ([facility] [address] ) 10/06/2020    Drug-related hair loss 08/03/2020    Depression with anxiety 08/03/2020    History of colon cancer 06/22/2020    Asymptomatic postmenopausal status 06/22/2020    Dense breast tissue 06/22/2020    Anxiety 08/13/2019    Lower extremity pain, left 12/12/2018    Tear of meniscus of left knee 01/22/2018    Malignant neoplasm of colon ([facility] [address] ) 05/02/2017    Early menopause occurring in patient age younger than 39 years 2017    Hyperlipidemia 2017    Trochanteric bursitis of left hip 2017    History of radiation therapy 2014    History of chemotherapy 2014       Allergies   Allergen Reactions    Iodinated Diagnostic Agents        OB History    Para Term  AB Living   1 1 1     1   SAB IAB Ectopic Multiple Live Births           1      # Outcome Date GA Lbr Issa/2nd Weight Sex Delivery Anes PTL Lv   1 Term              Has a 3 yr old granddaughter in [employer]  Works for [employer] wants to quit bc it is too stressful for her    Vitals:    22 0752   BP: 116/82   BP Location: Left arm   Patient Position: Sitting   Cuff Size: Standard   Weight: 68 9 kg (152 lb)   Height: 5' 4" (1 626 m)     Body mass index is 26 09 kg/m²  Review of Systems   Constitutional: Negative for chills, fatigue, fever  She has unexpected weight change of 20 pound loss  Will be seeing [facility]  Respiratory: Negative for shortness of breath  Gastrointestinal: Negative for anal bleeding, blood in stool, constipation, + RECENT diarrhea  ++Stress  Genitourinary: Negative for difficulty urinating, dysuria and hematuria  Physical Exam   Constitutional: She appears well-developed and well-nourished  No distress  HENT: atraumatic, EOMI  Head: Normocephalic  Neck: Normal range of motion  Neck supple  Pulmonary: Effort normal   Breasts: bilateral without masses, skin changes or nipple discharge  Bilaterally soft and warm to touch  No areas of erythema or pain  Bilateral Implants  Abdominal: Soft  Pelvic exam was performed with patient supine  No labial fusion  There is no rash, tenderness, lesion or injury on the right labia  There is no rash, tenderness, lesion or injury on the left labia  Urethral meatus does not show any tenderness, inflammation or discharge  Palpation of midline bladder without pain or discomfort   Uterus is not deviated, not enlarged, not fixed and not tender  Cervix exhibits no motion tenderness, no discharge and no friability  Right adnexum displays no mass, no tenderness and no fullness  Left adnexum displays no mass, no tenderness and no fullness  No erythema or tenderness in the vagina  No foreign body in the vagina  No signs of injury around the vagina or anus  Perineum without lesions, signs of injury, erythema or swelling  No vaginal discharge found  Lymphadenopathy:        Right: No inguinal adenopathy present  Left: No inguinal adenopathy present

## 2022-08-08 NOTE — PROGRESS NOTES
Patient presents for: yearly    Menarche- 14  Last Pap Smear- 06/17/2019  Hx of abnormal paps- yes   Birth control-    Mammogram- 10/08/2021  DXA- not on file  Colonoscopy- 4/2021  Smoking status- Former  Last sexual activity- not currently   Family history of uterine, ovarian, cervical or breast cancer-  Sister breast cancer maternal aunt breast cancer   Concerns- none at the time

## 2022-08-08 NOTE — PATIENT INSTRUCTIONS
Breast Self Exam for Women   AMBULATORY CARE:   A breast self-exam (BSE)  is a way to check your breasts for lumps and other changes  Regular BSEs can help you know how your breasts normally look and feel  Most breast lumps or changes are not cancer, but you should always have them checked by a healthcare provider  Why you should do a BSE:  Breast cancer is the most common type of cancer in women  Even if you have mammograms, you may still want to do a BSE regularly  If you know how your breasts normally feel and look, it may help you know when to contact your healthcare provider  Mammograms can miss some cancers  You may find a lump during a BSE that did not show up on a mammogram   When you should do a BSE:  If you have periods, you may want to do your BSE 1 week after your period ends  This is the time when your breasts may be the least swollen, lumpy, or tender  You can do regular BSEs even if you are breastfeeding or have breast implants  Call your doctor if:   You find any lumps or changes in your breasts  You have breast pain or fluid coming from your nipples  You have questions or concerns about your condition or care  How to do a BSE:       Look at your breasts in a mirror  Look at the size and shape of each breast and nipple  Check for swelling, lumps, dimpling, scaly skin, or other skin changes  Look for nipple changes, such as a nipple that is painful or beginning to pull inward  Gently squeeze both nipples and check to see if fluid (that is not breast milk) comes out of them  If you find any of these or other breast changes, contact your healthcare provider  Check your breasts while you sit or  the following 3 positions:    [place] your arms down at your sides  Raise your hands and join them behind your head  Put firm pressure with your hands on your hips  Bend slightly forward while you look at your breasts in the mirror  Lie down and feel your breasts    When you lie down, your breast tissue spreads out evenly over your chest  This makes it easier for you to feel for lumps and anything that may not be normal for your breasts  Do a BSE on one breast at a time  Place a small pillow or towel under your left shoulder  Put your left arm behind your head  Use the 3 middle fingers of your right hand  Use your fingertip pads, on the top of your fingers  Your fingertip pad is the most sensitive part of your finger  Use small circles to feel your breast tissue  Use your fingertip pads to make dime-sized, overlapping circles on your breast and armpits  Use light, medium, and firm pressure  First, press lightly  Second, press with medium pressure to feel a little deeper into the breast  Last, use firm pressure to feel deep within your breast     Examine your entire breast area  Examine the breast area from above the breast to below the breast where you feel only ribs  Make small circles with your fingertips, starting in the middle of your armpit  Make circles going up and down the breast area  Continue toward your breast and all the way across it  Examine the area from your armpit all the way over to the middle of your chest (breastbone)  Stop at the middle of your chest     Move the pillow or towel to your right shoulder, and put your right arm behind your head  Use the 3 fingertip pads of your left hand, and repeat the above steps to do a BSE on your right breast     What else you can do to check for breast problems or cancer:  Talk to your healthcare provider about mammograms  A mammogram is an x-ray of your breasts to screen for breast cancer or other problems  Your provider can tell you the benefits and risks of mammograms  The first mammogram is usually at age 39 or 48  Your provider may recommend you start at 36 or younger if your risk for breast cancer is high  Mammograms usually continue every 1 to 2 years until age 76         Follow up with your doctor as directed:  Write down your questions so you remember to ask them during your visits  © Copyright "[employer]" 2022 Information is for End User's use only and may not be sold, redistributed or otherwise used for commercial purposes  All illustrations and images included in CareNotes® are the copyrighted property of A D A M , Inc  or Facundo Prakash  The above information is an  only  It is not intended as medical advice for individual conditions or treatments  Talk to your doctor, nurse or pharmacist before following any medical regimen to see if it is safe and effective for you

## 2022-08-11 ENCOUNTER — TELEMEDICINE (OUTPATIENT)
Dept: BEHAVIORAL/MENTAL HEALTH CLINIC | Facility: CLINIC | Age: 52
End: 2022-08-11
Payer: COMMERCIAL

## 2022-08-11 DIAGNOSIS — F41.8 DEPRESSION WITH ANXIETY: Primary | ICD-10-CM

## 2022-08-11 PROCEDURE — 90834 PSYTX W PT 45 MINUTES: CPT | Performed by: SOCIAL WORKER

## 2022-08-11 NOTE — PSYCH
7: 00am-7:45am    Virtual Regular Visit  Therapist met w/pt for individual session  Symptoms include: sadness, racing thoughts, irritability, stomach issues  Pt shared that she is "just tired "  Pt expressed frustration that she is always talking about work  Therapist and pt discussed other things that have triggered her this week  Discussion was held around where the trigger came from and also how to continue to work on coping with her feelings when she is triggered  Verification of patient location:    Patient is located in the following state in which I hold an active license [place]      Assessment/Plan: f/u in one week  Problem List Items Addressed This Visit    None                Reason for visit is No chief complaint on file  Encounter provider Shantelle Rudd    Provider located at [facility] [address]  [address]  [facility] [address] [place]      Recent Visits  No visits were found meeting these conditions  Showing recent visits within past 7 days and meeting all other requirements  Future Appointments  No visits were found meeting these conditions  Showing future appointments within next 150 days and meeting all other requirements       The patient was identified by name and date of birth  Han Galvan was informed that this is a telemedicine visit and that the visit is being conducted through[employer] and patient was informed that this is a secure, HIPAA-compliant platform  She agrees to proceed     My office door was closed  No one else was in the room  She acknowledged consent and understanding of privacy and security of the video platform  The patient has agreed to participate and understands they can discontinue the visit at any time  Patient is aware this is a billable service  Subjective  Han Galvan is a 54 y o  female          HPI  45 minutes    Past Medical History: Diagnosis Date    Cancer [facility])     Colon cancer ([address] ) 2014    History of chemotherapy 2014    colon ca    History of radiation therapy 2014    colon ca    History of rectal cancer 2014    Locally advanced     History of tear of meniscus of knee joint     Hyperlipidemia     Tennis elbow        Past Surgical History:   Procedure Laterality Date    AUGMENTATION MAMMAPLASTY Bilateral 2005    BREAST SURGERY Bilateral     RECONSTRUCION WITH IMPLANT PROTHESIS     COLON SURGERY      COLONOSCOPY  04/2021    ELBOW SURGERY Left 2015    Tennis elbow    KNEE SURGERY      MENISCECTOMY Left 06/2016       Current Outpatient Medications   Medication Sig Dispense Refill    buPROPion (WELLBUTRIN SR) 150 mg 12 hr tablet TAKE 1 TABLET BY MOUTH TWICE A  tablet 1    dicyclomine (BENTYL) 10 mg capsule Take 1 capsule (10 mg total) by mouth 3 (three) times a day as needed (diarrhea and abdominal pain) 90 capsule 0    rosuvastatin (CRESTOR) 10 MG tablet TAKE 1 TABLET BY MOUTH EVERY DAY 90 tablet 1     No current facility-administered medications for this visit  Allergies   Allergen Reactions    Iodinated Diagnostic Agents        Review of Systems    Video Exam    There were no vitals filed for this visit      Physical Exam pt denied any Si/HI/Ah/VH    I spent 45 minutes directly with the patient during this visit

## 2022-08-12 LAB
LAB AP GYN PRIMARY INTERPRETATION: NORMAL
Lab: NORMAL

## 2022-08-18 ENCOUNTER — TELEMEDICINE (OUTPATIENT)
Dept: BEHAVIORAL/MENTAL HEALTH CLINIC | Facility: CLINIC | Age: 52
End: 2022-08-18
Payer: COMMERCIAL

## 2022-08-18 DIAGNOSIS — F41.9 ANXIETY: Primary | ICD-10-CM

## 2022-08-18 PROCEDURE — 90834 PSYTX W PT 45 MINUTES: CPT | Performed by: SOCIAL WORKER

## 2022-08-19 NOTE — PSYCH
7: 00am-7:45am    Virtual Regular Visit  Therapist met w/pt for individual session  Pt identified a strength based quote she has been utilizing this week as well as therapist   Discussion was held around one positive thing that has happened this week for pt and how she continues to challenge her anxious/negative thoughts  Pt stated that she is working on focusing on what she has control over and trying to make the most out of her time outside of work to feel more balanced overall  Pt stated she has still had crying spells with being overwhelmed but is trying to cope better with it  Verification of patient location:    Patient is located in the following state in which I hold an active license [place]      Assessment/Plan: f/u in one week    Problem List Items Addressed This Visit        Other    Anxiety - Primary                 Reason for visit is No chief complaint on file  Encounter provider Constantino Churchill    Provider located at [facility] [address]  [address]  [facility] [address] [place]      Recent Visits  Date Type Provider Dept   08/18/22 Telemedicine [address] recent visits within past 7 days and meeting all other requirements  Future Appointments  No visits were found meeting these conditions  Showing future appointments within next 150 days and meeting all other requirements       The patient was identified by name and date of birth  Loredojacques Uriarte was informed that this is a telemedicine visit and that the [facility] and patient was informed that this is a secure, HIPAA-compliant platform  She agrees to proceed     My office door was closed  No one else was in the room  She acknowledged consent and understanding of privacy and security of the video platform   The patient has agreed to participate and understands they can discontinue the visit at any time  Patient is aware this is a billable service  Subjective  Dahlia Mayorga is a 54 y o  female    HPI 45 minutes    Past Medical History:   Diagnosis Date    Cancer [facility])     Colon cancer ([address] ) 2014    History of chemotherapy 2014    colon ca    History of radiation therapy 2014    colon ca    History of rectal cancer 2014    Locally advanced     History of tear of meniscus of knee joint     Hyperlipidemia     Tennis elbow        Past Surgical History:   Procedure Laterality Date    AUGMENTATION MAMMAPLASTY Bilateral 2005    BREAST SURGERY Bilateral     RECONSTRUCION WITH IMPLANT PROTHESIS     COLON SURGERY      COLONOSCOPY  04/2021    ELBOW SURGERY Left 2015    Tennis elbow    KNEE SURGERY      MENISCECTOMY Left 06/2016       Current Outpatient Medications   Medication Sig Dispense Refill    buPROPion (WELLBUTRIN SR) 150 mg 12 hr tablet TAKE 1 TABLET BY MOUTH TWICE A  tablet 1    dicyclomine (BENTYL) 10 mg capsule Take 1 capsule (10 mg total) by mouth 3 (three) times a day as needed (diarrhea and abdominal pain) 90 capsule 0    rosuvastatin (CRESTOR) 10 MG tablet TAKE 1 TABLET BY MOUTH EVERY DAY 90 tablet 1     No current facility-administered medications for this visit  Allergies   Allergen Reactions    Iodinated Diagnostic Agents        Review of Systems    Video Exam    There were no vitals filed for this visit      Physical Exam Pt denied any SI/HI/AH/VH    I spent 45 minutes directly with the patient during this visit

## 2022-08-25 ENCOUNTER — TELEMEDICINE (OUTPATIENT)
Dept: BEHAVIORAL/MENTAL HEALTH CLINIC | Facility: CLINIC | Age: 52
End: 2022-08-25
Payer: COMMERCIAL

## 2022-08-25 DIAGNOSIS — F41.8 DEPRESSION WITH ANXIETY: Primary | ICD-10-CM

## 2022-08-25 PROCEDURE — 90834 PSYTX W PT 45 MINUTES: CPT | Performed by: SOCIAL WORKER

## 2022-08-25 NOTE — PSYCH
7: 00am-7:45am    Virtual Regular Visit  Therapist met w/pt for individual session  Pt stated that she continues to work on managing her triggers  She identified a few situation this week that triggered her and discussion was held around ways she coped with it  Pt shared that working from home helps her a lot due to feeling safer and calmer  Therapist continued to work on challenging pt's anxious thoughts and implementing different grounding skills  Verification of patient location:    Patient is located in the following state in which I hold an active license [place]      Assessment/Plan: f/u in one week    Problem List Items Addressed This Visit        Other    Depression with anxiety - Primary                 Reason for visit is No chief complaint on file  Encounter provider Mary Gracia    Provider located at [facility] [address]  [address]  [facility] [address] [place]      Recent Visits  Date Type Provider Dept   08/18/22 Telemedicine [address] recent visits within past 7 days and meeting all other requirements  Future Appointments  No visits were found meeting these conditions  Showing future appointments within next 150 days and meeting all other requirements       The patient was identified by name and date of birth  Magdalene Fregoso was informed that this is a telemedicine visit and that the visit is being conducted through[employer] and patient was informed that this is a secure, HIPAA-compliant platform  She agrees to proceed     My office door was closed  No one else was in the room  She acknowledged consent and understanding of privacy and security of the video platform  The patient has agreed to participate and understands they can discontinue the visit at any time  Patient is aware this is a billable service       Subjective  Anamika Cross Lala Torres is a 54 y o  female    HPI 45 minutes    Past Medical History:   Diagnosis Date    Cancer [facility])     Colon cancer ([address] ) 2014    History of chemotherapy 2014    colon ca    History of radiation therapy 2014    colon ca    History of rectal cancer 2014    Locally advanced     History of tear of meniscus of knee joint     Hyperlipidemia     Tennis elbow        Past Surgical History:   Procedure Laterality Date    AUGMENTATION MAMMAPLASTY Bilateral 2005    BREAST SURGERY Bilateral     RECONSTRUCION WITH IMPLANT PROTHESIS     COLON SURGERY      COLONOSCOPY  04/2021    ELBOW SURGERY Left 2015    Tennis elbow    KNEE SURGERY      MENISCECTOMY Left 06/2016       Current Outpatient Medications   Medication Sig Dispense Refill    buPROPion (WELLBUTRIN SR) 150 mg 12 hr tablet TAKE 1 TABLET BY MOUTH TWICE A  tablet 1    dicyclomine (BENTYL) 10 mg capsule Take 1 capsule (10 mg total) by mouth 3 (three) times a day as needed (diarrhea and abdominal pain) 90 capsule 0    rosuvastatin (CRESTOR) 10 MG tablet TAKE 1 TABLET BY MOUTH EVERY DAY 90 tablet 1     No current facility-administered medications for this visit  Allergies   Allergen Reactions    Iodinated Diagnostic Agents        Review of Systems    Video Exam    There were no vitals filed for this visit      Physical Exam Pt denied any Si/HI/Ah/Vh    I spent 45 minutes directly with the patient during this visit

## 2022-09-01 ENCOUNTER — TELEMEDICINE (OUTPATIENT)
Dept: BEHAVIORAL/MENTAL HEALTH CLINIC | Facility: CLINIC | Age: 52
End: 2022-09-01
Payer: COMMERCIAL

## 2022-09-01 DIAGNOSIS — F41.8 DEPRESSION WITH ANXIETY: Primary | ICD-10-CM

## 2022-09-01 PROCEDURE — 90834 PSYTX W PT 45 MINUTES: CPT | Performed by: SOCIAL WORKER

## 2022-09-01 NOTE — PSYCH
9: 00am-9:45am    Virtual Regular Visit  Therapist met w/pt for individual session  Pt shared that she continues to have racing thoughts and stomach issues but feels as if it is better managed  Therapist and pt discussed how she is working on having a better overall work/life balance  Pt shared that she took tomorrow off and has one day off next week  She stated she is going to [facility] next week due to still having stomach issues and wanting to ensure everything is okay w/her medically  Therapist and pt discussed importance of continuing to maintain healthy boundaries and utilizing grounding tools  Verification of patient location:    Patient is located in the following state in which I hold an active license [place]      Assessment/Plan: f/u in one week    Problem List Items Addressed This Visit        Other    Depression with anxiety - Primary                 Reason for visit is No chief complaint on file  Encounter provider Jael Valverde    Provider located at [facility] [address]  [address]  [facility] [address] [place]      Recent Visits  Date Type Provider Dept   08/25/22 Telemedicine [address] recent visits within past 7 days and meeting all other requirements  Future Appointments  No visits were found meeting these conditions  Showing future appointments within next 150 days and meeting all other requirements       The patient was identified by name and date of birth  Marsha Wiggins was informed that this is a telemedicine visit and that the visit is being conducted through[facility] and patient was informed that this is a secure, HIPAA-compliant platform  She agrees to proceed     My office door was closed  No one else was in the room  She acknowledged consent and understanding of privacy and security of the video platform   The patient has agreed to participate and understands they can discontinue the visit at any time  Patient is aware this is a billable service  Raul Burrell is a 54 y o  female    HPI 45 minutes    Past Medical History:   Diagnosis Date    Cancer [facility])     Colon cancer ([address] ) 2014    History of chemotherapy 2014    colon ca    History of radiation therapy 2014    colon ca    History of rectal cancer 2014    Locally advanced     History of tear of meniscus of knee joint     Hyperlipidemia     Tennis elbow        Past Surgical History:   Procedure Laterality Date    AUGMENTATION MAMMAPLASTY Bilateral 2005    BREAST SURGERY Bilateral     RECONSTRUCION WITH IMPLANT PROTHESIS     COLON SURGERY      COLONOSCOPY  04/2021    ELBOW SURGERY Left 2015    Tennis elbow    KNEE SURGERY      MENISCECTOMY Left 06/2016       Current Outpatient Medications   Medication Sig Dispense Refill    buPROPion (WELLBUTRIN SR) 150 mg 12 hr tablet TAKE 1 TABLET BY MOUTH TWICE A  tablet 1    dicyclomine (BENTYL) 10 mg capsule Take 1 capsule (10 mg total) by mouth 3 (three) times a day as needed (diarrhea and abdominal pain) 90 capsule 0    rosuvastatin (CRESTOR) 10 MG tablet TAKE 1 TABLET BY MOUTH EVERY DAY 90 tablet 1     No current facility-administered medications for this visit  Allergies   Allergen Reactions    Iodinated Diagnostic Agents        Review of Systems    Video Exam    There were no vitals filed for this visit      Physical Exam  Pt denied any SI/HI/AH/VH    I spent 45 minutes directly with the patient during this visit

## 2022-09-08 ENCOUNTER — TELEMEDICINE (OUTPATIENT)
Dept: BEHAVIORAL/MENTAL HEALTH CLINIC | Facility: CLINIC | Age: 52
End: 2022-09-08
Payer: COMMERCIAL

## 2022-09-08 DIAGNOSIS — F41.9 ANXIETY: Primary | ICD-10-CM

## 2022-09-08 PROCEDURE — 90832 PSYTX W PT 30 MINUTES: CPT | Performed by: SOCIAL WORKER

## 2022-09-08 NOTE — PSYCH
8: 45am-9:15am    Assessment/Plan: f/u in one week     There are no diagnoses linked to this encounter  Subjective: Therapist met w/pt for individual session  Pt stated that going to the hospital to get her cancer remission checked on was very emotional   Therapist and pt processed the different emotions that came up for pt and then how work continued to email her which added additional stress for her  Therapist worked on helping pt ground herself and help challenge the negative/anxious thoughts  Patient ID: Jose Casiano is a 54 y o  female  HPI 30 mins    Review of Systems      Objective: Pt appeared to be anxious and overwhelmed         Physical Exam  pt denied any SI/HI/Ah/Vh

## 2022-09-15 ENCOUNTER — TELEMEDICINE (OUTPATIENT)
Dept: BEHAVIORAL/MENTAL HEALTH CLINIC | Facility: CLINIC | Age: 52
End: 2022-09-15
Payer: COMMERCIAL

## 2022-09-15 DIAGNOSIS — F41.9 ANXIETY: Primary | ICD-10-CM

## 2022-09-15 PROCEDURE — 90834 PSYTX W PT 45 MINUTES: CPT | Performed by: SOCIAL WORKER

## 2022-09-16 NOTE — PSYCH
8: 45am-9:30am    Virtual Regular   Therapist met w/pt for individual session  Symptoms include: irritability, racing thoughts, mood shifts  Pt stated that she received an email about having a meeting to discuss status of work from home  Pt stated that she just got a referral for PT that may help w/her GI issues  However, therapist and pt discussed how pt continues to get triggered and anxiety heightens very quickly  Discussion was held around pt having weekly sessions have helped keep her a little more stable emotionally  Therapist and pt discussed other things pt is trying to do to help stabilize her mood  Verification of patient location:    Patient is located in the following state in which I hold an active license [place]      Assessment/Plan: f/u in one week    Problem List Items Addressed This Visit        Other    Anxiety - Primary                 Reason for visit is No chief complaint on file  Encounter provider Rosa Blanco    Provider located at [facility] [address]  [address]  [facility] [address] [place]      Recent Visits  No visits were found meeting these conditions  Showing recent visits within past 7 days and meeting all other requirements  Future Appointments  No visits were found meeting these conditions  Showing future appointments within next 150 days and meeting all other requirements       The patient was identified by name and date of birth  Dahlia Mayorga was informed that this is a telemedicine visit and that the visit [facility] being [facility] and patient was informed this is a secure, HIPAA-complaint platform  She agrees to proceed     My office door was closed  No one else was in the room  She acknowledged consent and understanding of privacy and security of the video platform   The patient has agreed to participate and understands they can discontinue the visit at any time  Patient is aware this is a billable service  Subjective  Brenda Brady is a 54 y o  female    HPI 45 minutes    Past Medical History:   Diagnosis Date    Cancer [facility])     [address] [facility] [address] [address] 2014    History of chemotherapy 2014    colon ca    History of radiation therapy 2014    colon ca    History of rectal cancer 2014    Locally advanced     History of tear of meniscus of knee joint     Hyperlipidemia     Tennis elbow        Past Surgical History:   Procedure Laterality Date    AUGMENTATION MAMMAPLASTY Bilateral 2005    BREAST SURGERY Bilateral     RECONSTRUCION WITH IMPLANT PROTHESIS     COLON SURGERY      COLONOSCOPY  04/2021    ELBOW SURGERY Left 2015    Tennis elbow    KNEE SURGERY      MENISCECTOMY Left 06/2016       Current Outpatient Medications   Medication Sig Dispense Refill    buPROPion (WELLBUTRIN SR) 150 mg 12 hr tablet TAKE 1 TABLET BY MOUTH TWICE A  tablet 1    dicyclomine (BENTYL) 10 mg capsule Take 1 capsule (10 mg total) by mouth 3 (three) times a day as needed (diarrhea and abdominal pain) 90 capsule 0    rosuvastatin (CRESTOR) 10 MG tablet TAKE 1 TABLET BY MOUTH EVERY DAY 90 tablet 1     No current facility-administered medications for this visit  Allergies   Allergen Reactions    Iodinated Diagnostic Agents        Review of Systems    Video Exam    There were no vitals filed for this visit      Physical Exam Pt denied any SI/HI/Ah/VH    I spent 45 minutes directly with the patient during this visit

## 2022-09-23 ENCOUNTER — TELEMEDICINE (OUTPATIENT)
Dept: BEHAVIORAL/MENTAL HEALTH CLINIC | Facility: CLINIC | Age: 52
End: 2022-09-23
Payer: COMMERCIAL

## 2022-09-23 DIAGNOSIS — F41.8 DEPRESSION WITH ANXIETY: Primary | ICD-10-CM

## 2022-09-23 PROCEDURE — 90834 PSYTX W PT 45 MINUTES: CPT | Performed by: SOCIAL WORKER

## 2022-09-23 NOTE — PSYCH
7: 15am-8:00am    Virtual Regular Visit  Therapist met w/pt for individual session  Pt shared that she had a few meetings w/different people at her job and feels more heard  Pt stated that she feels her feelings have been validated and in some ways she is feeling more hopeful  Therapist and pt discussed situations that triggered her this week and ongoing ways to help her manage her anxiety/stress  Verification of patient location:    Patient is located in the following state in which I hold an [place] license [place]      Assessment/Plan: f/u in one week    Problem List Items Addressed This Visit        Other    Depression with anxiety - Primary                 Reason for visit is No chief complaint on file  Encounter provider David Reardon    Provider located at [facility] [address]  [address]  [facility] [address] [place]      Recent Visits  No visits were found meeting these conditions  Showing recent visits within past 7 days and meeting all other requirements  Today's Visits  Date Type Provider Dept   09/23/22 Telemedicine [address] today's visits and meeting all other requirements  Future Appointments  No visits were found meeting these conditions  Showing future appointments within next 150 days and meeting all other requirements       The patient was identified by name and date of birth  Félix Pressley was informed that this is a telemedicine visit and that the visit is being conducted through[employer] and patient was informed that this is a secure, HIPAA-compliant platform  She agrees to proceed     My office door was closed  No one else was in the room  She acknowledged consent and understanding of privacy and security of the video platform   The patient has agreed to participate and understands they can discontinue the visit at any time     Patient is aware this is a billable service  Subjective  Ivet Baez is a 54 y o  female    HPI 45 minutes    Past Medical History:   Diagnosis Date    Cancer [facility])     Colon cancer ([address] ) 2014    History of chemotherapy 2014    colon ca    History of radiation therapy 2014    colon ca    History of rectal cancer 2014    Locally advanced     History of tear of meniscus of knee joint     Hyperlipidemia     Tennis elbow        Past Surgical History:   Procedure Laterality Date    AUGMENTATION MAMMAPLASTY Bilateral 2005    BREAST SURGERY Bilateral     RECONSTRUCION WITH IMPLANT PROTHESIS     COLON SURGERY      COLONOSCOPY  04/2021    ELBOW SURGERY Left 2015    Tennis elbow    KNEE SURGERY      MENISCECTOMY Left 06/2016       Current Outpatient Medications   Medication Sig Dispense Refill    buPROPion (WELLBUTRIN SR) 150 mg 12 hr tablet TAKE 1 TABLET BY MOUTH TWICE A  tablet 1    dicyclomine (BENTYL) 10 mg capsule Take 1 capsule (10 mg total) by mouth 3 (three) times a day as needed (diarrhea and abdominal pain) 90 capsule 0    rosuvastatin (CRESTOR) 10 MG tablet TAKE 1 TABLET BY MOUTH EVERY DAY 90 tablet 1     No current facility-administered medications for this visit  Allergies   Allergen Reactions    Iodinated Diagnostic Agents        Review of Systems    Video Exam    There were no vitals filed for this visit      Physical Exam Pt denied any SI/HI/Ah/Vh    I spent 45 minutes directly with the patient during this visit

## 2022-09-29 ENCOUNTER — TELEMEDICINE (OUTPATIENT)
Dept: BEHAVIORAL/MENTAL HEALTH CLINIC | Facility: CLINIC | Age: 52
End: 2022-09-29
Payer: COMMERCIAL

## 2022-09-29 DIAGNOSIS — F41.8 DEPRESSION WITH ANXIETY: Primary | ICD-10-CM

## 2022-09-29 PROCEDURE — 90834 PSYTX W PT 45 MINUTES: CPT | Performed by: SOCIAL WORKER

## 2022-09-29 NOTE — PSYCH
8: 45am-9:30am    Virtual Regular Visit  Therapist met w/pt for individual session  Pt stated that she is struggling emotionally this week  Pt stated that she is feeling more irritable, on edge, racing thoughts, depressed, stressed  Therapist and pt discussed current stressors and how pt continues to feel heightened symptoms  Therapist utilized several different techniques including MI and CBT to help w/pts heightened symptoms  Pt seemed to respond well to therapist's interventions  Verification of patient location:    Patient is located in the following state in which I hold an [place] license [place]      Assessment/Plan:    Problem List Items Addressed This Visit        Other    Depression with anxiety - Primary                   Reason for visit is No chief complaint on file  Encounter provider Felix Winslow    Provider located at [facility] [address]  [address]  [facility] [address] [place]      Recent Visits  Date Type Provider Dept   09/23/22 Telemedicine [address] recent visits within past 7 days and meeting all other requirements  Future Appointments  No visits were found meeting these conditions  Showing future appointments within next 150 days and meeting all other requirements       The patient was identified by name and date of birth  Naveen Cost was informed that this is a telemedicine visit and that the visit is being conducted through[employer] and patient was informed that this is a secure, HIPAA-compliant platform  She agrees to proceed     My office door was closed  No one else was in the room  She acknowledged consent and understanding of privacy and security of the video platform  The patient has agreed to participate and understands they can discontinue the visit at any time  Patient is aware this is a billable service  Raul Burgos is a 54 y o  female    HPI 45 minutes    Past Medical History:   Diagnosis Date    Cancer [facility])     Colon cancer ([address] ) 2014    History of chemotherapy 2014    colon ca    History of radiation therapy 2014    colon ca    History of rectal cancer 2014    Locally advanced     History of tear of meniscus of knee joint     Hyperlipidemia     Tennis elbow        Past Surgical History:   Procedure Laterality Date    AUGMENTATION MAMMAPLASTY Bilateral 2005    BREAST SURGERY Bilateral     RECONSTRUCION WITH IMPLANT PROTHESIS     COLON SURGERY      COLONOSCOPY  04/2021    ELBOW SURGERY Left 2015    Tennis elbow    KNEE SURGERY      MENISCECTOMY Left 06/2016       Current Outpatient Medications   Medication Sig Dispense Refill    buPROPion (WELLBUTRIN SR) 150 mg 12 hr tablet TAKE 1 TABLET BY MOUTH TWICE A  tablet 1    dicyclomine (BENTYL) 10 mg capsule Take 1 capsule (10 mg total) by mouth 3 (three) times a day as needed (diarrhea and abdominal pain) 90 capsule 0    rosuvastatin (CRESTOR) 10 MG tablet TAKE 1 TABLET BY MOUTH EVERY DAY 90 tablet 1     No current facility-administered medications for this visit  Allergies   Allergen Reactions    Iodinated Diagnostic Agents        Review of Systems    Video Exam    There were no vitals filed for this visit      Physical Exam pt denied any Si/Hi/Ah/Vh    I spent 45 minutes directly with the patient during this visit

## 2022-10-06 ENCOUNTER — TELEMEDICINE (OUTPATIENT)
Dept: BEHAVIORAL/MENTAL HEALTH CLINIC | Facility: CLINIC | Age: 52
End: 2022-10-06
Payer: COMMERCIAL

## 2022-10-06 DIAGNOSIS — F41.8 DEPRESSION WITH ANXIETY: Primary | ICD-10-CM

## 2022-10-06 PROCEDURE — 90834 PSYTX W PT 45 MINUTES: CPT | Performed by: SOCIAL WORKER

## 2022-10-06 NOTE — PSYCH
7: 00am-7:45am    Virtual Regular Visit  Therapist met w/pt for individual session  Pt shared that she continues to struggle w/heightened symptoms: irritability, mood swings, heart palpitations, nausea  Therapist and pt discussed a big trigger she encountered this week and how she ruminated on it for several days  Therapist and pt discussed coping skills to try to implement to ruminate less  Therapist and pt continued to talk about expectations and how to work on finding a gray area  Verification of patient location:    Patient is located in the following state in which [place] an [place]      Assessment/Plan: f/u in one week    Problem List Items Addressed This Visit        Other    Depression with anxiety - Primary                 Reason for visit is No chief complaint on file  Encounter [facility]    Provider located at [facility] [address]  [address]  [facility] [address] [place]      Recent Visits  Date Type Provider Dept   09/29/22 Telemedicine [address] recent visits within past 7 days and meeting all other requirements  Future Appointments  No visits were found meeting these conditions  Showing future appointments within next 150 days and meeting all other requirements       The patient was identified by name and date of birth  Jesse Keep was informed that this is a telemedicine visit and that the [facility] and patient was informed that this is a secure, HIPAA-compliant platform  She agrees to proceed     My office door was closed  No one else was in the room  She acknowledged consent and understanding of privacy and security of the video platform  The patient has agreed to participate and understands they can discontinue the visit at any time      Patient is aware this is a billable service  Subjective  Jaylen Tejeda is a 54 y o  female    HPI 45 minutes    Past Medical History:   Diagnosis Date    Cancer [facility])     Colon cancer ([address] ) 2014    History of chemotherapy 2014    colon ca    History of radiation therapy 2014    colon ca    History of rectal cancer 2014    Locally advanced     History of tear of meniscus of knee joint     Hyperlipidemia     Tennis elbow        Past Surgical History:   Procedure Laterality Date    AUGMENTATION MAMMAPLASTY Bilateral 2005    BREAST SURGERY Bilateral     RECONSTRUCION WITH IMPLANT PROTHESIS     COLON SURGERY      COLONOSCOPY  04/2021    ELBOW SURGERY Left 2015    Tennis elbow    KNEE SURGERY      MENISCECTOMY Left 06/2016       Current Outpatient Medications   Medication Sig Dispense Refill    buPROPion (WELLBUTRIN SR) 150 mg 12 hr tablet TAKE 1 TABLET BY MOUTH TWICE A  tablet 1    dicyclomine (BENTYL) 10 mg capsule Take 1 capsule (10 mg total) by mouth 3 (three) times a day as needed (diarrhea and abdominal pain) 90 capsule 0    rosuvastatin (CRESTOR) 10 MG tablet TAKE 1 TABLET BY MOUTH EVERY DAY 90 tablet 1     No current facility-administered medications for this visit  Allergies   Allergen Reactions    Iodinated Diagnostic Agents        Review of Systems    Video Exam    There were no vitals filed for this visit      Physical Exam Pt denied any SI/HI/AH/VH    I spent 45 minutes directly with the patient during this visit

## 2022-10-13 ENCOUNTER — TELEMEDICINE (OUTPATIENT)
Dept: BEHAVIORAL/MENTAL HEALTH CLINIC | Facility: CLINIC | Age: 52
End: 2022-10-13
Payer: COMMERCIAL

## 2022-10-13 DIAGNOSIS — F41.8 DEPRESSION WITH ANXIETY: Primary | ICD-10-CM

## 2022-10-13 PROCEDURE — 90834 PSYTX W PT 45 MINUTES: CPT | Performed by: SOCIAL WORKER

## 2022-10-13 NOTE — PSYCH
Visit Time    Visit Start Time: 07:00 AM  Visit Stop Time: 7:42 AM  Total Visit Duration: 42 minutes     Virtual Regular Visit  Therapist met w/pt for individual session  Pt stated that this week has been more manageable  Therapist and pt discussed that she has been triggered this week but overall has been able to manage her anxiety(racing thoughts, irritability, GI issues)  Discussion was held around having time w/family and how that continues to ground her  Pt stated that taking a step back and not reacting right away helps  She stated that she knows she needs to continue to make sure she takes care of herself and pays attention to what she needs  Verification of patient location:    Patient is located in the following state in which [place] an [place] license [place]      Assessment/Plan: f/u in one week    Problem List Items Addressed This Visit        Other    Depression with anxiety - Primary                 Reason for visit is No chief complaint on file  Encounter provider Maynor Espino    Provider located at [facility] [address]  [address]  [facility] [address] [place]      Recent Visits  Date Type Provider Dept   10/06/22 Telemedicine [address] recent visits within past 7 days and meeting all other requirements  Future Appointments  No visits were found meeting these conditions  Showing future appointments within next 150 days and meeting all other requirements       The patient was identified by name and date of birth  Nora Jump was informed that this is a telemedicine visit and that the [facility] and patient was informed that this is a secure, HIPAA-compliant platform  She agrees to proceed     My office door was closed  No one else was in the room    She acknowledged consent and understanding of privacy and security of the video platform  The patient has agreed to participate and understands they can discontinue the visit at any time  Patient is aware this is a billable service  Subjective  Bernie Amanda is a 54 y o  female    HPI     Past Medical History:   Diagnosis Date   • Cancer [facility])    • Colon cancer ([address] ) 2014   • History of chemotherapy 2014    colon ca   • History of radiation therapy 2014    colon ca   • History of rectal cancer 2014    Locally advanced    • History of tear of meniscus of knee joint    • Hyperlipidemia    • Tennis elbow        Past Surgical History:   Procedure Laterality Date   • AUGMENTATION MAMMAPLASTY Bilateral 2005   • BREAST SURGERY Bilateral     RECONSTRUCION WITH IMPLANT PROTHESIS    • COLON SURGERY     • COLONOSCOPY  04/2021   • ELBOW SURGERY Left 2015    Tennis elbow   • KNEE SURGERY     • MENISCECTOMY Left 06/2016       Current Outpatient Medications   Medication Sig Dispense Refill   • buPROPion (WELLBUTRIN SR) 150 mg 12 hr tablet TAKE 1 TABLET BY MOUTH TWICE A  tablet 1   • dicyclomine (BENTYL) 10 mg capsule Take 1 capsule (10 mg total) by mouth 3 (three) times a day as needed (diarrhea and abdominal pain) 90 capsule 0   • rosuvastatin (CRESTOR) 10 MG tablet TAKE 1 TABLET BY MOUTH EVERY DAY 90 tablet 1     No current facility-administered medications for this visit  Allergies   Allergen Reactions   • Iodinated Diagnostic Agents        Review of Systems    Video Exam    There were no vitals filed for this visit      Physical Exam  Pt denied any SI/HI/AH/Vh    I spent 42 minutes directly with the patient during this visit

## 2022-10-20 ENCOUNTER — TELEMEDICINE (OUTPATIENT)
Dept: BEHAVIORAL/MENTAL HEALTH CLINIC | Facility: CLINIC | Age: 52
End: 2022-10-20
Payer: COMMERCIAL

## 2022-10-20 DIAGNOSIS — F41.8 DEPRESSION WITH ANXIETY: Primary | ICD-10-CM

## 2022-10-20 DIAGNOSIS — F41.9 ANXIETY: ICD-10-CM

## 2022-10-20 PROCEDURE — 90834 PSYTX W PT 45 MINUTES: CPT | Performed by: SOCIAL WORKER

## 2022-10-20 NOTE — PSYCH
Visit Time    Visit Start Time: 8:45 AM  Visit Stop Time: 9:30 AM  Total Visit Duration: 45 minutes     Virtual Regular Visit  Therapist met w/pt for individual session  Pt shared that she is feeling anxious and on edge due to getting a meeting request yesterday for today with HR  Pt expressed fear that they will want her back in the office and how she isn't ready  Therapist and pt discussed how she continues to have GI issues and continues to have racing thoughts, heart palpitations, increase in sweating that she needs to continue to work on managing  Pts mood shifted a lot throughout session but pt responded well to grounding techniques  Verification of patient location:    Patient is located in the following state in which I hold an active license [place]      Assessment/Plan: f/u in one week    Problem List Items Addressed This Visit        Other    Anxiety    Depression with anxiety - Primary                   Reason for visit is No chief complaint on file  Encounter provider Rosa Blanco    Provider located at [facility] [address]  [address]  [facility] [address] [place]      Recent Visits  Date Type Provider Dept   10/13/22 Telemedicine [address] recent visits within past 7 days and meeting all other requirements  Future Appointments  No visits were found meeting these conditions  Showing future appointments within next 150 days and meeting all other requirements       The patient was identified by name and date of birth  Dahlia Mayorga was informed that this is a telemedicine visit and that the visit is being conducted throughthe [employer]  She agrees to proceed     My office door was closed  No one else was in the room  She acknowledged consent and understanding of privacy and security of the video platform   The patient has agreed to participate and understands they can discontinue the visit at any time  Patient is aware this is a billable service  Subjective  Mary Mulligan is a 54 y o  female    HPI 45 minutes    Past Medical History:   Diagnosis Date   • Cancer [facility])    • Colon cancer ([address] ) 2014   • History of chemotherapy 2014    colon ca   • History of radiation therapy 2014    colon ca   • History of rectal cancer 2014    Locally advanced    • History of tear of meniscus of knee joint    • Hyperlipidemia    • Tennis elbow        Past Surgical History:   Procedure Laterality Date   • AUGMENTATION MAMMAPLASTY Bilateral 2005   • BREAST SURGERY Bilateral     RECONSTRUCION WITH IMPLANT PROTHESIS    • COLON SURGERY     • COLONOSCOPY  04/2021   • ELBOW SURGERY Left 2015    Tennis elbow   • KNEE SURGERY     • MENISCECTOMY Left 06/2016       Current Outpatient Medications   Medication Sig Dispense Refill   • buPROPion (WELLBUTRIN SR) 150 mg 12 hr tablet TAKE 1 TABLET BY MOUTH TWICE A  tablet 1   • dicyclomine (BENTYL) 10 mg capsule Take 1 capsule (10 mg total) by mouth 3 (three) times a day as needed (diarrhea and abdominal pain) 90 capsule 0   • rosuvastatin (CRESTOR) 10 MG tablet TAKE 1 TABLET BY MOUTH EVERY DAY 90 tablet 1     No current facility-administered medications for this visit  Allergies   Allergen Reactions   • Iodinated Diagnostic Agents        Review of Systems    Video Exam    There were no vitals filed for this visit      Physical Exam Pt denied any SI/HI/AH/VH    I spent 45 minutes directly with the patient during this visit

## 2022-10-27 ENCOUNTER — TELEMEDICINE (OUTPATIENT)
Dept: BEHAVIORAL/MENTAL HEALTH CLINIC | Facility: CLINIC | Age: 52
End: 2022-10-27
Payer: COMMERCIAL

## 2022-10-27 DIAGNOSIS — F41.9 ANXIETY: Primary | ICD-10-CM

## 2022-10-27 PROCEDURE — 90834 PSYTX W PT 45 MINUTES: CPT | Performed by: SOCIAL WORKER

## 2022-10-27 NOTE — PSYCH
8: 45am-9:30am    Virtual Regular Visit  Therapist met w/pt for individual session  Pt shared that she had the meeting and is approved to work from home until March 2023  She stated she would try to go into work 1x/month but discussion was held around the anxiety she has over doing this  She shared several anxiety triggers this week that caused her to get emotional   She continues to struggle w/mood instability, nausea, and difficulty with keeping food down  Therapist and pt discussed ongoing strategies to help her manage her symptoms  Verification of patient location:    Patient is located in the following state in which I hold an active license [place]      Assessment/Plan:  F/u in a week    Problem List Items Addressed This Visit        Other    Anxiety - Primary                 Reason for visit is No chief complaint on file  Encounter provider Marie Damon    Provider located at [facility] [address]  [address]  [place] [address] [place] [place]      Recent Visits  Date Type Provider Dept   10/20/22 Telemedicine [address] recent visits within past 7 days and meeting all other requirements  Future Appointments  No visits were found meeting these conditions  Showing future appointments within next 150 days and meeting all other requirements       The patient was identified by name and date of birth  Nellie Corado was informed that this is a telemedicine visit and that the visit is being conducted throughthe [employer] platform  She agrees to proceed     My office door was closed  No one else was in the room  She acknowledged consent and understanding of privacy and security of the video platform  The patient has agreed to participate and understands they can discontinue the visit at any time  Patient is aware this is a billable service  Raul Patel is a 54 y o  female    HPI 45 minutes    Past Medical History:   Diagnosis Date   • Cancer [facility])    • Colon cancer ([address] ) 2014   • History of chemotherapy 2014    colon ca   • History of radiation therapy 2014    colon ca   • History of rectal cancer 2014    Locally advanced    • History of tear of meniscus of knee joint    • Hyperlipidemia    • Tennis elbow        Past Surgical History:   Procedure Laterality Date   • AUGMENTATION MAMMAPLASTY Bilateral 2005   • BREAST SURGERY Bilateral     RECONSTRUCION WITH IMPLANT PROTHESIS    • COLON SURGERY     • COLONOSCOPY  04/2021   • ELBOW SURGERY Left 2015    Tennis elbow   • KNEE SURGERY     • MENISCECTOMY Left 06/2016       Current Outpatient Medications   Medication Sig Dispense Refill   • buPROPion (WELLBUTRIN SR) 150 mg 12 hr tablet TAKE 1 TABLET BY MOUTH TWICE A  tablet 1   • dicyclomine (BENTYL) 10 mg capsule Take 1 capsule (10 mg total) by mouth 3 (three) times a day as needed (diarrhea and abdominal pain) 90 capsule 0   • rosuvastatin (CRESTOR) 10 MG tablet TAKE 1 TABLET BY MOUTH EVERY DAY 90 tablet 1     No current facility-administered medications for this visit  Allergies   Allergen Reactions   • Iodinated Diagnostic Agents        Review of Systems    Video Exam    There were no vitals filed for this visit      Physical Exam Pt denied any SI/HI/AH/VH    Visit Time    Visit Start Time: 8:45  Visit Stop Time: 9:30  Total Visit Duration: 45 minutes

## 2022-11-03 ENCOUNTER — TELEMEDICINE (OUTPATIENT)
Dept: BEHAVIORAL/MENTAL HEALTH CLINIC | Facility: CLINIC | Age: 52
End: 2022-11-03

## 2022-11-03 DIAGNOSIS — F41.1 GENERALIZED ANXIETY DISORDER: Primary | ICD-10-CM

## 2022-11-07 NOTE — PSYCH
Virtual Regular Visit  Therapist met w/pt for individual session  Pt stated that she has been irritable and on edge but feels as if it isn't affecting her daily life as much this week  Pt stated she is still having GI issues  Therapist and pt discussed different things that trigger her throughout the day and where they come from and how to continue to manage those triggers in an effective way  Verification of patient location:    Patient is located in the following state in which I hold an active license [place]      Assessment/Plan: f/u in one week    Problem List Items Addressed This Visit    None                Reason for visit is No chief complaint on file  Encounter provider Prabha Reyes    Provider located at [facility] [address]  [address]  [facility] [address] [place]      Recent Visits  Date Type Provider Dept   11/03/22 Telemedicine [address] recent visits within past 7 days and meeting all other requirements  Future Appointments  No visits were found meeting these conditions  Showing future appointments within next 150 days and meeting all other requirements       The patient was identified by name and date of birth  Rodrigue Mitchell was informed that this is a telemedicine visit and that the visit is being conducted throughthe [employer] platform  She agrees to proceed     My office door was closed  No one else was in the room  She acknowledged consent and understanding of privacy and security of the video platform  The patient has agreed to participate and understands they can discontinue the visit at any time  Patient is aware this is a billable service  Subjective  Rodrigue Mitchell is a 54 y o  female          HPI 45 minutes    Past Medical History:   Diagnosis Date   • Cancer [facility])    • Colon cancer ([facility] [address] ) 2014   • History of chemotherapy 2014    colon ca   • History of radiation therapy 2014    colon ca   • History of rectal cancer 2014    Locally advanced    • History of tear of meniscus of knee joint    • Hyperlipidemia    • Tennis elbow        Past Surgical History:   Procedure Laterality Date   • AUGMENTATION MAMMAPLASTY Bilateral 2005   • BREAST SURGERY Bilateral     RECONSTRUCION WITH IMPLANT PROTHESIS    • COLON SURGERY     • COLONOSCOPY  04/2021   • ELBOW SURGERY Left 2015    Tennis elbow   • KNEE SURGERY     • MENISCECTOMY Left 06/2016       Current Outpatient Medications   Medication Sig Dispense Refill   • buPROPion (WELLBUTRIN SR) 150 mg 12 hr tablet TAKE 1 TABLET BY MOUTH TWICE A  tablet 1   • dicyclomine (BENTYL) 10 mg capsule Take 1 capsule (10 mg total) by mouth 3 (three) times a day as needed (diarrhea and abdominal pain) 90 capsule 0   • rosuvastatin (CRESTOR) 10 MG tablet TAKE 1 TABLET BY MOUTH EVERY DAY 90 tablet 1     No current facility-administered medications for this visit  Allergies   Allergen Reactions   • Iodinated Diagnostic Agents        Review of Systems    Video Exam    There were no vitals filed for this visit      Physical Exam     Visit Time    Visit Start Time: 7:00am    Visit Stop Time: 7:50 AM  Total Visit Duration: 50 minutes

## 2022-11-10 ENCOUNTER — TELEMEDICINE (OUTPATIENT)
Dept: BEHAVIORAL/MENTAL HEALTH CLINIC | Facility: CLINIC | Age: 52
End: 2022-11-10

## 2022-11-10 DIAGNOSIS — F41.9 ANXIETY: Primary | ICD-10-CM

## 2022-11-10 NOTE — PSYCH
Assessment/Plan:  F/u in one week     There are no diagnoses linked to this encounter  Subjective: Therapist met w/pt for individual session  Pt stated that this week went pretty well  She identified one situation this week in which triggered her but she was able to ground herself and not react impulsively  Therapist and pt discussed how her significant other working has helped her feel less stuck/trapped  Pt identified her granddaughter will be coming over later tonight which is something else that she feels is helping her overall emotional state  Therapist and pt discussed pt's ongoing mood shifts throughout the day and how the coping skills have been more effective this week  Patient ID: Abram Ayoub is a 54 y o  female  HPI    Review of Systems      Objective: Pt appeared to be in a good mood        Physical Exam  Pt denied any SI/HI/Ah/Vh

## 2022-11-15 ENCOUNTER — OFFICE VISIT (OUTPATIENT)
Dept: FAMILY MEDICINE CLINIC | Facility: CLINIC | Age: 52
End: 2022-11-15

## 2022-11-15 ENCOUNTER — APPOINTMENT (OUTPATIENT)
Dept: LAB | Facility: HOSPITAL | Age: 52
End: 2022-11-15

## 2022-11-15 ENCOUNTER — TELEPHONE (OUTPATIENT)
Dept: FAMILY MEDICINE CLINIC | Facility: CLINIC | Age: 52
End: 2022-11-15

## 2022-11-15 VITALS
DIASTOLIC BLOOD PRESSURE: 80 MMHG | HEIGHT: 64 IN | BODY MASS INDEX: 25.37 KG/M2 | HEART RATE: 72 BPM | OXYGEN SATURATION: 98 % | SYSTOLIC BLOOD PRESSURE: 110 MMHG | TEMPERATURE: 96.8 F | RESPIRATION RATE: 14 BRPM | WEIGHT: 148.6 LBS

## 2022-11-15 DIAGNOSIS — Z00.00 HEALTHCARE MAINTENANCE: ICD-10-CM

## 2022-11-15 DIAGNOSIS — Z85.038 HISTORY OF COLON CANCER: Primary | ICD-10-CM

## 2022-11-15 DIAGNOSIS — N18.31 STAGE 3A CHRONIC KIDNEY DISEASE (HCC): ICD-10-CM

## 2022-11-15 DIAGNOSIS — L85.3 XEROSIS OF SKIN: ICD-10-CM

## 2022-11-15 DIAGNOSIS — F41.8 DEPRESSION WITH ANXIETY: ICD-10-CM

## 2022-11-15 DIAGNOSIS — E55.9 VITAMIN D DEFICIENCY: Primary | ICD-10-CM

## 2022-11-15 DIAGNOSIS — E78.5 HYPERLIPIDEMIA, UNSPECIFIED HYPERLIPIDEMIA TYPE: ICD-10-CM

## 2022-11-15 LAB
25(OH)D3 SERPL-MCNC: 14 NG/ML (ref 30–100)
ALBUMIN SERPL BCP-MCNC: 3.4 G/DL (ref 3.5–5)
ALP SERPL-CCNC: 87 U/L (ref 46–116)
ALT SERPL W P-5'-P-CCNC: 32 U/L (ref 12–78)
ANION GAP SERPL CALCULATED.3IONS-SCNC: 5 MMOL/L (ref 4–13)
AST SERPL W P-5'-P-CCNC: 29 U/L (ref 5–45)
BASOPHILS # BLD AUTO: 0.06 THOUSANDS/ÂΜL (ref 0–0.1)
BASOPHILS NFR BLD AUTO: 1 % (ref 0–1)
BILIRUB SERPL-MCNC: 0.6 MG/DL (ref 0.2–1)
BUN SERPL-MCNC: 12 MG/DL (ref 5–25)
CALCIUM ALBUM COR SERPL-MCNC: 9.8 MG/DL (ref 8.3–10.1)
CALCIUM SERPL-MCNC: 9.3 MG/DL (ref 8.3–10.1)
CHLORIDE SERPL-SCNC: 111 MMOL/L (ref 96–108)
CHOLEST SERPL-MCNC: 201 MG/DL
CO2 SERPL-SCNC: 25 MMOL/L (ref 21–32)
CREAT SERPL-MCNC: 0.92 MG/DL (ref 0.6–1.3)
EOSINOPHIL # BLD AUTO: 0.43 THOUSAND/ÂΜL (ref 0–0.61)
EOSINOPHIL NFR BLD AUTO: 5 % (ref 0–6)
ERYTHROCYTE [DISTWIDTH] IN BLOOD BY AUTOMATED COUNT: 13.2 % (ref 11.6–15.1)
GFR SERPL CREATININE-BSD FRML MDRD: 73 ML/MIN/1.73SQ M
GLUCOSE P FAST SERPL-MCNC: 95 MG/DL (ref 65–99)
HCT VFR BLD AUTO: 45.2 % (ref 34.8–46.1)
HDLC SERPL-MCNC: 76 MG/DL
HGB BLD-MCNC: 14.6 G/DL (ref 11.5–15.4)
IMM GRANULOCYTES # BLD AUTO: 0.02 THOUSAND/UL (ref 0–0.2)
IMM GRANULOCYTES NFR BLD AUTO: 0 % (ref 0–2)
LDLC SERPL CALC-MCNC: 104 MG/DL (ref 0–100)
LYMPHOCYTES # BLD AUTO: 2.62 THOUSANDS/ÂΜL (ref 0.6–4.47)
LYMPHOCYTES NFR BLD AUTO: 31 % (ref 14–44)
MCH RBC QN AUTO: 31.3 PG (ref 26.8–34.3)
MCHC RBC AUTO-ENTMCNC: 32.3 G/DL (ref 31.4–37.4)
MCV RBC AUTO: 97 FL (ref 82–98)
MONOCYTES # BLD AUTO: 0.61 THOUSAND/ÂΜL (ref 0.17–1.22)
MONOCYTES NFR BLD AUTO: 7 % (ref 4–12)
NEUTROPHILS # BLD AUTO: 4.78 THOUSANDS/ÂΜL (ref 1.85–7.62)
NEUTS SEG NFR BLD AUTO: 56 % (ref 43–75)
NONHDLC SERPL-MCNC: 125 MG/DL
NRBC BLD AUTO-RTO: 0 /100 WBCS
PLATELET # BLD AUTO: 366 THOUSANDS/UL (ref 149–390)
PMV BLD AUTO: 9.2 FL (ref 8.9–12.7)
POTASSIUM SERPL-SCNC: 4.2 MMOL/L (ref 3.5–5.3)
PROT SERPL-MCNC: 7.3 G/DL (ref 6.4–8.4)
RBC # BLD AUTO: 4.67 MILLION/UL (ref 3.81–5.12)
SODIUM SERPL-SCNC: 141 MMOL/L (ref 135–147)
TRIGL SERPL-MCNC: 103 MG/DL
TSH SERPL DL<=0.05 MIU/L-ACNC: 1.97 UIU/ML (ref 0.45–4.5)
WBC # BLD AUTO: 8.52 THOUSAND/UL (ref 4.31–10.16)

## 2022-11-15 RX ORDER — AMMONIUM LACTATE 12 G/100G
CREAM TOPICAL AS NEEDED
Qty: 385 G | Refills: 3 | Status: SHIPPED | OUTPATIENT
Start: 2022-11-15

## 2022-11-15 RX ORDER — ERGOCALCIFEROL 1.25 MG/1
50000 CAPSULE ORAL WEEKLY
Qty: 12 CAPSULE | Refills: 0 | Status: SHIPPED | OUTPATIENT
Start: 2022-11-15 | End: 2023-02-02

## 2022-11-15 NOTE — ASSESSMENT & PLAN NOTE
Continue to follow-up with [place]  Consider establishing with local GI in order to manage chronic diarrhea

## 2022-11-15 NOTE — ASSESSMENT & PLAN NOTE
Stable  Continue to see behavioral health for weekly sessions  Continue with Wellbutrin 150 mg every 12 hours

## 2022-11-15 NOTE — TELEPHONE ENCOUNTER
----- Message from Emani Odonnell, [address] sent at 11/15/2022  1:46 PM EST -----  Please let patient know that her vitamin-D is low, I [place] to [place] on 47974 units of vitamin-D weekly  Her lipid panel went up slightly, educated regarding Mediterranean diet    Everything else looks stable- thank you

## 2022-11-15 NOTE — PROGRESS NOTES
Assessment/Plan:       Problem List Items Addressed This Visit        Genitourinary    Stage 3a chronic kidney disease ([facility] [address] )     Lab Results   Component Value Date    EGFR 78 03/02/2022    EGFR 63 01/06/2022    EGFR 84 12/14/2020    CREATININE 0 87 03/02/2022    CREATININE 1 03 01/06/2022    CREATININE 0 83 12/14/2020   Obtain blood work         Relevant Orders    Comprehensive metabolic panel       Other    Hyperlipidemia    Relevant Orders    Lipid panel    History of colon cancer - Primary     Continue to follow-up with [place]  Consider establishing with local GI in order to manage chronic diarrhea  Depression with anxiety     Stable  Continue to see behavioral health for weekly sessions  Continue with Wellbutrin 150 mg every 12 hours  Other Visit Diagnoses     Healthcare maintenance        Relevant Orders    CBC and differential    Comprehensive metabolic panel    TSH, 3rd generation with Free T4 reflex    Vitamin D 25 hydroxy    Lipid panel    Xerosis of skin        Start using ammonium lactate    Relevant Medications    ammonium lactate (LAC-HYDRIN) 12 % cream            Subjective:      Patient ID: Magdalene Fregoso is a 54 y o  female  Patient presents to the office for a routine follow-up  Has been taking all of the medications as prescribed and denies any adverse effects  Will obtain ordered blood work today  Denies any chest pain, shortness a breath, dizziness or increase in headaches           The following portions of the patient's history were reviewed and updated as appropriate:   Past Medical History:  She has a past medical history of Cancer [facility]), Colon cancer ([facility] [address] ) (2014), History of chemotherapy (2014), History of radiation therapy (2014), History of rectal cancer (2014), History of tear of meniscus of knee joint, Hyperlipidemia, and Tennis elbow ,  _______________________________________________________________________  Medical Problems:  does not have any pertinent problems on file ,  _______________________________________________________________________  Past Surgical History:   has a past surgical history that includes Colon surgery; Meniscectomy (Left, 06/2016); Elbow surgery (Left, 2015); Breast surgery (Bilateral); Knee surgery; Augmentation mammaplasty (Bilateral, 2005); and Colonoscopy (04/2021)  ,  _______________________________________________________________________  Family History:  family history includes BRCA1 Negative in her sister; BRCA2 Negative in her sister; Breast cancer (age of onset: 52) in her sister; Breast cancer (age of onset: 46) in her maternal aunt; Colon cancer in her maternal aunt; Diabetes in her mother; Heart defect in her father; Heart failure in her mother; Hip fracture in her father; Lung cancer (age of onset: 72) in her maternal grandmother; No Known Problems in her paternal aunt, paternal aunt, paternal aunt, paternal aunt, paternal grandmother, and sister  ,  _______________________________________________________________________  Social History:   reports that she quit smoking about 15 years ago  Her smoking use included cigarettes  She has never used smokeless tobacco  She reports current alcohol use  She reports that she does not use drugs  ,  _______________________________________________________________________  Allergies:  is allergic to iodinated diagnostic agents     _______________________________________________________________________  Current Outpatient Medications   Medication Sig Dispense Refill   • ammonium lactate (LAC-HYDRIN) 12 % cream Apply topically as needed for dry skin 385 g 3   • buPROPion (WELLBUTRIN SR) 150 mg 12 hr tablet TAKE 1 TABLET BY MOUTH TWICE A  tablet 1   • rosuvastatin (CRESTOR) 10 MG tablet TAKE 1 TABLET BY MOUTH EVERY DAY 90 tablet 1   • dicyclomine (BENTYL) 10 mg capsule Take 1 capsule (10 mg total) by mouth 3 (three) times a day as needed (diarrhea and abdominal pain) 90 capsule 0     No current facility-administered medications for this visit      _______________________________________________________________________  Review of Systems   Constitutional: Negative for chills and fever  Respiratory: Negative for cough and shortness of breath  Cardiovascular: Negative for chest pain  Gastrointestinal: Positive for diarrhea  Negative for abdominal pain and vomiting  Genitourinary: Negative for dysuria  Musculoskeletal: Negative for arthralgias and back pain  Skin:        Dry skin   Neurological: Negative for headaches  Psychiatric/Behavioral: Positive for sleep disturbance  The patient is nervous/anxious  All other systems reviewed and are negative  Objective:  Vitals:    11/15/22 0715   BP: 110/80   Pulse: 72   Resp: 14   Temp: (!) 96 8 °F (36 °C)   SpO2: 98%   Weight: 67 4 kg (148 lb 9 6 oz)   Height: 5' 4" (1 626 m)     Body mass index is 25 51 kg/m²  Physical Exam  Vitals and nursing note reviewed  Constitutional:       General: She is not in acute distress  Appearance: Normal appearance  She is not ill-appearing  Cardiovascular:      Rate and Rhythm: Normal rate and regular rhythm  Heart sounds: Normal heart sounds  Pulmonary:      Effort: Pulmonary effort is normal       Breath sounds: Normal breath sounds  Musculoskeletal:         General: Normal range of motion  Skin:     General: Skin is warm and dry  Neurological:      Mental Status: She is alert and oriented to person, place, and time  Psychiatric:         Mood and Affect: Mood normal          Behavior: Behavior normal          Thought Content:  Thought content normal          Judgment: Judgment normal

## 2022-11-15 NOTE — PATIENT INSTRUCTIONS
Sleep & Rest:     - Blue light blocking glasses: Studies have shown that exposure to blue light from computer, tablet, and smartphone screens may negatively affect circadian rhythms and sleep  A simple way to protect yourself is by wearing blue light blocking glasses any time you're staring at a screen and especially after 4 pm  You can grab an inexpensive pair online     - Brain Dump: An hour before bedtime, grab a journal and pen and dump out everything that's in your brain  All the lists, concerns, fears, anxiety, and even ideas and leave them there for the next day  Waking up in the middle of the night? Leave a pen and pad near the bed, write down whatever thoughts are racing through your head, and leave them for tomorrow  - Go to bed each night at the same time and wake-up each morning at the same time  If you can, try to start each day with 3 deep breaths and exercise  - Limit Media: I think that media (both social media and the news) ramps up the noise levels in our brains  These negative cycles facilitate neuronal pathways that keep us in a state of anxiety and depression  Be really intentional about the media you consume; physically set limits on your phone for certain websites and choose to check-in only 1 to 3 times per day  - White Noise Machine    - Weighted [employer]    - Try 2 kiwi prior to bed (helps to reduce time to fall asleep and stay asleep  AND Be intentional about resting (rest isn't sleep; it's engaging in activities that bring you energy)  Work it into your schedule! Supplements and teas to try:    Passionflower - very safe and reliable, calming  Used for people who can't sleep as their brain “can't stop the chatter,” people who are prone to be angry or frustrated  Used as tea, tincture or in capsules (250-1000 mg crude herb) 1 hour before bedtime  Valerian - several studies show improvement in sleep quality with 160 mg to 600 mg/day   Some show improved sleep latency and duration of sleep, reduced awakenings and improved insomnia severity score, also available asa tea  Lavender - Silexan (lavender oil capsule preparation)  80 mg of silexan significantly improved quality of sleep  Chamomile - 200 mg twice a day (capsules) may significantly improve sleep latency and sleep quality  Also available as a tea

## 2022-11-15 NOTE — ASSESSMENT & PLAN NOTE
Lab Results   Component Value Date    EGFR 78 03/02/2022    EGFR 63 01/06/2022    EGFR 84 12/14/2020    CREATININE 0 87 03/02/2022    CREATININE 1 03 01/06/2022    CREATININE 0 83 12/14/2020   Obtain blood work

## 2022-11-17 ENCOUNTER — TELEMEDICINE (OUTPATIENT)
Dept: BEHAVIORAL/MENTAL HEALTH CLINIC | Facility: CLINIC | Age: 52
End: 2022-11-17

## 2022-11-17 DIAGNOSIS — F41.9 ANXIETY: Primary | ICD-10-CM

## 2022-11-17 NOTE — PSYCH
Virtual Regular Visit  Therapist met w/pt for individual session  Pt stated that she feels a little less symptoms this week  She stated she feels less irritable and less on edge  Pt stated she got to spend time with her granddaughter this past weekend and had a great time  Pt identified Tuesday being a hard day for her  Therapist and pt discussed what made it hard and ways she could have handled it more effectively  Verification of patient location:    Patient is located in the following state in which I hold an active license [place]      Assessment/Plan: f/u in two weeks    Problem List Items Addressed This Visit        Other    Anxiety - Primary                Reason for visit is No chief complaint on file  Encounter provider Le Sky    Provider located at [facility] [address]  [address]  [facility] [address] [place]      Recent Visits  Date Type Provider Dept   11/10/22 Telemedicine [address] recent visits within past 7 days and meeting all other requirements  Today's Visits  Date Type Provider Dept   11/17/22 Telemedicine [address] today's visits and meeting all other requirements  Future Appointments  No visits were found meeting these conditions  Showing future appointments within next 150 days and meeting all other requirements       The patient was identified by name and date of birth  Sarah Jade was informed that this is a telemedicine visit and that the visit is being conducted throughthe [employer] platform  She agrees to proceed     My office door was closed  No one else was in the room  She acknowledged consent and understanding of privacy and security of the video platform   The patient has agreed to participate and understands they can discontinue the visit at any time  Patient is aware this is a billable service  Subjective  Oliva Villalobos is a 54 y o  female    HPI 45 minutes    Past Medical History:   Diagnosis Date   • Cancer [facility])    • Colon cancer ([facility] [address] ) 2014   • History of chemotherapy 2014    colon ca   • History of radiation therapy 2014    colon ca   • History of rectal cancer 2014    Locally advanced    • History of tear of meniscus of knee joint    • Hyperlipidemia    • Tennis elbow        Past Surgical History:   Procedure Laterality Date   • AUGMENTATION MAMMAPLASTY Bilateral 2005   • BREAST SURGERY Bilateral     RECONSTRUCION WITH IMPLANT PROTHESIS    • COLON SURGERY     • COLONOSCOPY  04/2021   • ELBOW SURGERY Left 2015    Tennis elbow   • KNEE SURGERY     • MENISCECTOMY Left 06/2016       Current Outpatient Medications   Medication Sig Dispense Refill   • ammonium lactate (LAC-HYDRIN) 12 % cream Apply topically as needed for dry skin 385 g 3   • buPROPion (WELLBUTRIN SR) 150 mg 12 hr tablet TAKE 1 TABLET BY MOUTH TWICE A  tablet 1   • dicyclomine (BENTYL) 10 mg capsule Take 1 capsule (10 mg total) by mouth 3 (three) times a day as needed (diarrhea and abdominal pain) 90 capsule 0   • ergocalciferol (VITAMIN D2) 50,000 units Take 1 capsule (50,000 Units total) by mouth once a week 12 capsule 0   • rosuvastatin (CRESTOR) 10 MG tablet TAKE 1 TABLET BY MOUTH EVERY DAY 90 tablet 1     No current facility-administered medications for this visit  Allergies   Allergen Reactions   • Iodinated Diagnostic Agents        Review of Systems    Video Exam    There were no vitals filed for this visit      Physical Exam Pt denied any Si/Hi/Ah/VH

## 2022-11-22 ENCOUNTER — HOSPITAL ENCOUNTER (OUTPATIENT)
Dept: MAMMOGRAPHY | Facility: CLINIC | Age: 52
Discharge: HOME | End: 2022-11-22

## 2022-11-22 VITALS — BODY MASS INDEX: 25.27 KG/M2 | HEIGHT: 64 IN | WEIGHT: 148 LBS

## 2022-11-22 DIAGNOSIS — Z12.31 ENCOUNTER FOR SCREENING MAMMOGRAM FOR MALIGNANT NEOPLASM OF BREAST: ICD-10-CM

## 2022-11-22 DIAGNOSIS — Z80.3 FAMILY HISTORY OF BREAST CANCER IN FIRST DEGREE RELATIVE: ICD-10-CM

## 2022-12-01 ENCOUNTER — TELEMEDICINE (OUTPATIENT)
Dept: BEHAVIORAL/MENTAL HEALTH CLINIC | Facility: CLINIC | Age: 52
End: 2022-12-01

## 2022-12-01 DIAGNOSIS — F41.9 ANXIETY: Primary | ICD-10-CM

## 2022-12-01 NOTE — PSYCH
Virtual Regular Visit  Therapist met w/pt for individual session  Pt identified a decrease in symptoms  She stated she had a good holiday with her family  She stated that she has been setting boundaries w/others and trying to put herself first at times and overall feels less angry, less irritable, and less anxious  Therapist and pt discussed ways to maintain the boundaries she is setting for herself  Verification of patient location:    Patient is located in the following state in which I hold an active license [place]      Assessment/Plan: f/u in one week    Problem List Items Addressed This Visit        Other    Anxiety - Primary              Reason for visit is No chief complaint on file  Encounter provider Grace Barney    Provider located at [facility] [address]  [address]  [facility] [address] [place]      Recent Visits  No visits were found meeting these conditions  Showing recent visits within past 7 days and meeting all other requirements  Today's Visits  Date Type Provider Dept   12/01/22 Telemedicine [address] today's visits and meeting all other requirements  Future Appointments  No visits were found meeting these conditions  Showing future appointments within next 150 days and meeting all other requirements       The patient was identified by name and date of birth  Aditya Hassan was informed that this is a telemedicine visit and that the visit is being conducted throughthe [email or web address] platform  She agrees to proceed     My office door was closed  No one else was in the room  She acknowledged consent and understanding of privacy and security of the video platform  The patient has agreed to participate and understands they can discontinue the visit at any time  Patient is aware this is a billable service       Subjective  Aditya Hassan is a 54 y o  female    HPI     Past Medical History:   Diagnosis Date   • Cancer [facility])    • Colon cancer ([facility] [address] ) 2014   • History of chemotherapy 2014    colon ca   • History of radiation therapy 2014    colon ca   • History of rectal cancer 2014    Locally advanced    • History of tear of meniscus of knee joint    • Hyperlipidemia    • Tennis elbow        Past Surgical History:   Procedure Laterality Date   • AUGMENTATION MAMMAPLASTY Bilateral 2005   • BREAST SURGERY Bilateral     RECONSTRUCION WITH IMPLANT PROTHESIS    • COLON SURGERY     • COLONOSCOPY  04/2021   • ELBOW SURGERY Left 2015    Tennis elbow   • KNEE SURGERY     • MENISCECTOMY Left 06/2016       Current Outpatient Medications   Medication Sig Dispense Refill   • ammonium lactate (LAC-HYDRIN) 12 % cream Apply topically as needed for dry skin 385 g 3   • buPROPion (WELLBUTRIN SR) 150 mg 12 hr tablet TAKE 1 TABLET BY MOUTH TWICE A  tablet 1   • dicyclomine (BENTYL) 10 mg capsule Take 1 capsule (10 mg total) by mouth 3 (three) times a day as needed (diarrhea and abdominal pain) 90 capsule 0   • ergocalciferol (VITAMIN D2) 50,000 units Take 1 capsule (50,000 Units total) by mouth once a week 12 capsule 0   • rosuvastatin (CRESTOR) 10 MG tablet TAKE 1 TABLET BY MOUTH EVERY DAY 90 tablet 1     No current facility-administered medications for this visit  Allergies   Allergen Reactions   • Iodinated Diagnostic Agents        Review of Systems    Video Exam    There were no vitals filed for this visit      Physical Exam  Pt denied any SI/HI/AH/VH

## 2022-12-07 ENCOUNTER — TELEMEDICINE (OUTPATIENT)
Dept: BEHAVIORAL/MENTAL HEALTH CLINIC | Facility: CLINIC | Age: 52
End: 2022-12-07

## 2022-12-07 DIAGNOSIS — F41.9 ANXIETY: Primary | ICD-10-CM

## 2022-12-07 NOTE — PSYCH
Virtual Regular Visit  Therapist met w/pt for individual session  Pt identified feeling less irritable, less stressed  She stated she still has ups and downs but has been able to manage her emotions a little bit better  Therapist and pt discussed how she has been setting firmer boundaries w/others which has helped her stress level  Further discussion was held around things she can do to help her stabilize her emotions  She stated that she spent time with a friend this weekend which did help her a lot emotionally  Verification of patient location:    Patient is located in the following state in which I hold an active license [place]      Assessment/Plan: f/u in one week    Problem List Items Addressed This Visit        Other    Anxiety - Primary                Reason for visit is No chief complaint on file  Encounter provider Betty Quinn    Provider located at [facility] [address]  [address]  [facility] [address] [place]      Recent Visits  Date Type Provider Dept   12/01/22 [place] recent visits within past 7 days and meeting all other requirements  Today's Visits  Date Type Provider Dept   12/07/22 Telemedicine [address] today's visits and meeting all other requirements  Future Appointments  No visits were found meeting these conditions  Showing future appointments within next 150 days and meeting all other requirements       The patient was identified by name and date of birth  Jaylen Tejdea was informed that this is a telemedicine visit and that the visit is being conducted throughthe [employer] platform  She agrees to proceed     My office door was closed  No one else was in the room    She acknowledged consent and understanding of privacy and security of the video platform  The patient has agreed to participate and understands they can discontinue the visit at any time  Patient is aware this is a billable service  Subjective  Francy Palumbo is a 54 y o  female    HPI     Past Medical History:   Diagnosis Date   • Cancer [facility])    • Colon cancer ([facility] [address] ) 2014   • History of chemotherapy 2014    colon ca   • History of radiation therapy 2014    colon ca   • History of rectal cancer 2014    Locally advanced    • History of tear of meniscus of knee joint    • Hyperlipidemia    • Tennis elbow        Past Surgical History:   Procedure Laterality Date   • AUGMENTATION MAMMAPLASTY Bilateral 2005   • BREAST SURGERY Bilateral     RECONSTRUCION WITH IMPLANT PROTHESIS    • COLON SURGERY     • COLONOSCOPY  04/2021   • ELBOW SURGERY Left 2015    Tennis elbow   • KNEE SURGERY     • MENISCECTOMY Left 06/2016       Current Outpatient Medications   Medication Sig Dispense Refill   • ammonium lactate (LAC-HYDRIN) 12 % cream Apply topically as needed for dry skin 385 g 3   • buPROPion (WELLBUTRIN SR) 150 mg 12 hr tablet TAKE 1 TABLET BY MOUTH TWICE A  tablet 1   • dicyclomine (BENTYL) 10 mg capsule Take 1 capsule (10 mg total) by mouth 3 (three) times a day as needed (diarrhea and abdominal pain) 90 capsule 0   • ergocalciferol (VITAMIN D2) 50,000 units Take 1 capsule (50,000 Units total) by mouth once a week 12 capsule 0   • rosuvastatin (CRESTOR) 10 MG tablet TAKE 1 TABLET BY MOUTH EVERY DAY 90 tablet 1     No current facility-administered medications for this visit  Allergies   Allergen Reactions   • Iodinated Diagnostic Agents        Review of Systems    Video Exam    There were no vitals filed for this visit      Physical Exam     Visit Time    Visit Start Time: 7:00 AM  Visit Stop Time: 7:47 AM  Total Visit Duration: 47 minutes

## 2022-12-14 ENCOUNTER — TELEMEDICINE (OUTPATIENT)
Dept: BEHAVIORAL/MENTAL HEALTH CLINIC | Facility: CLINIC | Age: 52
End: 2022-12-14

## 2022-12-14 DIAGNOSIS — F41.8 DEPRESSION WITH ANXIETY: Primary | ICD-10-CM

## 2022-12-14 NOTE — PSYCH
Assessment/Plan: f/u in one week     Diagnoses and all orders for this visit:    Depression with anxiety          Subjective: Therapist met w/pt for individual session  Pt stated that she had a hard day a few days ago and in some ways is still ruminating on it  Therapist and pt discussed different strategies to help w/the ruminating thoughts but pt was able to realize that in some ways she was torturing herself subconsciously because she felt as if she deserved it  Pt shared what happened and how she made a mistake and although she is forgiving of others when they make mistakes she doesn't forgive herself as easily  Therapist and pt reviewed different stress management skills and things she can continue to do to take care of herself  Patient ID: Kylah Bautista is a 54 y o  female  HPI    Review of Systems      Objective: Pt appeared to be anxious but responded well to therapist's interventions        Physical Exam  Pt denied any SI/HI/AH/VH

## 2022-12-16 ENCOUNTER — TELEMEDICINE (OUTPATIENT)
Dept: BEHAVIORAL/MENTAL HEALTH CLINIC | Facility: CLINIC | Age: 52
End: 2022-12-16

## 2022-12-16 DIAGNOSIS — F41.8 DEPRESSION WITH ANXIETY: Primary | ICD-10-CM

## 2022-12-16 NOTE — PSYCH
Virtual Regular Visit  Therapist met w/pt for individual session  Symptoms include: racing thoughts, irritability, mood swings  Pt stated that she put in her resignation of her job yesterday and slept very well last night  Therapist and pt discussed what led up to pt putting in her resignation yesterday and how she has been struggling for several years at the job  Therapist and pt discussed pros/cons of staying and how she feels she has different priorities now and wants to to be with her family  She said ultimately after talking with her loved ones she is "at peace" with this decision  Verification of patient location:    Patient is located in the following state in which I hold an active license [place]      Assessment/Plan: f/u in one week    Problem List Items Addressed This Visit        Other    Depression with anxiety - Primary                Reason for visit is No chief complaint on file  Encounter provider Cris Jordan    Provider located at [facility] [address]  [address]  [facility] [address] [place] [address] [place]      Recent Visits  Date Type Provider Dept   12/14/22 Telemedicine [address] recent visits within past 7 days and meeting all other requirements  Today's Visits  Date Type Provider Dept   12/16/22 Telemedicine [address] today's visits and meeting all other requirements  Future Appointments  No visits were found meeting these conditions  Showing future appointments within next 150 days and meeting all other requirements       The patient was identified by name and date of birth  Leah Ballard was informed that this is a telemedicine visit and that the visit is being conducted throughthe [employer] platform  She agrees to proceed     My office door was closed   No one else was in the room   She acknowledged consent and understanding of privacy and security of the video platform  The patient has agreed to participate and understands they can discontinue the visit at any time  Patient is aware this is a billable service  Subjective  Alexandra Anderson is a 54 y o  female    HPI 45 minutes    Past Medical History:   Diagnosis Date   • Cancer [facility])    • Colon cancer ([facility] [address] ) 2014   • History of chemotherapy 2014    colon ca   • History of radiation therapy 2014    colon ca   • History of rectal cancer 2014    Locally advanced    • History of tear of meniscus of knee joint    • Hyperlipidemia    • Tennis elbow        Past Surgical History:   Procedure Laterality Date   • AUGMENTATION MAMMAPLASTY Bilateral 2005   • BREAST SURGERY Bilateral     RECONSTRUCION WITH IMPLANT PROTHESIS    • COLON SURGERY     • COLONOSCOPY  04/2021   • ELBOW SURGERY Left 2015    Tennis elbow   • KNEE SURGERY     • MENISCECTOMY Left 06/2016       Current Outpatient Medications   Medication Sig Dispense Refill   • ammonium lactate (LAC-HYDRIN) 12 % cream Apply topically as needed for dry skin 385 g 3   • buPROPion (WELLBUTRIN SR) 150 mg 12 hr tablet TAKE 1 TABLET BY MOUTH TWICE A  tablet 1   • dicyclomine (BENTYL) 10 mg capsule Take 1 capsule (10 mg total) by mouth 3 (three) times a day as needed (diarrhea and abdominal pain) 90 capsule 0   • ergocalciferol (VITAMIN D2) 50,000 units Take 1 capsule (50,000 Units total) by mouth once a week 12 capsule 0   • rosuvastatin (CRESTOR) 10 MG tablet TAKE 1 TABLET BY MOUTH EVERY DAY 90 tablet 1     No current facility-administered medications for this visit  Allergies   Allergen Reactions   • Iodinated Diagnostic Agents        Review of Systems    Video Exam    There were no vitals filed for this visit      Physical Exam  Pt denied any SI/HI/AH/VH

## 2022-12-22 ENCOUNTER — TELEMEDICINE (OUTPATIENT)
Dept: BEHAVIORAL/MENTAL HEALTH CLINIC | Facility: CLINIC | Age: 52
End: 2022-12-22

## 2022-12-22 DIAGNOSIS — F41.8 DEPRESSION WITH ANXIETY: Primary | ICD-10-CM

## 2022-12-22 NOTE — PSYCH
Virtual Regular Visit  Therapist met w/pt for individual session  Pt stated she just woke up and is annoyed/irritated  Therapist and pt discussed ways pt can work on managing those feelings and not allowing that to continue into the rest of the day  Pt was able to redirect her thoughts and way of thinking  Pt stated that she is setting good boundaries at work and is proud of herself  She stated that they haven't talked about her resignation but pt is feeling calm about her decision and either way will be okay if the accept it  Therapist and pt discussed how she is trying to focus on the things in her control  Verification of patient location:    Patient is located in the following state in which I hold an active license [place]      Assessment/Plan: f/u in one week    Problem List Items Addressed This Visit        Other    Depression with anxiety - Primary              Reason for visit is No chief complaint on file  Encounter provider Kash Sanchez    Provider located at [facility] [address]  [address]  [place] [facility] [address] [place]      Recent Visits  Date Type Provider Dept   12/16/22 Telemedicine [address] recent visits within past 7 days and meeting all other requirements  Today's Visits  Date Type Provider Dept   12/22/22 Telemedicine [address] today's visits and meeting all other requirements  Future Appointments  No visits were found meeting these conditions  Showing future appointments within next 150 days and meeting all other requirements       The patient was identified by name and date of birth  Andrae Mary was informed that this is a telemedicine visit and that the visit is being conducted throughthe [employer] platform  She agrees to proceed     My office door was closed  No one else was in the room  She acknowledged consent and understanding of privacy and security of the video platform  The patient has agreed to participate and understands they can discontinue the visit at any time  Patient is aware this is a billable service  Subjective  Artem Pitts is a 54 y o  female    HPI     Past Medical History:   Diagnosis Date   • Cancer [facility])    • Colon cancer ([address] ) 2014   • History of chemotherapy 2014    colon ca   • History of radiation therapy 2014    colon ca   • History of rectal cancer 2014    Locally advanced    • History of tear of meniscus of knee joint    • Hyperlipidemia    • Tennis elbow        Past Surgical History:   Procedure Laterality Date   • AUGMENTATION MAMMAPLASTY Bilateral 2005   • BREAST SURGERY Bilateral     RECONSTRUCION WITH IMPLANT PROTHESIS    • COLON SURGERY     • COLONOSCOPY  04/2021   • ELBOW SURGERY Left 2015    Tennis elbow   • KNEE SURGERY     • MENISCECTOMY Left 06/2016       Current Outpatient Medications   Medication Sig Dispense Refill   • ammonium lactate (LAC-HYDRIN) 12 % cream Apply topically as needed for dry skin 385 g 3   • buPROPion (WELLBUTRIN SR) 150 mg 12 hr tablet TAKE 1 TABLET BY MOUTH TWICE A  tablet 1   • dicyclomine (BENTYL) 10 mg capsule Take 1 capsule (10 mg total) by mouth 3 (three) times a day as needed (diarrhea and abdominal pain) 90 capsule 0   • ergocalciferol (VITAMIN D2) 50,000 units Take 1 capsule (50,000 Units total) by mouth once a week 12 capsule 0   • rosuvastatin (CRESTOR) 10 MG tablet TAKE 1 TABLET BY MOUTH EVERY DAY 90 tablet 1     No current facility-administered medications for this visit  Allergies   Allergen Reactions   • Iodinated Diagnostic Agents        Review of Systems    Video Exam    There were no vitals filed for this visit      Physical Exam

## 2023-01-05 ENCOUNTER — TELEMEDICINE (OUTPATIENT)
Dept: BEHAVIORAL/MENTAL HEALTH CLINIC | Facility: CLINIC | Age: 53
End: 2023-01-05

## 2023-01-05 DIAGNOSIS — F41.8 DEPRESSION WITH ANXIETY: Primary | ICD-10-CM

## 2023-01-05 NOTE — PSYCH
Virtual Regular Visit  Therapist met w/pt for individual session  Pt stated she is feeling better physically due to having COVID last week  She identified feeling "numb "  She stated work hsan't been too busy and she is maintaining healthy boundaries at work  She also stated that she has maintained healthy boundaries w/her family which she knows can be a trigger  Therapist and pt discussed two current stressors and how she is working on navigating those stressors  She identified some stress of going into work this month but discussion was held around focusing on that once it gets a little closer to that day/time  Verification of patient location:    Patient is located in the following state in which I hold an active license [place]      Assessment/Plan: f/u in one week    Problem List Items Addressed This Visit        Other    Depression with anxiety - Primary                Reason for visit is No chief complaint on file  Encounter provider Melyssa Singleton    Provider located at [facility] [address]  [address]  [facility] [address] [place]      Recent Visits  No visits were found meeting these conditions  Showing recent visits within past 7 days and meeting all other requirements  Today's Visits  Date Type Provider Dept   01/05/23 Telemedicine [address] today's visits and meeting all other requirements  Future Appointments  No visits were found meeting these conditions  Showing future appointments within next 150 days and meeting all other requirements       The patient was identified by name and date of birth  Stephane Stage was informed that this is a telemedicine visit and that the visit is being conducted throughthe [employer] platform  She agrees to proceed     My office door was closed  No one else was in the room    She acknowledged consent and understanding of privacy and security of the video platform  The patient has agreed to participate and understands they can discontinue the visit at any time  Patient is aware this is a billable service  Raul Muhammad is a 50 y o  female    HPI     Past Medical History:   Diagnosis Date   • Cancer [facility])    • Colon cancer ([address] ) 2014   • History of chemotherapy 2014    colon ca   • History of radiation therapy 2014    colon ca   • History of rectal cancer 2014    Locally advanced    • History of tear of meniscus of knee joint    • Hyperlipidemia    • Tennis elbow        Past Surgical History:   Procedure Laterality Date   • AUGMENTATION MAMMAPLASTY Bilateral 2005   • BREAST SURGERY Bilateral     RECONSTRUCION WITH IMPLANT PROTHESIS    • COLON SURGERY     • COLONOSCOPY  04/2021   • ELBOW SURGERY Left 2015    Tennis elbow   • KNEE SURGERY     • MENISCECTOMY Left 06/2016       Current Outpatient Medications   Medication Sig Dispense Refill   • ammonium lactate (LAC-HYDRIN) 12 % cream Apply topically as needed for dry skin 385 g 3   • buPROPion (WELLBUTRIN SR) 150 mg 12 hr tablet TAKE 1 TABLET BY MOUTH TWICE A  tablet 1   • dicyclomine (BENTYL) 10 mg capsule Take 1 capsule (10 mg total) by mouth 3 (three) times a day as needed (diarrhea and abdominal pain) 90 capsule 0   • ergocalciferol (VITAMIN D2) 50,000 units Take 1 capsule (50,000 Units total) by mouth once a week 12 capsule 0   • rosuvastatin (CRESTOR) 10 MG tablet TAKE 1 TABLET BY MOUTH EVERY DAY 90 tablet 1     No current facility-administered medications for this visit  Allergies   Allergen Reactions   • Iodinated Contrast Media        Review of Systems    Video Exam    There were no vitals filed for this visit      Physical Exam Pt denied any SI/HI/AH/VH    Visit Time    Visit Start Time: 7:00AM  Visit Stop Time: 7:45 AM  Total Visit Duration: 45 minutes

## 2023-01-13 ENCOUNTER — TELEMEDICINE (OUTPATIENT)
Dept: BEHAVIORAL/MENTAL HEALTH CLINIC | Facility: CLINIC | Age: 53
End: 2023-01-13

## 2023-01-13 DIAGNOSIS — F41.8 DEPRESSION WITH ANXIETY: Primary | ICD-10-CM

## 2023-01-16 NOTE — PSYCH
Virtual Regular Visit  Therapist met w/pt for individual session  Pt stated that she has had more mood swings this week  She identified really struggling yesterday emotionally  Therapist and pt discussed what happened and how she was really triggered  Therapist and pt continued to discuss ways to help manage her symptoms  Therapist and pt discussed physical activity and how to work on implementing that due to pt feeling as if that has helped in the past       Verification of patient location:    Patient is located in the following state in which I hold an active license [place]      Assessment/Plan: f/u in one week    Problem List Items Addressed This Visit        Other    Depression with anxiety - Primary                Reason for visit is No chief complaint on file  Encounter provider Tiffany Louis    Provider located at [facility] [address]  [address]  [facility] [address] [place] [address] [place]      Recent Visits  Date Type Provider Dept   01/13/23 Telemedicine [address] recent visits within past 7 days and meeting all other requirements  Future Appointments  No visits were found meeting these conditions  Showing future appointments within next 150 days and meeting all other requirements       The patient was identified by name and date of birth  Giuseppe Danielle was informed that this is a telemedicine visit and that the visit is being conducted throughthe [employer] platform  She agrees to proceed     My office door was closed  No one else was in the room  She acknowledged consent and understanding of privacy and security of the video platform  The patient has agreed to participate and understands they can discontinue the visit at any time  Patient is aware this is a billable service  Subjective  Giuseppe Danielle is a 50 y o  female          HPI     Past Medical History:   Diagnosis Date   • Cancer [facility])    • Colon cancer ([address] ) 2014   • History of chemotherapy 2014    colon ca   • History of radiation therapy 2014    colon ca   • History of rectal cancer 2014    Locally advanced    • History of tear of meniscus of knee joint    • Hyperlipidemia    • Tennis elbow        Past Surgical History:   Procedure Laterality Date   • AUGMENTATION MAMMAPLASTY Bilateral 2005   • BREAST SURGERY Bilateral     RECONSTRUCION WITH IMPLANT PROTHESIS    • COLON SURGERY     • COLONOSCOPY  04/2021   • ELBOW SURGERY Left 2015    Tennis elbow   • KNEE SURGERY     • MENISCECTOMY Left 06/2016       Current Outpatient Medications   Medication Sig Dispense Refill   • ammonium lactate (LAC-HYDRIN) 12 % cream Apply topically as needed for dry skin 385 g 3   • buPROPion (WELLBUTRIN SR) 150 mg 12 hr tablet TAKE 1 TABLET BY MOUTH TWICE A  tablet 1   • dicyclomine (BENTYL) 10 mg capsule Take 1 capsule (10 mg total) by mouth 3 (three) times a day as needed (diarrhea and abdominal pain) 90 capsule 0   • ergocalciferol (VITAMIN D2) 50,000 units Take 1 capsule (50,000 Units total) by mouth once a week 12 capsule 0   • rosuvastatin (CRESTOR) 10 MG tablet TAKE 1 TABLET BY MOUTH EVERY DAY 90 tablet 1     No current facility-administered medications for this visit  Allergies   Allergen Reactions   • Iodinated Contrast Media        Review of Systems    Video Exam    There were no vitals filed for this visit      Physical Exam Pt denied any SI/HI/Ah/Vh

## 2023-01-20 ENCOUNTER — TELEMEDICINE (OUTPATIENT)
Dept: BEHAVIORAL/MENTAL HEALTH CLINIC | Facility: CLINIC | Age: 53
End: 2023-01-20

## 2023-01-20 DIAGNOSIS — F41.8 DEPRESSION WITH ANXIETY: Primary | ICD-10-CM

## 2023-01-20 NOTE — PSYCH
Virtual Regular Visit   Therapist met w/pt for individual session  Pt stated that she is done "complaining" and ready to take steps to move forward  Therapist and pt discussed what these steps will look like and what she has been practicing to help manage her triggers  Pt stated that she knows it is going to be difficult but is hoping by implementing some of these changes daily she will be able to last a little longer at her job  Therapist and pt discussed pt going into the office next week and different ways to ground herself when she does get triggered  Verification of patient location:    Patient is located in the following state in which I hold an active license [place]      Assessment/Plan: f/u in one week    Problem List Items Addressed This Visit        Other    Depression with anxiety - Primary              Reason for visit is No chief complaint on file  Encounter provider Jackie Molina    Provider located at [facility] [address]  [address]  [facility] [address] [place]      Recent Visits  Date Type Provider Dept   01/13/23 [place] recent visits within past 7 days and meeting all other requirements  Today's Visits  Date Type Provider Dept   01/20/23 Telemedicine [address] today's visits and meeting all other requirements  Future Appointments  No visits were found meeting these conditions  Showing future appointments within next 150 days and meeting all other requirements       The patient was identified by name and date of birth  Yenny Baker was informed that this is a telemedicine visit and that the visit is being conducted throughthe [employer] platform  She agrees to proceed     My office door was closed  No one else was in the room    She acknowledged consent and understanding of privacy and security of the video platform  The patient has agreed to participate and understands they can discontinue the visit at any time  Patient is aware this is a billable service  Subjective  Artem Pitts is a 50 y o  female    HPI     Past Medical History:   Diagnosis Date   • Cancer [facility])    • Colon cancer ([facility] [address] ) 2014   • History of chemotherapy 2014    colon ca   • History of radiation therapy 2014    colon ca   • History of rectal cancer 2014    Locally advanced    • History of tear of meniscus of knee joint    • Hyperlipidemia    • Tennis elbow        Past Surgical History:   Procedure Laterality Date   • AUGMENTATION MAMMAPLASTY Bilateral 2005   • BREAST SURGERY Bilateral     RECONSTRUCION WITH IMPLANT PROTHESIS    • COLON SURGERY     • COLONOSCOPY  04/2021   • ELBOW SURGERY Left 2015    Tennis elbow   • KNEE SURGERY     • MENISCECTOMY Left 06/2016       Current Outpatient Medications   Medication Sig Dispense Refill   • ammonium lactate (LAC-HYDRIN) 12 % cream Apply topically as needed for dry skin 385 g 3   • buPROPion (WELLBUTRIN SR) 150 mg 12 hr tablet TAKE 1 TABLET BY MOUTH TWICE A  tablet 1   • dicyclomine (BENTYL) 10 mg capsule Take 1 capsule (10 mg total) by mouth 3 (three) times a day as needed (diarrhea and abdominal pain) 90 capsule 0   • ergocalciferol (VITAMIN D2) 50,000 units Take 1 capsule (50,000 Units total) by mouth once a week 12 capsule 0   • rosuvastatin (CRESTOR) 10 MG tablet TAKE 1 TABLET BY MOUTH EVERY DAY 90 tablet 1     No current facility-administered medications for this visit  Allergies   Allergen Reactions   • Iodinated Contrast Media        Review of Systems    Video Exam    There were no vitals filed for this visit      Physical Exam  Pt denied any SI/HI/AH/VH    Visit Time    Visit Start Time: 7:00 AM  Visit Stop Time: 7:47 A  Total Visit Duration: 47 minutes

## 2023-01-27 ENCOUNTER — TELEMEDICINE (OUTPATIENT)
Dept: BEHAVIORAL/MENTAL HEALTH CLINIC | Facility: CLINIC | Age: 53
End: 2023-01-27

## 2023-01-27 DIAGNOSIS — F41.8 DEPRESSION WITH ANXIETY: Primary | ICD-10-CM

## 2023-01-27 NOTE — PSYCH
Virtual Regular Visit  Therapist met w/pt for individual session  Pt stated she continues to struggle w/anxiety: racing thoughts, irritability, mood swings, depression  She stated she did go into work this week and discussion was held around how she felt  Therapist and pt discussed ongoing strategies to help pt manage her symptoms despite her current work situation  Verification of patient location:    Patient is located in the following state in which I hold an active license [place]      Assessment/Plan: f/u in one week    Problem List Items Addressed This Visit        Other    Depression with anxiety - Primary                Reason for visit is No chief complaint on file  Encounter provider Naomi Razo    Provider located at [facility] [address]  [address]  [facility] [address] [place] [address] [place]      Recent Visits  Date Type Provider Dept   01/20/23 [place] recent visits within past 7 days and meeting all other requirements  Today's Visits  Date Type Provider Dept   01/27/23 Telemedicine [address] today's visits and meeting all other requirements  Future Appointments  No visits were found meeting these conditions  Showing future appointments within next 150 days and meeting all other requirements       The patient was identified by name and date of birth  Rafita Biggs was informed that this is a telemedicine visit and that the visit is being conducted throughthe [employer] platform  She agrees to proceed     My office door was closed  No one else was in the room  She acknowledged consent and understanding of privacy and security of the video platform  The patient has agreed to participate and understands they can discontinue the visit at any time      Patient is aware this is a billable service  Subjective  Rosales Hanna is a 50 y o  female    HPI     Past Medical History:   Diagnosis Date   • Cancer [facility])    • Colon cancer ([address] ) 2014   • History of chemotherapy 2014    colon ca   • History of radiation therapy 2014    colon ca   • History of rectal cancer 2014    Locally advanced    • History of tear of meniscus of knee joint    • Hyperlipidemia    • Tennis elbow        Past Surgical History:   Procedure Laterality Date   • AUGMENTATION MAMMAPLASTY Bilateral 2005   • BREAST SURGERY Bilateral     RECONSTRUCION WITH IMPLANT PROTHESIS    • COLON SURGERY     • COLONOSCOPY  04/2021   • ELBOW SURGERY Left 2015    Tennis elbow   • KNEE SURGERY     • MENISCECTOMY Left 06/2016       Current Outpatient Medications   Medication Sig Dispense Refill   • ammonium lactate (LAC-HYDRIN) 12 % cream Apply topically as needed for dry skin 385 g 3   • buPROPion (WELLBUTRIN SR) 150 mg 12 hr tablet TAKE 1 TABLET BY MOUTH TWICE A  tablet 1   • dicyclomine (BENTYL) 10 mg capsule Take 1 capsule (10 mg total) by mouth 3 (three) times a day as needed (diarrhea and abdominal pain) 90 capsule 0   • ergocalciferol (VITAMIN D2) 50,000 units Take 1 capsule (50,000 Units total) by mouth once a week 12 capsule 0   • rosuvastatin (CRESTOR) 10 MG tablet TAKE 1 TABLET BY MOUTH EVERY DAY 90 tablet 1     No current facility-administered medications for this visit  Allergies   Allergen Reactions   • Iodinated Contrast Media        Review of Systems    Video Exam    There were no vitals filed for this visit      Physical Exam     Visit Time    Visit Start Time: 7:02 AM  Visit Stop Time: 7:48 AM  Total Visit Duration: 46 minutes

## 2023-02-02 DIAGNOSIS — E55.9 VITAMIN D DEFICIENCY: ICD-10-CM

## 2023-02-02 RX ORDER — ERGOCALCIFEROL 1.25 MG/1
CAPSULE ORAL
Qty: 4 CAPSULE | Refills: 2 | Status: SHIPPED | OUTPATIENT
Start: 2023-02-02

## 2023-02-03 ENCOUNTER — TELEMEDICINE (OUTPATIENT)
Dept: BEHAVIORAL/MENTAL HEALTH CLINIC | Facility: CLINIC | Age: 53
End: 2023-02-03

## 2023-02-03 DIAGNOSIS — F41.8 DEPRESSION WITH ANXIETY: Primary | ICD-10-CM

## 2023-02-09 ENCOUNTER — TELEMEDICINE (OUTPATIENT)
Dept: BEHAVIORAL/MENTAL HEALTH CLINIC | Facility: CLINIC | Age: 53
End: 2023-02-09

## 2023-02-09 DIAGNOSIS — F41.8 DEPRESSION WITH ANXIETY: Primary | ICD-10-CM

## 2023-02-10 NOTE — PSYCH
Virtual Regular Visit  Therapist met w/pt for individual session  Pt stated that she has taken off the past few days to increased symptoms  She stated that she was struggling w/sleep, having difficulty keeping food down, heart palpitations, increased anger  Therapist and pt discussed ongoing stressors at her job and the difficulty with managing these stressors  Pt stated that she is taking this time to focus on herself and figure out what she needs to do to feel less stuck/trapped  Therapist and pt discussed different grounding techniques as well as actions steps she can take  Verification of patient location:    Patient is located in the following state in which I hold an active license [place]      Assessment/Plan: f/u in one week    Problem List Items Addressed This Visit    None         Reason for visit is No chief complaint on file  Encounter provider Zeeshan Woods    Provider located at [facility] [address]  [address]  [facility] [address] [place] [address] [place]      Recent Visits  Date Type Provider Dept   02/09/23 Telemedicine [address] recent visits within past 7 days and meeting all other requirements  Future Appointments  No visits were found meeting these conditions  Showing future appointments within next 150 days and meeting all other requirements       The patient was identified by name and date of birth  Rosalesnasreen Hanna was informed that this is a telemedicine visit and that the visit is being conducted throughthe [employer] platform  She agrees to proceed     My office door was closed  No one else was in the room  She acknowledged consent and understanding of privacy and security of the video platform  The patient has agreed to participate and understands they can discontinue the visit at any time      Patient is aware this is a billable service  Subjective  Giovanny Welch is a 50 y o  female    HPI     Past Medical History:   Diagnosis Date   • Cancer [facility])    • Colon cancer ([address] ) 2014   • History of chemotherapy 2014    colon ca   • History of radiation therapy 2014    colon ca   • History of rectal cancer 2014    Locally advanced    • History of tear of meniscus of knee joint    • Hyperlipidemia    • Tennis elbow        Past Surgical History:   Procedure Laterality Date   • AUGMENTATION MAMMAPLASTY Bilateral 2005   • BREAST SURGERY Bilateral     RECONSTRUCION WITH IMPLANT PROTHESIS    • COLON SURGERY     • COLONOSCOPY  04/2021   • ELBOW SURGERY Left 2015    Tennis elbow   • KNEE SURGERY     • MENISCECTOMY Left 06/2016       Current Outpatient Medications   Medication Sig Dispense Refill   • ammonium lactate (LAC-HYDRIN) 12 % cream Apply topically as needed for dry skin 385 g 3   • buPROPion (WELLBUTRIN SR) 150 mg 12 hr tablet TAKE 1 TABLET BY MOUTH TWICE A  tablet 1   • dicyclomine (BENTYL) 10 mg capsule Take 1 capsule (10 mg total) by mouth 3 (three) times a day as needed (diarrhea and abdominal pain) 90 capsule 0   • ergocalciferol (VITAMIN D2) 50,000 units TAKE 1 CAPSULE BY MOUTH ONE TIME PER WEEK 4 capsule 2   • rosuvastatin (CRESTOR) 10 MG tablet TAKE 1 TABLET BY MOUTH EVERY DAY 90 tablet 1     No current facility-administered medications for this visit  Allergies   Allergen Reactions   • Iodinated Contrast Media        Review of Systems    Video Exam    There were no vitals filed for this visit      Physical Exam Pt denied any SI/HI/AH/VH    Visit Time    Visit Start Time: 11:20AM  Visit Stop Time: 12:0 0PM  Total Visit Duration: 40 minutes

## 2023-02-16 ENCOUNTER — TELEMEDICINE (OUTPATIENT)
Dept: BEHAVIORAL/MENTAL HEALTH CLINIC | Facility: CLINIC | Age: 53
End: 2023-02-16

## 2023-02-16 DIAGNOSIS — F41.8 DEPRESSION WITH ANXIETY: Primary | ICD-10-CM

## 2023-02-16 NOTE — PSYCH
Virtual Regular Visit  Therapist met w/pt for individual session  Symptoms include: heart palpitations, racing thoughts, irritability, mood swings  Pt stated that she had a good few days off and was feeling good on Monday and then as the week went on she began to struggle again emotionally  Pt stated she has been doing a good job at maintaining healthy boundaries and hasn't quit but has still felt like an emotional rollercoaster  Therapist and pt discussed ways she can work on managing the rollercoaster a little bit better  Pt stated that therapy is helpful to ground her and is a healthy outlet for her  Verification of patient location:    Patient is located in the following state in which I hold an active license [place]      Assessment/Plan: f/u in one week    Problem List Items Addressed This Visit        Other    Depression with anxiety - Primary            Reason for visit is No chief complaint on file  Encounter provider Jerald Walton    Provider located at [facility] [address]  [address]  [facility] [address] [place] [address] [place]      Recent Visits  Date Type Provider Dept   02/09/23 [place] recent visits within past 7 days and meeting all other requirements  Today's Visits  Date Type Provider Dept   02/16/23 Telemedicine [address] today's visits and meeting all other requirements  Future Appointments  No visits were found meeting these conditions  Showing future appointments within next 150 days and meeting all other requirements       The patient was identified by name and date of birth  Ty Coy was informed that this is a telemedicine visit and that the visit is being conducted throughthe [employer] platform  She agrees to proceed     My office door was closed   No one else was in the room  She acknowledged consent and understanding of privacy and security of the video platform  The patient has agreed to participate and understands they can discontinue the visit at any time  Patient is aware this is a billable service  Subjective  Nita Doe is a 50 y o  female    HPI     Past Medical History:   Diagnosis Date   • Cancer [facility])    • Colon cancer ([facility] [address] ) 2014   • History of chemotherapy 2014    colon ca   • History of radiation therapy 2014    colon ca   • History of rectal cancer 2014    Locally advanced    • History of tear of meniscus of knee joint    • Hyperlipidemia    • Tennis elbow        Past Surgical History:   Procedure Laterality Date   • AUGMENTATION MAMMAPLASTY Bilateral 2005   • BREAST SURGERY Bilateral     RECONSTRUCION WITH IMPLANT PROTHESIS    • COLON SURGERY     • COLONOSCOPY  04/2021   • ELBOW SURGERY Left 2015    Tennis elbow   • KNEE SURGERY     • MENISCECTOMY Left 06/2016       Current Outpatient Medications   Medication Sig Dispense Refill   • ammonium lactate (LAC-HYDRIN) 12 % cream Apply topically as needed for dry skin 385 g 3   • buPROPion (WELLBUTRIN SR) 150 mg 12 hr tablet TAKE 1 TABLET BY MOUTH TWICE A  tablet 1   • dicyclomine (BENTYL) 10 mg capsule Take 1 capsule (10 mg total) by mouth 3 (three) times a day as needed (diarrhea and abdominal pain) 90 capsule 0   • ergocalciferol (VITAMIN D2) 50,000 units TAKE 1 CAPSULE BY MOUTH ONE TIME PER WEEK 4 capsule 2   • rosuvastatin (CRESTOR) 10 MG tablet TAKE 1 TABLET BY MOUTH EVERY DAY 90 tablet 1     No current facility-administered medications for this visit  Allergies   Allergen Reactions   • Iodinated Contrast Media        Review of Systems    Video Exam    There were no vitals filed for this visit      Physical Exam     Visit Time    Visit Start Time: 7:00 AM  Visit Stop Time: 7:43 AM  Total Visit Duration: 43 minutes

## 2023-02-23 ENCOUNTER — TELEMEDICINE (OUTPATIENT)
Dept: BEHAVIORAL/MENTAL HEALTH CLINIC | Facility: CLINIC | Age: 53
End: 2023-02-23

## 2023-02-23 DIAGNOSIS — F41.8 DEPRESSION WITH ANXIETY: Primary | ICD-10-CM

## 2023-02-24 NOTE — PSYCH
Virtual Regular Visit  Therapist met w/pt for individual session  Pt stated that she resigned on Tuesday  She stated she meets today w/HR to have her exit interview  Therapist and pt discussed the relief she felt after resigning and how she has been struggling for years emotionally trying to figure out how to make this work  Pt stated that she is feeling anxious about the future but also realizing that this is the best thing to do for herself right now  Verification of patient location:    Patient is located in the following state in which I hold an active license [place]      Assessment/Plan: f/u in one week    Problem List Items Addressed This Visit        Other    Depression with anxiety - Primary          Reason for visit is No chief complaint on file  Encounter provider Agnieszka Lockett    Provider located at [facility] [address]  [address]  [facility] [address] [place] [address] [place]      Recent Visits  Date Type Provider Dept   02/23/23 Telemedicine [address] recent visits within past 7 days and meeting all other requirements  Future Appointments  No visits were found meeting these conditions  Showing future appointments within next 150 days and meeting all other requirements       The patient was identified by name and date of birth  Hermelinda Casiano was informed that this is a telemedicine visit and that the visit is being conducted throughthe [employer] platform  She agrees to proceed     My office door was closed  No one else was in the room  She acknowledged consent and understanding of privacy and security of the video platform  The patient has agreed to participate and understands they can discontinue the visit at any time  Patient is aware this is a billable service  Subjective  Hermelinda Casiano is a 50 y o  female          HPI     Past Medical History:   Diagnosis Date   • Cancer [facility])    • Colon cancer ([address] ) 2014   • History of chemotherapy 2014    colon ca   • History of radiation therapy 2014    colon ca   • History of rectal cancer 2014    Locally advanced    • History of tear of meniscus of knee joint    • Hyperlipidemia    • Tennis elbow        Past Surgical History:   Procedure Laterality Date   • AUGMENTATION MAMMAPLASTY Bilateral 2005   • BREAST SURGERY Bilateral     RECONSTRUCION WITH IMPLANT PROTHESIS    • COLON SURGERY     • COLONOSCOPY  04/2021   • ELBOW SURGERY Left 2015    Tennis elbow   • KNEE SURGERY     • MENISCECTOMY Left 06/2016       Current Outpatient Medications   Medication Sig Dispense Refill   • ammonium lactate (LAC-HYDRIN) 12 % cream Apply topically as needed for dry skin 385 g 3   • buPROPion (WELLBUTRIN SR) 150 mg 12 hr tablet TAKE 1 TABLET BY MOUTH TWICE A  tablet 1   • dicyclomine (BENTYL) 10 mg capsule Take 1 capsule (10 mg total) by mouth 3 (three) times a day as needed (diarrhea and abdominal pain) 90 capsule 0   • ergocalciferol (VITAMIN D2) 50,000 units TAKE 1 CAPSULE BY MOUTH ONE TIME PER WEEK 4 capsule 2   • rosuvastatin (CRESTOR) 10 MG tablet TAKE 1 TABLET BY MOUTH EVERY DAY 90 tablet 1     No current facility-administered medications for this visit  Allergies   Allergen Reactions   • Iodinated Contrast Media        Review of Systems    Video Exam    There were no vitals filed for this visit      Physical Exam     Visit Time    Visit Start Time: 7:10AM  Visit Stop Time: 7:45 AM  Total Visit Duration: 35 minutes

## 2023-02-27 ENCOUNTER — TELEMEDICINE (OUTPATIENT)
Dept: BEHAVIORAL/MENTAL HEALTH CLINIC | Facility: CLINIC | Age: 53
End: 2023-02-27

## 2023-02-27 DIAGNOSIS — F41.8 DEPRESSION WITH ANXIETY: Primary | ICD-10-CM

## 2023-02-27 NOTE — PSYCH
Virtual Regular Visit  Therapist met w/pt for individual session  Pt stated that she continues to struggle emotionally  She stated that she is anxious because she has resigned from her job and doesn't have a back up plan financially  She expressed the job and how much it has affected her and how many ways she tried to problem solve and ask for help  Therapist and pt discussed numerous strategies she has tried individually as well as in sessions to make her work situation better  Pt stated that although she is struggling emotionally now she knows that these emotions will improve and will feel relief from not working at this job  Therapist and pt discussed strategies for pt to work on implementing to help her make it through to her last day next Tuesday  Verification of patient location:    Patient is located in the following state in which I hold an active license [place]      Assessment/Plan: f/u in one week    Problem List Items Addressed This Visit        Other    Depression with anxiety - Primary            Reason for visit is No chief complaint on file  Encounter provider Sobeida Grant    Provider located at [facility] [address]  [address]  [facility] [address] [place]      Recent Visits  Date Type Provider Dept   02/23/23 [place] recent visits within past 7 days and meeting all other requirements  Today's Visits  Date Type Provider Dept   02/27/23 Telemedicine [address] today's visits and meeting all other requirements  Future Appointments  No visits were found meeting these conditions  Showing future appointments within next 150 days and meeting all other requirements       The patient was identified by name and date of birth   Austin Villalobos was informed that this is a telemedicine visit and that the visit is being conducted throughthe [employer] platform  She agrees to proceed     My office door was closed  No one else was in the room  She acknowledged consent and understanding of privacy and security of the video platform  The patient has agreed to participate and understands they can discontinue the visit at any time  Patient is aware this is a billable service  Subjective  Carol Gutierrez is a 50 y o  female    HPI     Past Medical History:   Diagnosis Date   • Cancer [facility])    • Colon cancer ([facility] [address] ) 2014   • History of chemotherapy 2014    colon ca   • History of radiation therapy 2014    colon ca   • History of rectal cancer 2014    Locally advanced    • History of tear of meniscus of knee joint    • Hyperlipidemia    • Tennis elbow        Past Surgical History:   Procedure Laterality Date   • AUGMENTATION MAMMAPLASTY Bilateral 2005   • BREAST SURGERY Bilateral     RECONSTRUCION WITH IMPLANT PROTHESIS    • COLON SURGERY     • COLONOSCOPY  04/2021   • ELBOW SURGERY Left 2015    Tennis elbow   • KNEE SURGERY     • MENISCECTOMY Left 06/2016       Current Outpatient Medications   Medication Sig Dispense Refill   • ammonium lactate (LAC-HYDRIN) 12 % cream Apply topically as needed for dry skin 385 g 3   • buPROPion (WELLBUTRIN SR) 150 mg 12 hr tablet TAKE 1 TABLET BY MOUTH TWICE A  tablet 1   • dicyclomine (BENTYL) 10 mg capsule Take 1 capsule (10 mg total) by mouth 3 (three) times a day as needed (diarrhea and abdominal pain) 90 capsule 0   • ergocalciferol (VITAMIN D2) 50,000 units TAKE 1 CAPSULE BY MOUTH ONE TIME PER WEEK 4 capsule 2   • rosuvastatin (CRESTOR) 10 MG tablet TAKE 1 TABLET BY MOUTH EVERY DAY 90 tablet 1     No current facility-administered medications for this visit  Allergies   Allergen Reactions   • Iodinated Contrast Media        Review of Systems    Video Exam    There were no vitals filed for this visit      Physical Exam     Visit Time    Visit Start Time: 7:00 AM  Visit Stop Time: 7:46 AM  Total Visit Duration: 46 minutes

## 2023-02-27 NOTE — PSYCH
Virtual Regular Visit  Therapist met w/pt for individual session  Pt stated that she continues to struggle emotionally  She stated that she continues to feel like she is on an emotional rollercoaster  Therapist and pt continued to discuss ways she can continue to work on managing the stress but even when pt sets healthy boundaries it makes more stress  Pt shared that she continues to feel attacked by her job and not supported but is trying to problem solve and figure out a way to make this work due ot enjoying what she does  Verification of patient location:    Patient is located in the following state in which I hold an active license [place]      Assessment/Plan: f/u in one week    Problem List Items Addressed This Visit        Other    Depression with anxiety - Primary                Reason for visit is No chief complaint on file  Encounter provider Evangelina Goetz    Provider located at [facility] [address]  [address]  [facility] [address] [place] [address] [place]      Recent Visits  Date Type Provider Dept   02/23/23 Telemedicine [address] recent visits within past 7 days and meeting all other requirements  Future Appointments  No visits were found meeting these conditions  Showing future appointments within next 150 days and meeting all other requirements       The patient was identified by name and date of birth  Fernandez Deleon was informed that this is a telemedicine visit and that the visit is being conducted throughthe [employer] platform  She agrees to proceed     My office door was closed  No one else was in the room  She acknowledged consent and understanding of privacy and security of the video platform  The patient has agreed to participate and understands they can discontinue the visit at any time  Patient is aware this is a billable service  Raul Silva is a 50 y o  female    HPI     Past Medical History:   Diagnosis Date   • Cancer [facility])    • Colon cancer ([facility] [address] ) 2014   • History of chemotherapy 2014    colon ca   • History of radiation therapy 2014    colon ca   • History of rectal cancer 2014    Locally advanced    • History of tear of meniscus of knee joint    • Hyperlipidemia    • Tennis elbow        Past Surgical History:   Procedure Laterality Date   • AUGMENTATION MAMMAPLASTY Bilateral 2005   • BREAST SURGERY Bilateral     RECONSTRUCION WITH IMPLANT PROTHESIS    • COLON SURGERY     • COLONOSCOPY  04/2021   • ELBOW SURGERY Left 2015    Tennis elbow   • KNEE SURGERY     • MENISCECTOMY Left 06/2016       Current Outpatient Medications   Medication Sig Dispense Refill   • ammonium lactate (LAC-HYDRIN) 12 % cream Apply topically as needed for dry skin 385 g 3   • buPROPion (WELLBUTRIN SR) 150 mg 12 hr tablet TAKE 1 TABLET BY MOUTH TWICE A  tablet 1   • dicyclomine (BENTYL) 10 mg capsule Take 1 capsule (10 mg total) by mouth 3 (three) times a day as needed (diarrhea and abdominal pain) 90 capsule 0   • ergocalciferol (VITAMIN D2) 50,000 units TAKE 1 CAPSULE BY MOUTH ONE TIME PER WEEK 4 capsule 2   • rosuvastatin (CRESTOR) 10 MG tablet TAKE 1 TABLET BY MOUTH EVERY DAY 90 tablet 1     No current facility-administered medications for this visit  Allergies   Allergen Reactions   • Iodinated Contrast Media        Review of Systems    Video Exam    There were no vitals filed for this visit      Physical Exam     Visit Time    Visit Start Time: 7:00 AM  Visit Stop Time: 7:45 AM  Total Visit Duration: 45 minutes

## 2023-03-08 ENCOUNTER — TELEMEDICINE (OUTPATIENT)
Dept: BEHAVIORAL/MENTAL HEALTH CLINIC | Facility: CLINIC | Age: 53
End: 2023-03-08

## 2023-03-08 DIAGNOSIS — F41.9 ANXIETY: Primary | ICD-10-CM

## 2023-03-08 NOTE — PSYCH
Virtual Regular Visit  Therapist met w/pt for individual session  Pt shared that yesterday was her last day of work  She expressed feeling a lot of different emotions but realizing she did the best thing for herself  Therapist and pt discussed grief and the stages of grief due to pt being at this job for over 20 years  Therapist and pt discussed importance of taking this time to relax and figure out what she wants to do  Pt stated she wants to take this time to relax and enjoy her time w/her family  She stated she is feeling less anxious knowing she is not working there and figuring out who she is outside of her job  Verification of patient location:    Patient is located in the following state in which I hold an active license [place]      Assessment/Plan: f/u in a week    Problem List Items Addressed This Visit        Other    Anxiety - Primary            Reason for visit is No chief complaint on file  Encounter provider Beth Luong    Provider located at [facility] [address]  [address]  [facility] [address] [place] [address] [place]      Recent Visits  No visits were found meeting these conditions  Showing recent visits within past 7 days and meeting all other requirements  Today's Visits  Date Type Provider Dept   03/08/23 Telemedicine [address] today's visits and meeting all other requirements  Future Appointments  No visits were found meeting these conditions  Showing future appointments within next 150 days and meeting all other requirements       The patient was identified by name and date of birth  Niharika Anna was informed that this is a telemedicine visit and that the visit is being conducted throughthe [employer] platform  She agrees to proceed     My office door was closed  No one else was in the room    She acknowledged consent and understanding of privacy and security of the video platform  The patient has agreed to participate and understands they can discontinue the visit at any time  Patient is aware this is a billable service  Subjective  Tenzin Laird is a 50 y o  female    HPI     Past Medical History:   Diagnosis Date   • Cancer [facility])    • Colon cancer ([address] ) 2014   • History of chemotherapy 2014    colon ca   • History of radiation therapy 2014    colon ca   • History of rectal cancer 2014    Locally advanced    • History of tear of meniscus of knee joint    • Hyperlipidemia    • Tennis elbow        Past Surgical History:   Procedure Laterality Date   • AUGMENTATION MAMMAPLASTY Bilateral 2005   • BREAST SURGERY Bilateral     RECONSTRUCION WITH IMPLANT PROTHESIS    • COLON SURGERY     • COLONOSCOPY  04/2021   • ELBOW SURGERY Left 2015    Tennis elbow   • KNEE SURGERY     • MENISCECTOMY Left 06/2016       Current Outpatient Medications   Medication Sig Dispense Refill   • ammonium lactate (LAC-HYDRIN) 12 % cream Apply topically as needed for dry skin 385 g 3   • buPROPion (WELLBUTRIN SR) 150 mg 12 hr tablet TAKE 1 TABLET BY MOUTH TWICE A  tablet 1   • dicyclomine (BENTYL) 10 mg capsule Take 1 capsule (10 mg total) by mouth 3 (three) times a day as needed (diarrhea and abdominal pain) 90 capsule 0   • ergocalciferol (VITAMIN D2) 50,000 units TAKE 1 CAPSULE BY MOUTH ONE TIME PER WEEK 4 capsule 2   • rosuvastatin (CRESTOR) 10 MG tablet TAKE 1 TABLET BY MOUTH EVERY DAY 90 tablet 1     No current facility-administered medications for this visit  Allergies   Allergen Reactions   • Iodinated Contrast Media        Review of Systems    Video Exam    There were no vitals filed for this visit      Physical Exam     Visit Time    Visit Start Time: 7:00am  Visit Stop Time: 7:42 AM  Total Visit Duration: 42 minutes

## 2023-03-15 ENCOUNTER — TELEMEDICINE (OUTPATIENT)
Dept: BEHAVIORAL/MENTAL HEALTH CLINIC | Facility: CLINIC | Age: 53
End: 2023-03-15

## 2023-03-15 DIAGNOSIS — F41.9 ANXIETY: Primary | ICD-10-CM

## 2023-03-15 NOTE — PSYCH
Virtual Regular Visit  Therapist met w/pt for individual session  Pt stated that she has been staying busy and overall feels a lot better  She stated that she has had bouts of sadness and has had some panic attacks but overall symptoms have decreased  Pt stated that having some structure throughout the day is helpful and overall feels a lot of relief  She stated she is able to keep food down a lot better and isn't feeling as irritable  Therapist and pt continued to discuss strategies to help manage her symptoms  Verification of patient location:    Patient is located in the following state in which I hold an active license [place]      Assessment/Plan: f/u in one week    Problem List Items Addressed This Visit        Other    Anxiety - Primary            Reason for visit is No chief complaint on file  Encounter provider Aicha Moeller    Provider located at [facility] [address]  [address]  [facility] [address] [place] [address] [place]      Recent Visits  Date Type Provider Dept   03/08/23 [place] recent visits within past 7 days and meeting all other requirements  Today's Visits  Date Type Provider Dept   03/15/23 Telemedicine [address] today's visits and meeting all other requirements  Future Appointments  No visits were found meeting these conditions  Showing future appointments within next 150 days and meeting all other requirements       The patient was identified by name and date of birth  Ezra Larose was informed that this is a telemedicine visit and that the visit is being conducted throughthe [employer] platform  She agrees to proceed     My office door was closed  No one else was in the room    She acknowledged consent and understanding of privacy and security of the video platform  The patient has agreed to participate and understands they can discontinue the visit at any time  Patient is aware this is a billable service  Subjective  Ezra Larose is a 50 y o  female    HPI     Past Medical History:   Diagnosis Date   • Cancer [facility])    • Colon cancer ([address] ) 2014   • History of chemotherapy 2014    colon ca   • History of radiation therapy 2014    colon ca   • History of rectal cancer 2014    Locally advanced    • History of tear of meniscus of knee joint    • Hyperlipidemia    • Tennis elbow        Past Surgical History:   Procedure Laterality Date   • AUGMENTATION MAMMAPLASTY Bilateral 2005   • BREAST SURGERY Bilateral     RECONSTRUCION WITH IMPLANT PROTHESIS    • COLON SURGERY     • COLONOSCOPY  04/2021   • ELBOW SURGERY Left 2015    Tennis elbow   • KNEE SURGERY     • MENISCECTOMY Left 06/2016       Current Outpatient Medications   Medication Sig Dispense Refill   • ammonium lactate (LAC-HYDRIN) 12 % cream Apply topically as needed for dry skin 385 g 3   • buPROPion (WELLBUTRIN SR) 150 mg 12 hr tablet TAKE 1 TABLET BY MOUTH TWICE A  tablet 1   • dicyclomine (BENTYL) 10 mg capsule Take 1 capsule (10 mg total) by mouth 3 (three) times a day as needed (diarrhea and abdominal pain) 90 capsule 0   • ergocalciferol (VITAMIN D2) 50,000 units TAKE 1 CAPSULE BY MOUTH ONE TIME PER WEEK 4 capsule 2   • rosuvastatin (CRESTOR) 10 MG tablet TAKE 1 TABLET BY MOUTH EVERY DAY 90 tablet 1     No current facility-administered medications for this visit  Allergies   Allergen Reactions   • Iodinated Contrast Media        Review of Systems    Video Exam    There were no vitals filed for this visit      Physical Exam     Visit Time    Visit Start Time: 7:00 AM  Visit Stop Time: 7:48 AM  Total Visit Duration: 48 minutes

## 2023-03-22 ENCOUNTER — TELEMEDICINE (OUTPATIENT)
Dept: BEHAVIORAL/MENTAL HEALTH CLINIC | Facility: CLINIC | Age: 53
End: 2023-03-22

## 2023-03-22 DIAGNOSIS — F41.8 DEPRESSION WITH ANXIETY: Primary | ICD-10-CM

## 2023-03-22 NOTE — PSYCH
Virtual Regular Visit  Therapist met w/pt for individual session  Pt stated she has had some anxiety(worry) about finances but is feeling a lot better emotionally after resigning from her job  She stated she is almost done cleaning her house and then will begin to engage in physical activity  She stated that her stomach issues have improved and she has been able to keep food down  She stated she has oswald sleeping better and continues to have more hope for the future  Verification of patient location:    Patient is located in the following state in which I hold an [place] license [place]      Assessment/Plan: f/u in one week    Problem List Items Addressed This Visit        Other    Depression with anxiety - Primary            Reason for visit is No chief complaint on file  Encounter provider Jaymie Joe    Provider located at [facility] [address]  [address]  [facility] [address] [place] [address] [place]      Recent Visits  Date Type Provider Dept   03/15/23 [place] recent visits within past 7 days and meeting all other requirements  Today's Visits  Date Type Provider Dept   03/22/23 Telemedicine [address] today's visits and meeting all other requirements  Future Appointments  No visits were found meeting these conditions  Showing future appointments within next 150 days and meeting all other requirements       The patient was identified by name and date of birth  Jesse Luke was informed that this is a telemedicine visit and that the visit is being conducted throughthe [employer] platform  She agrees to proceed     My office door was closed  No one else was in the room  She acknowledged consent and understanding of privacy and security of the video platform   The patient has agreed to participate and understands they can discontinue the visit at any time  Patient is aware this is a billable service  Subjective  Dahlia Mayorga is a 50 y o  female    HPI     Past Medical History:   Diagnosis Date   • Cancer [facility])    • Colon cancer ([facility] [address] ) 2014   • History of chemotherapy 2014    colon ca   • History of radiation therapy 2014    colon ca   • History of rectal cancer 2014    Locally advanced    • History of tear of meniscus of knee joint    • Hyperlipidemia    • Tennis elbow        Past Surgical History:   Procedure Laterality Date   • AUGMENTATION MAMMAPLASTY Bilateral 2005   • BREAST SURGERY Bilateral     RECONSTRUCION WITH IMPLANT PROTHESIS    • COLON SURGERY     • COLONOSCOPY  04/2021   • ELBOW SURGERY Left 2015    Tennis elbow   • KNEE SURGERY     • MENISCECTOMY Left 06/2016       Current Outpatient Medications   Medication Sig Dispense Refill   • ammonium lactate (LAC-HYDRIN) 12 % cream Apply topically as needed for dry skin 385 g 3   • buPROPion (WELLBUTRIN SR) 150 mg 12 hr tablet TAKE 1 TABLET BY MOUTH TWICE A  tablet 1   • dicyclomine (BENTYL) 10 mg capsule Take 1 capsule (10 mg total) by mouth 3 (three) times a day as needed (diarrhea and abdominal pain) 90 capsule 0   • ergocalciferol (VITAMIN D2) 50,000 units TAKE 1 CAPSULE BY MOUTH ONE TIME PER WEEK 4 capsule 2   • rosuvastatin (CRESTOR) 10 MG tablet TAKE 1 TABLET BY MOUTH EVERY DAY 90 tablet 1     No current facility-administered medications for this visit  Allergies   Allergen Reactions   • Iodinated Contrast Media        Review of Systems    Video Exam    There were no vitals filed for this visit      Physical Exam     Visit Time    Visit Start Time: 7:01 AM  Visit Stop Time: 7:44 AM  Total Visit Duration: 43 minutes

## 2023-03-24 DIAGNOSIS — E78.5 HYPERLIPIDEMIA, UNSPECIFIED HYPERLIPIDEMIA TYPE: ICD-10-CM

## 2023-03-24 RX ORDER — ROSUVASTATIN CALCIUM 10 MG/1
TABLET, COATED ORAL
Qty: 30 TABLET | Refills: 5 | Status: SHIPPED | OUTPATIENT
Start: 2023-03-24

## 2023-03-29 ENCOUNTER — TELEMEDICINE (OUTPATIENT)
Dept: BEHAVIORAL/MENTAL HEALTH CLINIC | Facility: CLINIC | Age: 53
End: 2023-03-29

## 2023-03-29 DIAGNOSIS — F41.9 ANXIETY: Primary | ICD-10-CM

## 2023-03-29 DIAGNOSIS — F41.8 DEPRESSION WITH ANXIETY: ICD-10-CM

## 2023-03-29 NOTE — PSYCH
Virtual Regular Visit  Therapist met w/pt for individual session  She stated that she continues to be grateful that she isn't working but has realized she needs to return back in a month or two  She stated that she is starting to get bored and is the type of person that needs to be moving  Pt stated that she does feel calmer and hasn't had any crying spells  She shared that if a negative thought does come up she is able to challenge it immediately where before she would ruminate a lot longer  Pt stated she is starting to be happier and not dread waking up  Discussion was held around ways to maintain this stability  Verification of patient location:    Patient is located in the following state in which I hold an [place] license [place]      Assessment/Plan: f/u in two weeks    Problem List Items Addressed This Visit        Other    Anxiety - Primary    Depression with anxiety              Reason for visit is No chief complaint on file  Encounter provider Gemma Peguero    Provider located at [facility] [address]  [address]  [facility] [address] [place] [address] [place]      Recent Visits  Date Type Provider Dept   03/22/23 [place] recent visits within past 7 days and meeting all other requirements  Today's Visits  Date Type Provider Dept   03/29/23 Telemedicine [address] today's visits and meeting all other requirements  Future Appointments  No visits were found meeting these conditions  Showing future appointments within next 150 days and meeting all other requirements       The patient was identified by name and date of birth  Lesa Ding was informed that this is a telemedicine visit and that the visit is being conducted throughthe [email or web address] platform  She agrees to proceed     My office door was closed  No one else was in the room  She acknowledged consent and understanding of privacy and security of the video platform  The patient has agreed to participate and understands they can discontinue the visit at any time  Patient is aware this is a billable service  Subjective  Facundo Gomez is a 50 y o  female    HPI     Past Medical History:   Diagnosis Date   • Cancer [facility])    • Colon cancer ([address] ) 2014   • History of chemotherapy 2014    colon ca   • History of radiation therapy 2014    colon ca   • History of rectal cancer 2014    Locally advanced    • History of tear of meniscus of knee joint    • Hyperlipidemia    • Tennis elbow        Past Surgical History:   Procedure Laterality Date   • AUGMENTATION MAMMAPLASTY Bilateral 2005   • BREAST SURGERY Bilateral     RECONSTRUCION WITH IMPLANT PROTHESIS    • COLON SURGERY     • COLONOSCOPY  04/2021   • ELBOW SURGERY Left 2015    Tennis elbow   • KNEE SURGERY     • MENISCECTOMY Left 06/2016       Current Outpatient Medications   Medication Sig Dispense Refill   • ammonium lactate (LAC-HYDRIN) 12 % cream Apply topically as needed for dry skin 385 g 3   • buPROPion (WELLBUTRIN SR) 150 mg 12 hr tablet TAKE 1 TABLET BY MOUTH TWICE A  tablet 1   • dicyclomine (BENTYL) 10 mg capsule Take 1 capsule (10 mg total) by mouth 3 (three) times a day as needed (diarrhea and abdominal pain) 90 capsule 0   • ergocalciferol (VITAMIN D2) 50,000 units TAKE 1 CAPSULE BY MOUTH ONE TIME PER WEEK 4 capsule 2   • rosuvastatin (CRESTOR) 10 MG tablet TAKE 1 TABLET BY MOUTH EVERY DAY 30 tablet 5     No current facility-administered medications for this visit  Allergies   Allergen Reactions   • Iodinated Contrast Media        Review of Systems    Video Exam    There were no vitals filed for this visit      Physical Exam     Visit Time    Visit Start Time:  7:00 AM  Visit Stop Time: 7:47 AM  Total Visit Duration: 47 minutes

## 2023-04-29 DIAGNOSIS — E55.9 VITAMIN D DEFICIENCY: ICD-10-CM

## 2023-04-29 RX ORDER — ERGOCALCIFEROL 1.25 MG/1
CAPSULE ORAL
Qty: 4 CAPSULE | Refills: 2 | Status: SHIPPED | OUTPATIENT
Start: 2023-04-29

## 2023-05-05 ENCOUNTER — TELEMEDICINE (OUTPATIENT)
Dept: BEHAVIORAL/MENTAL HEALTH CLINIC | Facility: CLINIC | Age: 53
End: 2023-05-05

## 2023-05-05 DIAGNOSIS — F41.8 DEPRESSION WITH ANXIETY: ICD-10-CM

## 2023-05-05 DIAGNOSIS — F41.9 ANXIETY: Primary | ICD-10-CM

## 2023-05-08 ENCOUNTER — HOSPITAL ENCOUNTER (OUTPATIENT)
Dept: ULTRASOUND IMAGING | Facility: CLINIC | Age: 53
Discharge: HOME | End: 2023-05-08

## 2023-05-08 DIAGNOSIS — Z80.3 FAMILY HISTORY OF BREAST CANCER IN FIRST DEGREE RELATIVE: ICD-10-CM

## 2023-05-08 NOTE — PSYCH
"    Visit start and stop times:    05/05/23  Start Time: 0700  Stop Time: 0748  Total Visit Time: 48 minutes     Virtual Regular Visit  Therapist met w/pt for individual session  Pt stated that she had a great vacation and is glad she went  She identified getting emotional several days that she was there but also realizing that that is what she needed  She stated that there were two times where she had an \"anger outburst \"  Therapist and pt discussed what led up to the outbursts and ways she could have handled it differently  Pt stated that she is working on her resume and hopes to get employment over the next few months but wants to continue to take this time to focus on herself and her overall mental health  Verification of patient location:    Patient is located at Home in the following state in which I hold an active license [place]      Assessment/Plan: f/u in one week    Problem List Items Addressed This Visit        Other    Anxiety - Primary              Reason for visit is No chief complaint on file  Encounter provider Vivian Burk    Provider located at [facility] [address]  [address]  [facility] [address] [place]      Recent Visits  Date Type Provider Dept   05/05/23 Telemedicine [address] recent visits within past 7 days and meeting all other requirements  Future Appointments  No visits were found meeting these conditions  Showing future appointments within next 150 days and meeting all other requirements       The patient was identified by name and date of birth  Efren Franco was informed that this is a telemedicine visit and that the visit is being conducted throughthe [employer] platform  She agrees to proceed     My office door was closed  No one else was in the room    She acknowledged consent and understanding of privacy and " security of the video platform  The patient has agreed to participate and understands they can discontinue the visit at any time  Patient is aware this is a billable service  Subjective  Anita Stock is a 50 y o  female    HPI     Past Medical History:   Diagnosis Date   • Cancer [facility])    • Colon cancer ([address] ) 2014   • History of chemotherapy 2014    colon ca   • History of radiation therapy 2014    colon ca   • History of rectal cancer 2014    Locally advanced    • History of tear of meniscus of knee joint    • Hyperlipidemia    • Tennis elbow        Past Surgical History:   Procedure Laterality Date   • AUGMENTATION MAMMAPLASTY Bilateral 2005   • BREAST SURGERY Bilateral     RECONSTRUCION WITH IMPLANT PROTHESIS    • COLON SURGERY     • COLONOSCOPY  04/2021   • ELBOW SURGERY Left 2015    Tennis elbow   • KNEE SURGERY     • MENISCECTOMY Left 06/2016       Current Outpatient Medications   Medication Sig Dispense Refill   • ammonium lactate (LAC-HYDRIN) 12 % cream Apply topically as needed for dry skin 385 g 3   • buPROPion (WELLBUTRIN SR) 150 mg 12 hr tablet TAKE 1 TABLET BY MOUTH TWICE A  tablet 1   • dicyclomine (BENTYL) 10 mg capsule Take 1 capsule (10 mg total) by mouth 3 (three) times a day as needed (diarrhea and abdominal pain) 90 capsule 0   • ergocalciferol (VITAMIN D2) 50,000 units TAKE 1 CAPSULE BY MOUTH ONE TIME PER WEEK 4 capsule 2   • rosuvastatin (CRESTOR) 10 MG tablet TAKE 1 TABLET BY MOUTH EVERY DAY 30 tablet 5     No current facility-administered medications for this visit  Allergies   Allergen Reactions   • Iodinated Contrast Media        Review of Systems    Video Exam    There were no vitals filed for this visit      Physical Exam pt denied any SI/HI/AH/VH

## 2023-05-10 ENCOUNTER — TELEMEDICINE (OUTPATIENT)
Dept: BEHAVIORAL/MENTAL HEALTH CLINIC | Facility: CLINIC | Age: 53
End: 2023-05-10

## 2023-05-10 DIAGNOSIS — F41.8 DEPRESSION WITH ANXIETY: Primary | ICD-10-CM

## 2023-05-11 NOTE — PSYCH
Visit start and stop times:    05/10/23  Start Time: 0700  Stop Time: 0746  Total Visit Time: 46 minutes  Virtual Regular Visit  Therapist met w/pt for individual session  Pt stated that she hasn't felt anxious or overwhelmed  She stated that she hasn't had any anger outbursts  She stated she did see her mother this weekend and something she said triggered her but pt realized how she felt an was able to use calming strategies  Therapist and pt discussed progress she has made and goals to continue to work on  Pt stated she has seen growth with how she reacts and the importance of engaging in self care  Verification of patient location:    Patient is located at Home in the following state in which I hold an active license [place]      Assessment/Plan: f/u in one week    Problem List Items Addressed This Visit        Other    Depression with anxiety - Primary            Reason for visit is No chief complaint on file  Encounter provider Laura Dumont    Provider located at [facility] [address]  [address]  [facility] [address] [place]      Recent Visits  Date Type Provider Dept   05/10/23 [address] [place] [address]   05/05/23 [address] [place] [address] 1581  [facility]   Showing recent visits within past 7 days and meeting all other requirements  Future Appointments  No visits were found meeting these conditions  Showing future appointments within next 150 days and meeting all other requirements       The patient was identified by name and date of birth  Abeba Serrato was informed that this is a telemedicine visit and that the visit is being conducted throughthe "[employer] platform  She agrees to proceed     My office door was closed  No one else was in the room    She acknowledged consent and understanding of privacy and security of the video platform  The patient has agreed to participate and understands they can discontinue the visit at any time  Patient is aware this is a billable service  Subjective  Shreyas Sanders is a 50 y o  female    HPI     Past Medical History:   Diagnosis Date   • Cancer [facility])    • Colon cancer ([facility] [address] ) 2014   • History of chemotherapy 2014    colon ca   • History of radiation therapy 2014    colon ca   • History of rectal cancer 2014    Locally advanced    • History of tear of meniscus of knee joint    • Hyperlipidemia    • Tennis elbow        Past Surgical History:   Procedure Laterality Date   • AUGMENTATION MAMMAPLASTY Bilateral 2005   • BREAST SURGERY Bilateral     RECONSTRUCION WITH IMPLANT PROTHESIS    • COLON SURGERY     • COLONOSCOPY  04/2021   • ELBOW SURGERY Left 2015    Tennis elbow   • KNEE SURGERY     • MENISCECTOMY Left 06/2016       Current Outpatient Medications   Medication Sig Dispense Refill   • ammonium lactate (LAC-HYDRIN) 12 % cream Apply topically as needed for dry skin 385 g 3   • buPROPion (WELLBUTRIN SR) 150 mg 12 hr tablet TAKE 1 TABLET BY MOUTH TWICE A  tablet 1   • dicyclomine (BENTYL) 10 mg capsule Take 1 capsule (10 mg total) by mouth 3 (three) times a day as needed (diarrhea and abdominal pain) 90 capsule 0   • ergocalciferol (VITAMIN D2) 50,000 units TAKE 1 CAPSULE BY MOUTH ONE TIME PER WEEK 4 capsule 2   • rosuvastatin (CRESTOR) 10 MG tablet TAKE 1 TABLET BY MOUTH EVERY DAY 30 tablet 5     No current facility-administered medications for this visit  Allergies   Allergen Reactions   • Iodinated Contrast Media        Review of Systems    Video Exam    There were no vitals filed for this visit      Physical Exam  Pt denied any SI/HI/AH/VH

## 2023-05-17 ENCOUNTER — TELEMEDICINE (OUTPATIENT)
Dept: BEHAVIORAL/MENTAL HEALTH CLINIC | Facility: CLINIC | Age: 53
End: 2023-05-17

## 2023-05-17 DIAGNOSIS — F41.8 DEPRESSION WITH ANXIETY: Primary | ICD-10-CM

## 2023-05-17 NOTE — PSYCH
Visit start and stop times:    05/17/23  Start Time: 0700  Stop Time: 4043  Total Visit Time: 47 minutes     Virtual Regular Visit  Therapist met w/pt for individual session  Pt stated that she has had difficulty sleeping but not because of her anxiety  Pt stated that overall she feels her stress/anxiety have been well managed  She reported staying busy and working on setting boundaries w/her loved ones  Pt stated that she hasn't had any crying spells  She is going to spend a few day at her son's house which she is looking forward to this week  Verification of patient location:    Patient is located at Home in the following state in which I hold an active license [place]      Assessment/Plan: f/u in one week    Problem List Items Addressed This Visit    None         Reason for visit is No chief complaint on file  Encounter provider Shanon Kruger    Provider located at [facility] [address]  [address]  [facility] [address] [place]      Recent Visits  Date Type Provider Dept   05/10/23 Telemedicine [address] recent visits within past 7 days and meeting all other requirements  Today's Visits  Date Type Provider Dept   05/17/23 Telemedicine [address] today's visits and meeting all other requirements  Future Appointments  No visits were found meeting these conditions  Showing future appointments within next 150 days and meeting all other requirements       The patient was identified by name and date of birth  Nick Hua was informed that this is a telemedicine visit and that the visit is being conducted throughthe [employer] platform  She agrees to proceed     My office door was closed  No one else was in the room    She acknowledged consent and understanding of privacy and security of the video platform  The patient has agreed to participate and understands they can discontinue the visit at any time  Patient is aware this is a billable service  Subjective  Efren Franco is a 50 y o  female    HPI     Past Medical History:   Diagnosis Date   • Cancer [facility])    • Colon cancer ([facility] [address] ) 2014   • History of chemotherapy 2014    colon ca   • History of radiation therapy 2014    colon ca   • History of rectal cancer 2014    Locally advanced    • History of tear of meniscus of knee joint    • Hyperlipidemia    • Tennis elbow        Past Surgical History:   Procedure Laterality Date   • AUGMENTATION MAMMAPLASTY Bilateral 2005   • BREAST SURGERY Bilateral     RECONSTRUCION WITH IMPLANT PROTHESIS    • COLON SURGERY     • COLONOSCOPY  04/2021   • ELBOW SURGERY Left 2015    Tennis elbow   • KNEE SURGERY     • MENISCECTOMY Left 06/2016       Current Outpatient Medications   Medication Sig Dispense Refill   • ammonium lactate (LAC-HYDRIN) 12 % cream Apply topically as needed for dry skin 385 g 3   • buPROPion (WELLBUTRIN SR) 150 mg 12 hr tablet TAKE 1 TABLET BY MOUTH TWICE A  tablet 1   • dicyclomine (BENTYL) 10 mg capsule Take 1 capsule (10 mg total) by mouth 3 (three) times a day as needed (diarrhea and abdominal pain) 90 capsule 0   • ergocalciferol (VITAMIN D2) 50,000 units TAKE 1 CAPSULE BY MOUTH ONE TIME PER WEEK 4 capsule 2   • rosuvastatin (CRESTOR) 10 MG tablet TAKE 1 TABLET BY MOUTH EVERY DAY 30 tablet 5     No current facility-administered medications for this visit  Allergies   Allergen Reactions   • Iodinated Contrast Media        Review of Systems    Video Exam    There were no vitals filed for this visit      Physical Exam Pt denied any SI/HI/Ah/VH

## 2023-05-24 ENCOUNTER — TELEMEDICINE (OUTPATIENT)
Dept: BEHAVIORAL/MENTAL HEALTH CLINIC | Facility: CLINIC | Age: 53
End: 2023-05-24

## 2023-05-24 DIAGNOSIS — F41.8 DEPRESSION WITH ANXIETY: Primary | ICD-10-CM

## 2023-06-23 ENCOUNTER — TELEMEDICINE (OUTPATIENT)
Dept: BEHAVIORAL/MENTAL HEALTH CLINIC | Facility: CLINIC | Age: 53
End: 2023-06-23
Payer: COMMERCIAL

## 2023-06-23 DIAGNOSIS — F41.8 DEPRESSION WITH ANXIETY: Primary | ICD-10-CM

## 2023-06-23 PROCEDURE — 90834 PSYTX W PT 45 MINUTES: CPT | Performed by: SOCIAL WORKER

## 2023-06-23 NOTE — PSYCH
Visit start and stop times:    06/21/23  Start Time: 0717  Stop Time: 0805  Total Visit Time: 48 minutes  Virtual Regular Visit  Therapist met w/pt for individual session  Symptoms include: irritability, anxiety, worry, sadness  Pt stated that the last month has been difficult but her mother is back in the nursing home  Pt identified various times where she was triggered but overall was able to manage her triggers  Pt stated that she has not worked on her resume and at this point knows that it is fear related  Therapist and pt discussed ways to work through her fear in a healthy way so she can start working on her resume  Verification of patient location:    Patient is located at Home in the following state in which I hold an active license [place]      Assessment/Plan: f/u in two weeks    Problem List Items Addressed This Visit        Other    Depression with anxiety - Primary              Reason for visit is No chief complaint on file  Encounter provider Kar Mckenzie    Provider located at [facility] [address]  [address]  [facility] [address] [place]      Recent Visits  No visits were found meeting these conditions  Showing recent visits within past 7 days and meeting all other requirements  Today's Visits  Date Type Provider Dept   06/23/23 Telemedicine [address] today's visits and meeting all other requirements  Future Appointments  No visits were found meeting these conditions  Showing future appointments within next 150 days and meeting all other requirements       The patient was identified by name and date of birth  Ozzy Min was informed that this is a telemedicine visit and that the visit is being conducted throughthe [employer] platform  She agrees to proceed     My office door was closed  No one else was in the room    She acknowledged consent and understanding of privacy and security of the video platform  The patient has agreed to participate and understands they can discontinue the visit at any time  Patient is aware this is a billable service  Subjective  Mercy Bell is a 50 y o  female    HPI     Past Medical History:   Diagnosis Date   • Cancer [facility])    • Colon cancer ([address] ) 2014   • History of chemotherapy 2014    colon ca   • History of radiation therapy 2014    colon ca   • History of rectal cancer 2014    Locally advanced    • History of tear of meniscus of knee joint    • Hyperlipidemia    • Tennis elbow        Past Surgical History:   Procedure Laterality Date   • AUGMENTATION MAMMAPLASTY Bilateral 2005   • BREAST SURGERY Bilateral     RECONSTRUCION WITH IMPLANT PROTHESIS    • COLON SURGERY     • COLONOSCOPY  04/2021   • ELBOW SURGERY Left 2015    Tennis elbow   • KNEE SURGERY     • MENISCECTOMY Left 06/2016       Current Outpatient Medications   Medication Sig Dispense Refill   • ammonium lactate (LAC-HYDRIN) 12 % cream Apply topically as needed for dry skin 385 g 3   • buPROPion (WELLBUTRIN SR) 150 mg 12 hr tablet TAKE 1 TABLET BY MOUTH TWICE A  tablet 1   • dicyclomine (BENTYL) 10 mg capsule Take 1 capsule (10 mg total) by mouth 3 (three) times a day as needed (diarrhea and abdominal pain) 90 capsule 0   • ergocalciferol (VITAMIN D2) 50,000 units TAKE 1 CAPSULE BY MOUTH ONE TIME PER WEEK 4 capsule 2   • rosuvastatin (CRESTOR) 10 MG tablet TAKE 1 TABLET BY MOUTH EVERY DAY 30 tablet 5     No current facility-administered medications for this visit  Allergies   Allergen Reactions   • Iodinated Contrast Media        Review of Systems    Video Exam    There were no vitals filed for this visit      Physical Exam Pt denied any SI/HI/Ah/VH

## 2023-06-29 DIAGNOSIS — E78.5 HYPERLIPIDEMIA, UNSPECIFIED HYPERLIPIDEMIA TYPE: ICD-10-CM

## 2023-06-29 RX ORDER — ROSUVASTATIN CALCIUM 10 MG/1
TABLET, COATED ORAL
Qty: 90 TABLET | Refills: 2 | Status: SHIPPED | OUTPATIENT
Start: 2023-06-29

## 2023-07-06 ENCOUNTER — TELEMEDICINE (OUTPATIENT)
Dept: BEHAVIORAL/MENTAL HEALTH CLINIC | Facility: CLINIC | Age: 53
End: 2023-07-06
Payer: COMMERCIAL

## 2023-07-06 DIAGNOSIS — F41.8 DEPRESSION WITH ANXIETY: Primary | ICD-10-CM

## 2023-07-06 PROCEDURE — 90834 PSYTX W PT 45 MINUTES: CPT | Performed by: SOCIAL WORKER

## 2023-07-17 NOTE — PSYCH
Visit start and stop times:    07/06/23  Start Time: 0700  Stop Time: 0745  Total Visit Time: 45 minutes  Virtual Regular Visit  Therapist met w/pt for individual session. Pt stated that she is having increased anxiety symptoms due to thinking about going back to work. She stated that there is a fear of failing in terms of starting a new job and not succeeding. Discussion was held around ways to challenge her negative thoughts and use this as an opportunity to grow. Pt stated that she plans on starting her workouts again to help improve her overall mindset. Verification of patient location:    Patient is located at Home in the following state in which I hold an [place] license [place]      Assessment/Plan: f/u in 2 weeks    Problem List Items Addressed This Visit        Other    Depression with anxiety - Primary                Reason for visit is No chief complaint on file. Encounter provider Soraya Cobian    Provider located at [address] [facility]  [address]      Recent Visits  No visits were found meeting these conditions. Showing recent visits within past 7 days and meeting all other requirements  Future Appointments  No visits were found meeting these conditions. Showing future appointments within next 150 days and meeting all other requirements       The patient was identified by name and date of birth. Julita Miller was informed that this is a telemedicine visit and that the visit is being conducted throughthe [place] platform. She agrees to proceed. .  My office door was closed. No one else was in the room. She acknowledged consent and understanding of privacy and security of the video platform. The patient has agreed to participate and understands they can discontinue the visit at any time. Patient is aware this is a billable service.      [facility] Han Khan is a 50 y.o. female  . HPI     Past Medical History:   Diagnosis Date   • Cancer [facility])    • Colon cancer ([address]) 2014   • History of chemotherapy 2014    colon ca   • History of radiation therapy 2014    colon ca   • History of rectal cancer 2014    Locally advanced    • History of tear of meniscus of knee joint    • Hyperlipidemia    • Tennis elbow        Past Surgical History:   Procedure Laterality Date   • AUGMENTATION MAMMAPLASTY Bilateral 2005   • BREAST SURGERY Bilateral     RECONSTRUCION WITH IMPLANT PROTHESIS    • COLON SURGERY     • COLONOSCOPY  04/2021   • ELBOW SURGERY Left 2015    Tennis elbow   • KNEE SURGERY     • MENISCECTOMY Left 06/2016       Current Outpatient Medications   Medication Sig Dispense Refill   • ammonium lactate (LAC-HYDRIN) 12 % cream Apply topically as needed for dry skin 385 g 3   • buPROPion (WELLBUTRIN SR) 150 mg 12 hr tablet TAKE 1 TABLET BY MOUTH TWICE A  tablet 1   • dicyclomine (BENTYL) 10 mg capsule Take 1 capsule (10 mg total) by mouth 3 (three) times a day as needed (diarrhea and abdominal pain) 90 capsule 0   • ergocalciferol (VITAMIN D2) 50,000 units TAKE 1 CAPSULE BY MOUTH ONE TIME PER WEEK 4 capsule 2   • rosuvastatin (CRESTOR) 10 MG tablet TAKE 1 TABLET BY MOUTH EVERY DAY 90 tablet 2     No current facility-administered medications for this visit. Allergies   Allergen Reactions   • Iodinated Contrast Media        Review of Systems    Video Exam    There were no vitals filed for this visit.     Physical Exam Pt denied any SI/Hi/AH/VH

## 2023-07-20 ENCOUNTER — TELEMEDICINE (OUTPATIENT)
Dept: BEHAVIORAL/MENTAL HEALTH CLINIC | Facility: CLINIC | Age: 53
End: 2023-07-20
Payer: COMMERCIAL

## 2023-07-20 DIAGNOSIS — F41.8 DEPRESSION WITH ANXIETY: Primary | ICD-10-CM

## 2023-07-20 PROCEDURE — 90834 PSYTX W PT 45 MINUTES: CPT | Performed by: SOCIAL WORKER

## 2023-07-24 NOTE — PSYCH
Visit start and stop times:    07/20/23  Start Time: 1030  Stop Time: 1115  Total Visit Time: 45 minutes  Virtual Regular Visit  Therapist met w/pt for individual session. Pt stated that she has been struggling w/more anxiety/depressive symptoms including: irritability, restlessness, lonely, racing thoughts, worry. Pt stated that she hasn't worked on her resume and identified some barriers w/doing so but realizes that that is something she needs to do because she needs to get back to work. Therapist and pt discussed ongoing strategies to help manage the racing thoughts. Verification of patient location:    Patient is located at Home [place] the following state in [place] an [place]      Assessment/Plan: f/u in two weeks    Problem List Items Addressed This Visit        Other    Depression with anxiety - Primary              Reason for visit is No chief complaint on file. Encounter provider Stevo Lubin    Provider located at [address]      Recent Visits  Date Type Provider Dept   07/20/23 Telemedicine [address] [place] [address] recent visits within past 7 days and meeting all other requirements  Future Appointments  No visits were found meeting these conditions. Showing future appointments within next 150 days and meeting all other requirements       The patient was identified by name and date of birth. Isabel Quezada was informed that this is a telemedicine visit and that the visit is being conducted [place]. She agrees to proceed. .  My office door was closed. No one else was in the room. She acknowledged consent and understanding of privacy and security of the video platform.  The patient has agreed to participate and understands they can discontinue the visit at any time.    Patient is aware this is a billable service. Raul Pearson is a 50 y.o. female  . HPI     Past Medical History:   Diagnosis Date   • Cancer [facility])    • Colon cancer ([address]) 2014   • History of chemotherapy 2014    colon ca   • History of radiation therapy 2014    colon ca   • History of rectal cancer 2014    Locally advanced    • History of tear of meniscus of knee joint    • Hyperlipidemia    • Tennis elbow        Past Surgical History:   Procedure Laterality Date   • AUGMENTATION MAMMAPLASTY Bilateral 2005   • BREAST SURGERY Bilateral     RECONSTRUCION WITH IMPLANT PROTHESIS    • COLON SURGERY     • COLONOSCOPY  04/2021   • ELBOW SURGERY Left 2015    Tennis elbow   • KNEE SURGERY     • MENISCECTOMY Left 06/2016       Current Outpatient Medications   Medication Sig Dispense Refill   • ammonium lactate (LAC-HYDRIN) 12 % cream Apply topically as needed for dry skin 385 g 3   • buPROPion (WELLBUTRIN SR) 150 mg 12 hr tablet TAKE 1 TABLET BY MOUTH TWICE A  tablet 1   • dicyclomine (BENTYL) 10 mg capsule Take 1 capsule (10 mg total) by mouth 3 (three) times a day as needed (diarrhea and abdominal pain) 90 capsule 0   • ergocalciferol (VITAMIN D2) 50,000 units TAKE 1 CAPSULE BY MOUTH ONE TIME PER WEEK 4 capsule 2   • rosuvastatin (CRESTOR) 10 MG tablet TAKE 1 TABLET BY MOUTH EVERY DAY 90 tablet 2     No current facility-administered medications for this visit. Allergies   Allergen Reactions   • Iodinated Contrast Media        Review of Systems    Video Exam    There were no vitals filed for this visit.     Physical Exam Pt denied any SI/HI/Ah/VH

## 2023-07-31 ENCOUNTER — TELEMEDICINE (OUTPATIENT)
Dept: BEHAVIORAL/MENTAL HEALTH CLINIC | Facility: CLINIC | Age: 53
End: 2023-07-31
Payer: COMMERCIAL

## 2023-07-31 DIAGNOSIS — F41.8 DEPRESSION WITH ANXIETY: Primary | ICD-10-CM

## 2023-07-31 PROCEDURE — 90834 PSYTX W PT 45 MINUTES: CPT | Performed by: SOCIAL WORKER

## 2023-07-31 NOTE — PSYCH
Visit start and stop times:    07/31/23  Start Time: 1017  Stop Time: 1058  Total Visit Time: 41 minutes  Virtual Regular Visit  Therapist met w/pt for individual session. Pt stated she is feeling better emotionally. She stated that son and his family stayed w/her for a few days and she is feeling less depressed/hopeless. She stated that she feels more connected to him. She also stated that although she isn't ready to return back to work she has looked at jobs and has oswald working on her resume. Therapist and pt discussed how proud she is of herself. She continues to engage in physical activity and engage in self care. She stated that she feels less anxious, irritable and depressed. Verification of patient location:    Patient is located at Home [place] the following state in which [place] an [place] license [place]      Assessment/Plan: f/u in two weeks  Problem List Items Addressed This Visit        Other    Depression with anxiety - Primary              Reason for visit is No chief complaint on file. Encounter provider Brianna Maritns    Provider located at [facility] [address] [facility] [address] [place] [address]  [address] [place] [address]      Recent Visits  No visits were found meeting these conditions. Showing recent visits within past 7 days and meeting all other requirements  Today's Visits  Date Type Provider Dept   07/31/23 Telemedicine [facility] today's visits and meeting all other requirements  Future Appointments  No visits were found meeting these conditions. Showing future appointments within next 150 days and meeting all other requirements       The patient was identified by name and date of birth. Jearline Duane was informed that this is a telemedicine visit and that [facility] [employer]. She agrees to proceed. .  My office door was closed. No one else was in the room. She acknowledged consent and understanding of privacy and security of the video platform. The patient has agreed to participate and understands they can discontinue the visit at any time. Patient is aware this is a billable service. Raul Booker is a 50 y.o. female  . HPI     Past Medical History:   Diagnosis Date   • Cancer [facility])    • Colon cancer ([address]) 2014   • History of chemotherapy 2014    colon ca   • History of radiation therapy 2014    colon ca   • History of rectal cancer 2014    Locally advanced    • History of tear of meniscus of knee joint    • Hyperlipidemia    • Tennis elbow        Past Surgical History:   Procedure Laterality Date   • AUGMENTATION MAMMAPLASTY Bilateral 2005   • BREAST SURGERY Bilateral     RECONSTRUCION WITH IMPLANT PROTHESIS    • COLON SURGERY     • COLONOSCOPY  04/2021   • ELBOW SURGERY Left 2015    Tennis elbow   • KNEE SURGERY     • MENISCECTOMY Left 06/2016       Current Outpatient Medications   Medication Sig Dispense Refill   • ammonium lactate (LAC-HYDRIN) 12 % cream Apply topically as needed for dry skin 385 g 3   • buPROPion (WELLBUTRIN SR) 150 mg 12 hr tablet TAKE 1 TABLET BY MOUTH TWICE A  tablet 1   • dicyclomine (BENTYL) 10 mg capsule Take 1 capsule (10 mg total) by mouth 3 (three) times a day as needed (diarrhea and abdominal pain) 90 capsule 0   • ergocalciferol (VITAMIN D2) 50,000 units TAKE 1 CAPSULE BY MOUTH ONE TIME PER WEEK 4 capsule 2   • rosuvastatin (CRESTOR) 10 MG tablet TAKE 1 TABLET BY MOUTH EVERY DAY 90 tablet 2     No current facility-administered medications for this visit. Allergies   Allergen Reactions   • Iodinated Contrast Media        Review of Systems    Video Exam    There were no vitals filed for this visit.     Physical Exam  Pt denied any SI/HI/AH/VH

## 2023-08-14 ENCOUNTER — TELEMEDICINE (OUTPATIENT)
Dept: BEHAVIORAL/MENTAL HEALTH CLINIC | Facility: CLINIC | Age: 53
End: 2023-08-14
Payer: COMMERCIAL

## 2023-08-14 DIAGNOSIS — F41.8 DEPRESSION WITH ANXIETY: ICD-10-CM

## 2023-08-14 DIAGNOSIS — F41.9 ANXIETY: Primary | ICD-10-CM

## 2023-08-14 PROCEDURE — 90834 PSYTX W PT 45 MINUTES: CPT | Performed by: SOCIAL WORKER

## 2023-08-14 NOTE — PSYCH
Visit start and stop times:    08/14/23  Start Time: 1113  Stop Time: 1156  Total Visit Time: 43 minutes  Virtual Regular Visit  Therapist met w/pt for individual session. Pt stated that she has had a busy few weeks with family and friends and overall feels like she is in a good space. She stated that she continues to work on her resume and has been looking at jobs. She stated she doesn't feel as fearful and feels more confident once she does decide to put herself out there again. She stated her new grandchild will be arriving within the next few weeks and then in early September she will try to get back into the workforce. Pt stated she hasn't had any crying spells and also hasn't had any anger outbursts. Verification of patient location:    Patient is located at Home in the following state in which I hold an active license [place]      Assessment/Plan: f/u in two weeks    Problem List Items Addressed This Visit        Other    Anxiety - Primary            Reason for visit is No chief complaint on file. Encounter provider Francisco Magana    Provider located at [address] [place] [address]      Recent Visits  No visits were found meeting these conditions. Showing recent visits within past 7 days and meeting all other requirements  Today's Visits  Date Type Provider Dept   08/14/23 Telemedicine [facility] today's visits and meeting all other requirements  Future Appointments  No visits were found meeting these conditions. Showing future appointments within next 150 days and meeting all other requirements       The patient was identified by name and date of birth. Kami Childers was informed that this is a telemedicine visit and that [facility].  She agrees to proceed. .  My office door was closed. No one else was in the room. She acknowledged consent and understanding of privacy and security of the video platform. The patient has agreed to participate and understands they can discontinue the visit at any time. Patient is aware this is a billable service. Subjective  Sonido Snyder is a 50 y.o. female  . HPI     Past Medical History:   Diagnosis Date   • Cancer [facility])    • Colon cancer ([address]) 2014   • History of chemotherapy 2014    colon ca   • History of radiation therapy 2014    colon ca   • History of rectal cancer 2014    Locally advanced    • History of tear of meniscus of knee joint    • Hyperlipidemia    • Tennis elbow        Past Surgical History:   Procedure Laterality Date   • AUGMENTATION MAMMAPLASTY Bilateral 2005   • BREAST SURGERY Bilateral     RECONSTRUCION WITH IMPLANT PROTHESIS    • COLON SURGERY     • COLONOSCOPY  04/2021   • ELBOW SURGERY Left 2015    Tennis elbow   • KNEE SURGERY     • MENISCECTOMY Left 06/2016       Current Outpatient Medications   Medication Sig Dispense Refill   • ammonium lactate (LAC-HYDRIN) 12 % cream Apply topically as needed for dry skin 385 g 3   • buPROPion (WELLBUTRIN SR) 150 mg 12 hr tablet TAKE 1 TABLET BY MOUTH TWICE A  tablet 1   • dicyclomine (BENTYL) 10 mg capsule Take 1 capsule (10 mg total) by mouth 3 (three) times a day as needed (diarrhea and abdominal pain) 90 capsule 0   • ergocalciferol (VITAMIN D2) 50,000 units TAKE 1 CAPSULE BY MOUTH ONE TIME PER WEEK 4 capsule 2   • rosuvastatin (CRESTOR) 10 MG tablet TAKE 1 TABLET BY MOUTH EVERY DAY 90 tablet 2     No current facility-administered medications for this visit. Allergies   Allergen Reactions   • Iodinated Contrast Media        Review of Systems    Video Exam    There were no vitals filed for this visit.     Physical Exam Pt denied any SI/HI/Ah/Vh

## 2023-09-14 ENCOUNTER — TELEMEDICINE (OUTPATIENT)
Dept: BEHAVIORAL/MENTAL HEALTH CLINIC | Facility: CLINIC | Age: 53
End: 2023-09-14
Payer: COMMERCIAL

## 2023-09-14 DIAGNOSIS — F41.9 ANXIETY: Primary | ICD-10-CM

## 2023-09-14 PROCEDURE — 90834 PSYTX W PT 45 MINUTES: CPT | Performed by: SOCIAL WORKER

## 2023-09-19 NOTE — PSYCH
Visit start and stop times:    09/14/23  Start Time: 1300  Stop Time: 1345  Total Visit Time: 45 minutes  Virtual Regular Visit  Therapist met w/pt for individual session. Pt stated that her new grandchild was born and she got to spend a few weeks w/him. She expressed how happy and grateful she is but also how hard it was staying at her son's house. Therapist and pt discussed anxiety triggers and how she managed them better but still felt overwhelmed at times. Pt stated she feels a lot better being back at her house. Pt stated she is feeling motivated to get back into the workforce and has completed her resume. Therapist and pt continued to talk about healthy ways for pt to continue to manage her symptoms. Verification of patient location:    Patient is located at Home in the following state in which I hold an active license [place]      Assessment/Plan: f/u in two weeks    Problem List Items Addressed This Visit        Other    Anxiety - Primary          Reason for visit is No chief complaint on file. Encounter provider Marin Heck    Provider located at [address]      Recent Visits  Date Type Provider Dept   09/14/23 Telemedicine [address] [facility] [address] recent visits within past 7 days and meeting all other requirements  Future Appointments  No visits were found meeting these conditions. Showing future appointments within next 150 days and meeting all other requirements       The patient was identified by name and date of birth. Arthur Nielsen was informed that this is a telemedicine visit and [facility]. She agrees to proceed. .  My office door was closed. No one else was in the room.   She acknowledged consent and understanding of privacy and security of the video platform. The patient has agreed to participate and understands they can discontinue the visit at any time. Patient is aware this is a billable service. Raul Dunbar is a 50 y.o. female  . HPI     Past Medical History:   Diagnosis Date   • Cancer [facility])    • Colon cancer ([address]) 2014   • History of chemotherapy 2014    colon ca   • History of radiation therapy 2014    colon ca   • History of rectal cancer 2014    Locally advanced    • History of tear of meniscus of knee joint    • Hyperlipidemia    • Tennis elbow        Past Surgical History:   Procedure Laterality Date   • AUGMENTATION MAMMAPLASTY Bilateral 2005   • BREAST SURGERY Bilateral     RECONSTRUCION WITH IMPLANT PROTHESIS    • COLON SURGERY     • COLONOSCOPY  04/2021   • ELBOW SURGERY Left 2015    Tennis elbow   • KNEE SURGERY     • MENISCECTOMY Left 06/2016       Current Outpatient Medications   Medication Sig Dispense Refill   • ammonium lactate (LAC-HYDRIN) 12 % cream Apply topically as needed for dry skin 385 g 3   • buPROPion (WELLBUTRIN SR) 150 mg 12 hr tablet TAKE 1 TABLET BY MOUTH TWICE A  tablet 1   • dicyclomine (BENTYL) 10 mg capsule Take 1 capsule (10 mg total) by mouth 3 (three) times a day as needed (diarrhea and abdominal pain) 90 capsule 0   • ergocalciferol (VITAMIN D2) 50,000 units TAKE 1 CAPSULE BY MOUTH ONE TIME PER WEEK 4 capsule 2   • rosuvastatin (CRESTOR) 10 MG tablet TAKE 1 TABLET BY MOUTH EVERY DAY 90 tablet 2     No current facility-administered medications for this visit. Allergies   Allergen Reactions   • Iodinated Contrast Media        Review of Systems    Video Exam    There were no vitals filed for this visit.     Physical Exam Pt denied any SI/HI/AH/VH

## 2023-09-27 ENCOUNTER — OFFICE VISIT (OUTPATIENT)
Dept: FAMILY MEDICINE CLINIC | Facility: CLINIC | Age: 53
End: 2023-09-27
Payer: COMMERCIAL

## 2023-09-27 VITALS
SYSTOLIC BLOOD PRESSURE: 120 MMHG | DIASTOLIC BLOOD PRESSURE: 70 MMHG | HEIGHT: 64 IN | BODY MASS INDEX: 25.13 KG/M2 | OXYGEN SATURATION: 97 % | RESPIRATION RATE: 12 BRPM | WEIGHT: 147.2 LBS | HEART RATE: 82 BPM

## 2023-09-27 DIAGNOSIS — N18.31 STAGE 3A CHRONIC KIDNEY DISEASE (HCC): ICD-10-CM

## 2023-09-27 DIAGNOSIS — E28.319 EARLY MENOPAUSE: ICD-10-CM

## 2023-09-27 DIAGNOSIS — E78.5 HYPERLIPIDEMIA, UNSPECIFIED HYPERLIPIDEMIA TYPE: ICD-10-CM

## 2023-09-27 DIAGNOSIS — Z00.00 ANNUAL PHYSICAL EXAM: ICD-10-CM

## 2023-09-27 DIAGNOSIS — C18.9 MALIGNANT NEOPLASM OF COLON, UNSPECIFIED PART OF COLON (HCC): Primary | ICD-10-CM

## 2023-09-27 DIAGNOSIS — E55.9 VITAMIN D DEFICIENCY: ICD-10-CM

## 2023-09-27 PROCEDURE — 99396 PREV VISIT EST AGE 40-64: CPT

## 2023-09-27 NOTE — PROGRESS NOTES
[address] [facility] [address] [facility]    NAME: Mary Barboza  AGE: 50 y.o. SEX: female  : 1970     DATE: 2023     Assessment and Plan:     Problem List Items Addressed This Visit        Digestive    Malignant neoplasm of colon ([address] [facility]) - Primary     Continue with colonoscopies every 5 years as suggested by GI. Genitourinary    Stage 3a chronic kidney disease [facility])     Lab Results   Component Value Date    EGFR 73 11/15/2022    EGFR 78 2022    EGFR 63 2022    CREATININE 0.92 11/15/2022    CREATININE 0.87 2022    CREATININE 1.03 2022   Doing well. Continue to avoid NSAIDs. Stay hydrated. Relevant Orders    CBC and differential    Comprehensive metabolic panel    UA w Reflex to Microscopic w Reflex to Culture -Lab Collect       Other    Hyperlipidemia     Continue on Crestor 10 mg daily. Relevant Orders    Lipid panel    Vitamin D deficiency     Consider vitamin D and calcium supplementation for bone health. Relevant Orders    Vitamin D 25 hydroxy   Other Visit Diagnoses     Annual physical exam        Relevant Orders    CBC and differential    Comprehensive metabolic panel    UA w Reflex to Microscopic w Reflex to Culture -Lab Collect    Early menopause        Relevant Orders    DXA bone density spine hip and pelvis          Immunizations and preventive care screenings were discussed with patient today. Appropriate education was printed on patient's after visit summary. Counseling:  Alcohol/drug use: discussed moderation in alcohol intake, the recommendations for healthy alcohol use, and avoidance of illicit drug use. Dental Health: discussed importance of regular tooth brushing, flossing, and dental visits. Injury prevention: discussed safety/seat belts, safety helmets, smoke detectors, carbon dioxide detectors, and smoking near bedding or upholstery.   Sexual health: discussed sexually transmitted diseases, partner selection, use of condoms, avoidance of unintended pregnancy, and contraceptive alternatives. · Exercise: the importance of regular exercise/physical activity was discussed. Recommend exercise 3-5 times per week for at least 30 minutes. BMI Counseling: Body mass index is 25.27 kg/m². The BMI is above normal. Nutrition recommendations include decreasing portion sizes, encouraging healthy choices of fruits and vegetables, decreasing fast food intake, consuming healthier snacks, limiting drinks that contain sugar, moderation in carbohydrate intake, increasing intake of lean protein, reducing intake of saturated and trans fat and reducing intake of cholesterol. Exercise recommendations include moderate physical activity 150 minutes/week and exercising 3-5 times per week. Rationale for BMI follow-up plan is due to patient being overweight or obese. Return in 1 year (on 9/27/2024) for Annual physical w/PCP. Chief Complaint:     Chief Complaint   Patient presents with   • Follow-up     Due for mammo and dxa scan       History of Present Illness:     Adult Annual Physical   Patient here for a comprehensive physical exam. The patient reports no problems. Diet and Physical Activity  · Diet/Nutrition: well balanced diet. · Exercise: no formal exercise. Depression Screening  PHQ-2/9 Depression Screening         General Health  · Sleep: sleeps well and gets 7-8 hours of sleep on average. · Hearing: normal - bilateral.  · Vision: no vision problems and goes for regular eye exams. · Dental: regular dental visits and brushes teeth twice daily. /GYN Health  · Patient is: postmenopausal  · Last menstrual period: 2014  · Contraceptive method: none. Review of Systems:     Review of Systems   Constitutional: Negative for chills and fever. Respiratory: Negative for cough and shortness of breath.     Cardiovascular: Negative for chest pain.   Gastrointestinal: Negative for abdominal pain and vomiting. Genitourinary: Negative for dysuria. Musculoskeletal: Negative for arthralgias and back pain. Neurological: Negative for headaches. All other systems reviewed and are negative. Past Medical History:     Past Medical History:   Diagnosis Date   • Cancer [facility])    • Colon cancer ([address])    • History of chemotherapy     colon ca   • History of radiation therapy     colon ca   • History of rectal cancer     Locally advanced    • History of tear of meniscus of knee joint    • Hyperlipidemia    • Tennis elbow       Past Surgical History:     Past Surgical History:   Procedure Laterality Date   • AUGMENTATION MAMMAPLASTY Bilateral    • BREAST SURGERY Bilateral     RECONSTRUCION WITH IMPLANT PROTHESIS    • COLON SURGERY     • COLONOSCOPY  2021   • ELBOW SURGERY Left     Tennis elbow   • KNEE SURGERY     • MENISCECTOMY Left 2016      Social History:     Social History     Socioeconomic History   • Marital status: /Civil Union     Spouse name: None   • Number of children: None   • Years of education: None   • Highest education level: None   Occupational History   • Occupation: EMPLOYED   Tobacco Use   • Smoking status: Former     Types: Cigarettes     Quit date:      Years since quittin.7   • Smokeless tobacco: Never   Vaping Use   • Vaping Use: Never used   Substance and Sexual Activity   • Alcohol use:  Yes   • Drug use: No   • Sexual activity: Not Currently     Partners: Male     Birth control/protection: Post-menopausal   Other Topics Concern   • None   Social History Narrative   • None     Social Determinants of Health     Financial Resource Strain: Not on file   Food Insecurity: Not on file   Transportation Needs: Not on file   Physical Activity: Not on file   Stress: Not on file   Social Connections: Not on file   Intimate Partner Violence: Not on file   Housing Stability: Not on file      Family History:     Family History   Problem Relation Age of Onset   • Diabetes Mother    • Heart failure Mother    • Heart defect Father         CARDIAC PACEMAKER   • Hip fracture Father         HIP REPLACEMENT   • Breast cancer Sister 52   • BRCA1 Negative Sister    • BRCA2 Negative Sister    • Lung cancer Maternal Grandmother 72   • Breast cancer Maternal Aunt 53        again at 72   • No Known Problems Sister    • Colon cancer Maternal Aunt    • No Known Problems Paternal Aunt    • No Known Problems Paternal Aunt    • No Known Problems Paternal Aunt    • No Known Problems Paternal Aunt    • No Known Problems Paternal Grandmother    • Ovarian cancer Neg Hx    • Uterine cancer Neg Hx    • Cervical cancer Neg Hx    • Endometrial cancer Neg Hx       Current Medications:     Current Outpatient Medications   Medication Sig Dispense Refill   • rosuvastatin (CRESTOR) 10 MG tablet TAKE 1 TABLET BY MOUTH EVERY DAY 90 tablet 2     No current facility-administered medications for this visit. Allergies: Allergies   Allergen Reactions   • Iodinated Contrast Media       Physical Exam:     /70   Pulse 82   Resp 12   Ht 5' 4" (1.626 m)   Wt 66.8 kg (147 lb 3.2 oz)   LMP 07/01/2014 (Within Days) Comment: Medical induced menopsuse   SpO2 97%   BMI 25.27 kg/m²     Physical Exam  Vitals and nursing note reviewed. Constitutional:       General: She is not in acute distress. Appearance: Normal appearance. She is well-developed. She is not ill-appearing. Cardiovascular:      Rate and Rhythm: Normal rate and regular rhythm. Heart sounds: Normal heart sounds. No murmur heard. Pulmonary:      Effort: Pulmonary effort is normal. No respiratory distress. Breath sounds: Normal breath sounds. Musculoskeletal:         General: No swelling or tenderness. Normal range of motion. Right lower leg: No edema. Left lower leg: No edema. Skin:     General: Skin is warm and dry.       Capillary Refill: Capillary refill takes less than 2 seconds. Neurological:      Mental Status: She is alert and oriented to person, place, and time. Psychiatric:         Mood and Affect: Mood normal.         Behavior: Behavior normal.         Thought Content:  Thought content normal.         Judgment: Judgment normal.          Sahra Rainey, [address] [facility]

## 2023-09-27 NOTE — ASSESSMENT & PLAN NOTE
Lab Results   Component Value Date    EGFR 73 11/15/2022    EGFR 78 03/02/2022    EGFR 63 01/06/2022    CREATININE 0.92 11/15/2022    CREATININE 0.87 03/02/2022    CREATININE 1.03 01/06/2022   Doing well. Continue to avoid NSAIDs. Stay hydrated.

## 2023-09-28 ENCOUNTER — TELEMEDICINE (OUTPATIENT)
Dept: BEHAVIORAL/MENTAL HEALTH CLINIC | Facility: CLINIC | Age: 53
End: 2023-09-28
Payer: COMMERCIAL

## 2023-09-28 DIAGNOSIS — F41.9 ANXIETY: Primary | ICD-10-CM

## 2023-09-28 PROCEDURE — 90834 PSYTX W PT 45 MINUTES: CPT | Performed by: SOCIAL WORKER

## 2023-10-04 ENCOUNTER — TELEPHONE (OUTPATIENT)
Age: 53
End: 2023-10-04

## 2023-10-04 NOTE — PSYCH
Visit start and stop times:    9/28/23  Start Time: 0945  Stop Time: 1030  Total Visit Time: 45 minutes  Virtual Regular Visit  Therapist met w/pt for individual session. Pt stated that she has had "good days and bad days."  She stated that lately she has been in her head a little bit more about returning back to work but is challenging those anxious/negative thoughts. Pt stated that she is less angry/irritable overall and gave an example of the growth that she has made. Therapist and pt continued to discuss importance of taking time for herself, maintaining healthy boundaries w/others. She stated that she has been worried about her mom and guilty that she can't take care of her mother but realizes that that is not something she is capable of doing right now. Verification of patient location:    Patient is located at Home in the following state in which I hold an active license [place]      Assessment/Plan: f/u in 2-3 weeks    Problem List Items Addressed This Visit        Other    Anxiety - Primary              Reason for visit is No chief complaint on file. Encounter provider Nicanor Alvarez    Provider located at [address] [place] [address]      Recent Visits  Date Type Provider Dept   09/28/23 Telemedicine [address] [place] [address] recent visits within past 7 days and meeting all other requirements  Future Appointments  No visits were found meeting these conditions. Showing future appointments within next 150 days and meeting all other requirements       The patient was identified by name and date of birth. Lina Alegre was informed that this is a telemedicine visit and [facility] She agrees to proceed. .  My office door was closed. No one else was in the room.   She acknowledged consent and understanding of privacy and security of the video platform. The patient has agreed to participate and understands they can discontinue the visit at any time. Patient is aware this is a billable service. Subjective Ulysses Card is a 50 y.o. female  . HPI     Past Medical History:   Diagnosis Date   • Cancer [facility])    • Colon cancer ([address]) 2014   • History of chemotherapy 2014    colon ca   • History of radiation therapy 2014    colon ca   • History of rectal cancer 2014    Locally advanced    • History of tear of meniscus of knee joint    • Hyperlipidemia    • Tennis elbow        Past Surgical History:   Procedure Laterality Date   • AUGMENTATION MAMMAPLASTY Bilateral 2005   • BREAST SURGERY Bilateral     RECONSTRUCION WITH IMPLANT PROTHESIS    • COLON SURGERY     • COLONOSCOPY  04/2021   • ELBOW SURGERY Left 2015    Tennis elbow   • KNEE SURGERY     • MENISCECTOMY Left 06/2016       Current Outpatient Medications   Medication Sig Dispense Refill   • rosuvastatin (CRESTOR) 10 MG tablet TAKE 1 TABLET BY MOUTH EVERY DAY 90 tablet 2     No current facility-administered medications for this visit. Allergies   Allergen Reactions   • Iodinated Contrast Media        Review of Systems    Video Exam    There were no vitals filed for this visit.     Physical Exam Pt denied any SI/HI/Ah/Vh

## 2023-10-04 NOTE — TELEPHONE ENCOUNTER
Rec'd call from patient requesting NEW for FULL BODY. Explained wait/cancellation list and MyChart notification. Understanding was verbalized. Created wait/cancellation list for patient.

## 2023-10-06 ENCOUNTER — APPOINTMENT (OUTPATIENT)
Dept: LAB | Facility: HOSPITAL | Age: 53
End: 2023-10-06
Payer: COMMERCIAL

## 2023-10-06 DIAGNOSIS — E55.9 VITAMIN D DEFICIENCY: ICD-10-CM

## 2023-10-06 DIAGNOSIS — Z00.00 ANNUAL PHYSICAL EXAM: ICD-10-CM

## 2023-10-06 DIAGNOSIS — N18.31 STAGE 3A CHRONIC KIDNEY DISEASE (HCC): ICD-10-CM

## 2023-10-06 DIAGNOSIS — E78.5 HYPERLIPIDEMIA, UNSPECIFIED HYPERLIPIDEMIA TYPE: ICD-10-CM

## 2023-10-06 LAB
25(OH)D3 SERPL-MCNC: 24.9 NG/ML (ref 30–100)
ALBUMIN SERPL BCP-MCNC: 4.1 G/DL (ref 3.5–5)
ALP SERPL-CCNC: 71 U/L (ref 34–104)
ALT SERPL W P-5'-P-CCNC: 18 U/L (ref 7–52)
ANION GAP SERPL CALCULATED.3IONS-SCNC: 2 MMOL/L
AST SERPL W P-5'-P-CCNC: 19 U/L (ref 13–39)
BASOPHILS # BLD AUTO: 0.05 THOUSANDS/ÂΜL (ref 0–0.1)
BASOPHILS NFR BLD AUTO: 1 % (ref 0–1)
BILIRUB SERPL-MCNC: 0.38 MG/DL (ref 0.2–1)
BILIRUB UR QL STRIP: NEGATIVE
BUN SERPL-MCNC: 14 MG/DL (ref 5–25)
CALCIUM SERPL-MCNC: 9.1 MG/DL (ref 8.4–10.2)
CHLORIDE SERPL-SCNC: 108 MMOL/L (ref 96–108)
CHOLEST SERPL-MCNC: 190 MG/DL
CLARITY UR: NORMAL
CO2 SERPL-SCNC: 29 MMOL/L (ref 21–32)
COLOR UR: NORMAL
CREAT SERPL-MCNC: 0.83 MG/DL (ref 0.6–1.3)
EOSINOPHIL # BLD AUTO: 0.46 THOUSAND/ÂΜL (ref 0–0.61)
EOSINOPHIL NFR BLD AUTO: 6 % (ref 0–6)
ERYTHROCYTE [DISTWIDTH] IN BLOOD BY AUTOMATED COUNT: 13 % (ref 11.6–15.1)
GFR SERPL CREATININE-BSD FRML MDRD: 82 ML/MIN/1.73SQ M
GLUCOSE P FAST SERPL-MCNC: 83 MG/DL (ref 65–99)
GLUCOSE UR STRIP-MCNC: NEGATIVE MG/DL
HCT VFR BLD AUTO: 44.7 % (ref 34.8–46.1)
HDLC SERPL-MCNC: 75 MG/DL
HGB BLD-MCNC: 14.6 G/DL (ref 11.5–15.4)
HGB UR QL STRIP.AUTO: NEGATIVE
IMM GRANULOCYTES # BLD AUTO: 0.02 THOUSAND/UL (ref 0–0.2)
IMM GRANULOCYTES NFR BLD AUTO: 0 % (ref 0–2)
KETONES UR STRIP-MCNC: NEGATIVE MG/DL
LDLC SERPL CALC-MCNC: 99 MG/DL (ref 0–100)
LEUKOCYTE ESTERASE UR QL STRIP: NEGATIVE
LYMPHOCYTES # BLD AUTO: 2.94 THOUSANDS/ÂΜL (ref 0.6–4.47)
LYMPHOCYTES NFR BLD AUTO: 36 % (ref 14–44)
MCH RBC QN AUTO: 30.9 PG (ref 26.8–34.3)
MCHC RBC AUTO-ENTMCNC: 32.7 G/DL (ref 31.4–37.4)
MCV RBC AUTO: 95 FL (ref 82–98)
MONOCYTES # BLD AUTO: 0.7 THOUSAND/ÂΜL (ref 0.17–1.22)
MONOCYTES NFR BLD AUTO: 9 % (ref 4–12)
NEUTROPHILS # BLD AUTO: 4.11 THOUSANDS/ÂΜL (ref 1.85–7.62)
NEUTS SEG NFR BLD AUTO: 48 % (ref 43–75)
NITRITE UR QL STRIP: NEGATIVE
NONHDLC SERPL-MCNC: 115 MG/DL
NRBC BLD AUTO-RTO: 0 /100 WBCS
PH UR STRIP.AUTO: 6.5 [PH]
PLATELET # BLD AUTO: 355 THOUSANDS/UL (ref 149–390)
PMV BLD AUTO: 9.1 FL (ref 8.9–12.7)
POTASSIUM SERPL-SCNC: 4.1 MMOL/L (ref 3.5–5.3)
PROT SERPL-MCNC: 6.5 G/DL (ref 6.4–8.4)
PROT UR STRIP-MCNC: NEGATIVE MG/DL
RBC # BLD AUTO: 4.73 MILLION/UL (ref 3.81–5.12)
SODIUM SERPL-SCNC: 139 MMOL/L (ref 135–147)
SP GR UR STRIP.AUTO: 1.02 (ref 1–1.03)
TRIGL SERPL-MCNC: 80 MG/DL
UROBILINOGEN UR STRIP-ACNC: <2 MG/DL
WBC # BLD AUTO: 8.28 THOUSAND/UL (ref 4.31–10.16)

## 2023-10-06 PROCEDURE — 80053 COMPREHEN METABOLIC PANEL: CPT

## 2023-10-06 PROCEDURE — 82306 VITAMIN D 25 HYDROXY: CPT

## 2023-10-06 PROCEDURE — 80061 LIPID PANEL: CPT

## 2023-10-06 PROCEDURE — 36415 COLL VENOUS BLD VENIPUNCTURE: CPT

## 2023-10-06 PROCEDURE — 81003 URINALYSIS AUTO W/O SCOPE: CPT

## 2023-10-06 PROCEDURE — 85025 COMPLETE CBC W/AUTO DIFF WBC: CPT

## 2023-10-19 ENCOUNTER — TELEMEDICINE (OUTPATIENT)
Dept: BEHAVIORAL/MENTAL HEALTH CLINIC | Facility: CLINIC | Age: 53
End: 2023-10-19
Payer: COMMERCIAL

## 2023-10-19 DIAGNOSIS — F41.8 DEPRESSION WITH ANXIETY: Primary | ICD-10-CM

## 2023-10-19 PROCEDURE — 90834 PSYTX W PT 45 MINUTES: CPT | Performed by: SOCIAL WORKER

## 2023-10-24 NOTE — PSYCH
Visit start and stop times:    10/19/23  Start Time: 1430  Stop Time: 1515  Total Visit Time: 45 minutes  Virtual Regular Visit  Therapist met w/pt for individual session. Pt stated she just had her granddaughter over for a week and she really enjoyed herself. She stated that she was able to be in the moment and present. She stated in the past she would feel irritable and rushed because of work but she felt so relaxed due to not working. Pt stated that she has seen an improvement w/her overall mood since being away from work but also realizes she needs to obtain employment. Therapist and pt discussed the fear and anxiety pt has and steps pt can take to work through that. Verification of patient location:    Patient is located at Home in the following state in which I hold an active license [place]      Assessment/Plan:    Problem List Items Addressed This Visit          Other    Depression with anxiety - Primary                Reason for visit is   Chief Complaint   Patient presents with    Virtual Regular Visit          Encounter provider Manuel Ta    Provider located at [address] [place] [address] [address] [facility] [address] [place] [id number] [phone]      Recent Visits  Date Type Provider Dept   10/19/23 Telemedicine [address] [place] [address] recent visits within past 7 days and meeting all other requirements  Future Appointments  No visits were found meeting these conditions. Showing future appointments within next 150 days and meeting all other requirements       The patient was identified by name and date of birth. King Haney was informed that this is a telemedicine visit and [facility] She agrees to proceed. .  My office door was closed. No one else was in the room.   She acknowledged consent and understanding of privacy and security of the video platform. The patient has agreed to participate and understands they can discontinue the visit at any time. Patient is aware this is a billable service. Raul Nielsen is a 50 y.o. female  . HPI     Past Medical History:   Diagnosis Date    Cancer [facility])     Colon cancer ([address]) 2014    History of chemotherapy 2014    colon ca    History of radiation therapy 2014    colon ca    History of rectal cancer 2014    Locally advanced     History of tear of meniscus of knee joint     Hyperlipidemia     Tennis elbow        Past Surgical History:   Procedure Laterality Date    AUGMENTATION MAMMAPLASTY Bilateral 2005    BREAST SURGERY Bilateral     RECONSTRUCION WITH IMPLANT PROTHESIS     COLON SURGERY      COLONOSCOPY  04/2021    ELBOW SURGERY Left 2015    Tennis elbow    KNEE SURGERY      MENISCECTOMY Left 06/2016       Current Outpatient Medications   Medication Sig Dispense Refill    rosuvastatin (CRESTOR) 10 MG tablet TAKE 1 TABLET BY MOUTH EVERY DAY 90 tablet 2     No current facility-administered medications for this visit. Allergies   Allergen Reactions    Iodinated Contrast Media        Review of Systems    Video Exam    There were no vitals filed for this visit.     Physical Exam Pt denied any SI/HI/Ah/Vh

## 2023-11-08 ENCOUNTER — TELEMEDICINE (OUTPATIENT)
Dept: BEHAVIORAL/MENTAL HEALTH CLINIC | Facility: CLINIC | Age: 53
End: 2023-11-08
Payer: COMMERCIAL

## 2023-11-08 DIAGNOSIS — F41.8 DEPRESSION WITH ANXIETY: Primary | ICD-10-CM

## 2023-11-08 PROCEDURE — 90834 PSYTX W PT 45 MINUTES: CPT | Performed by: SOCIAL WORKER

## 2023-11-10 NOTE — PSYCH
Visit start and stop times:    11/8/23  Start Time: 0800  Stop Time: 0845  Total Visit Time: 45 minutes  Virtual Regular Visit  Therapist met w/pt for individual session. Pt stated that overall she is doing well. She stated that her mother isn't doing too well and there is some family disagreements but she is proud of herself for how she is navigating her feelings. Pt stated that she has been the "voice of reason" which when she was working never would have been the case. Pt stated she hasn't applied to any jobs but has been looking. She stated that she doesn't want to get into a job right now due to not knowing what is going to happen with her mother. Therapist and pt discussed different things that pt is doing to keep herself busy and manage her overall emotional health. Verification of patient location:    Patient is located at Home in the following state in which I hold an active license [place]      Assessment/Plan: f/u in 3 weeks    Problem List Items Addressed This Visit          Other    Depression with anxiety - Primary              Reason for visit is No chief complaint on file. Encounter provider Victorina Garvey    Provider located at [address]      Recent Visits  Date Type Provider Dept   11/08/23 Telemedicine [address] [place] [address] recent visits within past 7 days and meeting all other requirements  Future Appointments  No visits were found meeting these conditions. Showing future appointments within next 150 days and meeting all other requirements       The patient was identified by name and date of birth. Mary Barboza was informed that this is a telemedicine visit and [facility] She agrees to proceed. .  My office door was closed.  No one else was in the room. She acknowledged consent and understanding of privacy and security of the video platform. The patient has agreed to participate and understands they can discontinue the visit at any time. Patient is aware this is a billable service. Raul Snow is a 50 y.o. female  . HPI     Past Medical History:   Diagnosis Date    Cancer [facility])     Colon cancer ([address]) 2014    History of chemotherapy 2014    colon ca    History of radiation therapy 2014    colon ca    History of rectal cancer 2014    Locally advanced     History of tear of meniscus of knee joint     Hyperlipidemia     Tennis elbow        Past Surgical History:   Procedure Laterality Date    AUGMENTATION MAMMAPLASTY Bilateral 2005    BREAST SURGERY Bilateral     RECONSTRUCION WITH IMPLANT PROTHESIS     COLON SURGERY      COLONOSCOPY  04/2021    ELBOW SURGERY Left 2015    Tennis elbow    KNEE SURGERY      MENISCECTOMY Left 06/2016       Current Outpatient Medications   Medication Sig Dispense Refill    rosuvastatin (CRESTOR) 10 MG tablet TAKE 1 TABLET BY MOUTH EVERY DAY 90 tablet 2     No current facility-administered medications for this visit. Allergies   Allergen Reactions    Iodinated Contrast Media        Review of Systems    Video Exam    There were no vitals filed for this visit.     Physical Exam  Pt denied any SI/HI/Ah/VH

## 2023-11-30 ENCOUNTER — HOSPITAL ENCOUNTER (OUTPATIENT)
Age: 53
Discharge: HOME | End: 2023-11-30
Payer: COMMERCIAL

## 2023-11-30 VITALS — HEIGHT: 62 IN | BODY MASS INDEX: 26.87 KG/M2 | WEIGHT: 146 LBS

## 2023-11-30 DIAGNOSIS — E28.319 EARLY MENOPAUSE: ICD-10-CM

## 2023-11-30 PROCEDURE — 77080 DXA BONE DENSITY AXIAL: CPT

## 2023-12-05 ENCOUNTER — TELEMEDICINE (OUTPATIENT)
Dept: BEHAVIORAL/MENTAL HEALTH CLINIC | Facility: CLINIC | Age: 53
End: 2023-12-05
Payer: COMMERCIAL

## 2023-12-05 DIAGNOSIS — F41.8 DEPRESSION WITH ANXIETY: Primary | ICD-10-CM

## 2023-12-05 PROCEDURE — 90834 PSYTX W PT 45 MINUTES: CPT | Performed by: SOCIAL WORKER

## 2023-12-07 NOTE — PSYCH
Visit start and stop times:    23  Start Time: 08  Stop Time: 930  Total Visit Time: 45 minutes  Virtual Regular Visit  Therapist met w/pt for individual session. Pt stated that her mother . Therapist and pt discussed how her mother went into hospice last weekend. She stated her son and his family were over and it was a good distraction. Therapist and pt began to process her feelings/emotions. Pt stated that she is starting to get angry with her one sister and working on navigating those feelings. Therapist and pt discussed strategies to help pt manage her feelings rather than lashing out. Verification of patient location:    Patient is located at Home in the following state in which I hold an active license [place]      Assessment/Plan: f/u in 2-3 weeks    Problem List Items Addressed This Visit    None             Reason for visit is No chief complaint on file. Encounter provider Manuela Kaba    Provider located at [address]      Recent Visits  Date Type Provider Dept   23 Telemedicine [address] [place] [address] recent visits within past 7 days and meeting all other requirements  Future Appointments  No visits were found meeting these conditions. Showing future appointments within next 150 days and meeting all other requirements       The patient was identified by name and date of birth. Steve Sellers was informed that this is a telemedicine visit and [facility] She agrees to proceed. .  My office door was closed. No one else was in the room. She acknowledged consent and understanding of privacy and security of the video platform.  The patient has agreed to participate and understands they can discontinue the visit at any time.    Patient is aware this is a billable service. Raul Tom is a 50 y.o. female  . HPI     Past Medical History:   Diagnosis Date    Cancer [facility])     Colon cancer ([address]) 2014    History of chemotherapy 2014    colon ca    History of radiation therapy 2014    colon ca    History of rectal cancer 2014    Locally advanced     History of tear of meniscus of knee joint     Hyperlipidemia     Tennis elbow        Past Surgical History:   Procedure Laterality Date    AUGMENTATION MAMMAPLASTY Bilateral 2005    BREAST SURGERY Bilateral     RECONSTRUCION WITH IMPLANT PROTHESIS     COLON SURGERY      COLONOSCOPY  04/2021    ELBOW SURGERY Left 2015    Tennis elbow    KNEE SURGERY      MENISCECTOMY Left 06/2016       Current Outpatient Medications   Medication Sig Dispense Refill    rosuvastatin (CRESTOR) 10 MG tablet TAKE 1 TABLET BY MOUTH EVERY DAY 90 tablet 2     No current facility-administered medications for this visit. Allergies   Allergen Reactions    Iodinated Contrast Media        Review of Systems    Video Exam    There were no vitals filed for this visit.     Physical Exam  Pt denied any SI/HI/AH/VH

## 2023-12-11 NOTE — PROGRESS NOTES
Assessment/Plan:    Encounter for annual routine gynecological examination  Pap UTD  SBE encouraged. CBE annually. Screening mammogram due, order active in chart. ABUS due in May  Continue care with [facility] for colon cancer screening and f/u  Perineal hygiene reviewed. Healthy lifestyle encouraged. F/u annually and PRN. Diagnoses and all orders for this visit:    Encounter for annual routine gynecological examination    Encounter for screening mammogram for malignant neoplasm of breast    Family history of breast cancer in first degree relative  -      breast screening bilateral complete (ABUS); Future          Health Maintenance:    Last PAP: 2022. Neg/neg  Next PAP Due: 2025    Last Mammogram: 2022. BI-RADS 2, ABUS 2023  Next Mammogram: due    Last Colonoscopy: Not on file     Subjective    CC: Yearly Exam     Alisa Singer is a 50 y.o. postmenopausal female here for annual exam.     Menarche: 17 Y/O    Sexual activity: She is not sexually active  STD testing:  She does not want STD testing today. Former smoker, social drinker    H/o colon cancer s/p chemo and radiation. She follows with Juanita Pearson for her colonoscopies. Last one was in April of this year. Denies PMB. She denies breast concerns, vaginal issues, pelvic pain, dyspareunia, urinary symptoms, symptoms of pelvic organ prolapse, urinary, or fecal incontinence today.      Family hx of breast cancer: Yes   Family hx of ovarian cancer: No  Family hx of colon cancer: Yes      Past Medical History:   Diagnosis Date    Cancer ([address])     Colon cancer ([address]) 2014    History of chemotherapy 2014    colon ca    History of radiation therapy 2014    colon ca    History of rectal cancer 2014    Locally advanced     History of tear of meniscus of knee joint     Hyperlipidemia     Tennis elbow      Past Surgical History:   Procedure Laterality Date    AUGMENTATION MAMMAPLASTY Bilateral 2005    BREAST SURGERY Bilateral RECONSTRUCION WITH IMPLANT PROTHESIS     COLON SURGERY      COLONOSCOPY  2021    ELBOW SURGERY Left     Tennis elbow    KNEE SURGERY      MENISCECTOMY Left 2016      Family History   Problem Relation Age of Onset    Diabetes Mother     Heart failure Mother     Heart defect Father         CARDIAC PACEMAKER    Hip fracture Father         HIP REPLACEMENT    Breast cancer Sister 52    BRCA1 Negative Sister     BRCA2 Negative Sister     Lung cancer Maternal Grandmother 72    Breast cancer Maternal Aunt 46        again at 72    No Known Problems Sister     Colon cancer Maternal Aunt     No Known Problems Paternal Aunt     No Known Problems Paternal Aunt     No Known Problems Paternal Aunt     No Known Problems Paternal Aunt     No Known Problems Paternal Grandmother     Ovarian cancer Neg Hx     Uterine cancer Neg Hx     Cervical cancer Neg Hx     Endometrial cancer Neg Hx      Social History     Tobacco Use    Smoking status: Former     Types: Cigarettes     Quit date:      Years since quittin.9    Smokeless tobacco: Never   Vaping Use    Vaping Use: Never used   Substance Use Topics    Alcohol use: Yes    Drug use: No       Current Outpatient Medications:     rosuvastatin (CRESTOR) 10 MG tablet, TAKE 1 TABLET BY MOUTH EVERY DAY, Disp: 90 tablet, Rfl: 2  Patient Active Problem List    Diagnosis Date Noted    Encounter for annual routine gynecological examination 2023    Vitamin D deficiency 2023    Knee joint contracture, left 2022    Rosacea 2022    Weight loss 2022    Diarrhea 2022    Family history of breast cancer in first degree relative 2021    Stage 3a chronic kidney disease ([address]) 10/06/2020    Drug-related hair loss 2020    Depression with anxiety 2020    History of colon cancer 2020    Asymptomatic postmenopausal status 2020    Dense breast tissue 2020    Anxiety 2019    Lower extremity pain, left 2018    Tear of meniscus of left knee 2018    Malignant neoplasm of colon ([address]) 2017    Early menopause occurring in patient age younger than 39 years 2017    Hyperlipidemia 2017    Trochanteric bursitis of left hip 2017    History of radiation therapy 2014    History of chemotherapy 2014       Allergies   Allergen Reactions    Iodinated Contrast Media        OB History    Para Term  AB Living   1 1 1     1   SAB IAB Ectopic Multiple Live Births           1      # Outcome Date GA Lbr Issa/2nd Weight Sex Delivery Anes PTL Lv   1 Term      Vag-Spont   YAMILET      Obstetric Comments   Menarche 17 y.o       Vitals:    23 0714   BP: 120/80   BP Location: Left arm   Patient Position: Sitting   Weight: 68.4 kg (150 lb 12.8 oz)   Height: 5' 2.25" (1.581 m)     Body mass index is 27.36 kg/m². Review of Systems   Constitutional:  Negative for chills and fever. Respiratory:  Negative for shortness of breath. Cardiovascular:  Negative for chest pain. Gastrointestinal:  Negative for abdominal pain, constipation, diarrhea, nausea and vomiting. Genitourinary:  Negative for difficulty urinating, dysuria, frequency, pelvic pain, urgency, vaginal bleeding, vaginal discharge and vaginal pain. Musculoskeletal:  Negative for back pain and myalgias. Skin:  Negative for pallor and rash. Neurological:  Negative for headaches. Hematological:  Negative for adenopathy. Psychiatric/Behavioral:  Negative for dysphoric mood. Physical Exam  Constitutional:       General: She is not in acute distress. Appearance: Normal appearance. She is not ill-appearing. Genitourinary:      No lesions in the vagina. Right Labia: No rash, tenderness, lesions or skin changes. Left Labia: No tenderness, lesions, skin changes or rash. No inguinal adenopathy present in the right or left side. No vaginal discharge, erythema, tenderness, bleeding or ulceration. Right Adnexa: not tender, not full and no mass present. Left Adnexa: not tender, not full and no mass present. Cervix is parous. No cervical motion tenderness, discharge, friability, lesion, polyp or nabothian cyst.      Uterus is not enlarged, fixed or tender. No uterine mass detected. No urethral prolapse, tenderness or discharge present. Bladder is not tender and urgency on palpation not present. Pelvic exam was performed with patient in the lithotomy position. Breasts:     Right: No swelling, inverted nipple, mass, nipple discharge, skin change or tenderness. Left: No swelling, inverted nipple, mass, nipple discharge, skin change or tenderness. HENT:      Head: Normocephalic and atraumatic. Eyes:      Conjunctiva/sclera: Conjunctivae normal.   Pulmonary:      Effort: Pulmonary effort is normal.   Abdominal:      General: There is no distension. Palpations: Abdomen is soft. Tenderness: There is no abdominal tenderness. Musculoskeletal:         General: Normal range of motion. Cervical back: Neck supple. Lymphadenopathy:      Upper Body:      Right upper body: No supraclavicular or axillary adenopathy. Left upper body: No supraclavicular or axillary adenopathy. Lower Body: No right inguinal adenopathy. No left inguinal adenopathy. Neurological:      Mental Status: She is alert and oriented to person, place, and time. Skin:     General: Skin is warm and dry. Psychiatric:         Mood and Affect: Mood normal.         Behavior: Behavior normal.         Thought Content: Thought content normal.         Judgment: Judgment normal.   Vitals and nursing note reviewed.

## 2023-12-12 ENCOUNTER — ANNUAL EXAM (OUTPATIENT)
Dept: OBGYN CLINIC | Facility: CLINIC | Age: 53
End: 2023-12-12

## 2023-12-12 VITALS
BODY MASS INDEX: 27.75 KG/M2 | HEIGHT: 62 IN | SYSTOLIC BLOOD PRESSURE: 120 MMHG | DIASTOLIC BLOOD PRESSURE: 80 MMHG | WEIGHT: 150.8 LBS

## 2023-12-12 DIAGNOSIS — Z80.3 FAMILY HISTORY OF BREAST CANCER IN FIRST DEGREE RELATIVE: ICD-10-CM

## 2023-12-12 DIAGNOSIS — Z12.31 ENCOUNTER FOR SCREENING MAMMOGRAM FOR MALIGNANT NEOPLASM OF BREAST: ICD-10-CM

## 2023-12-12 DIAGNOSIS — Z01.419 ENCOUNTER FOR ANNUAL ROUTINE GYNECOLOGICAL EXAMINATION: Primary | ICD-10-CM

## 2023-12-12 PROBLEM — R63.4 WEIGHT LOSS: Status: ACTIVE | Noted: 2022-07-26

## 2023-12-12 NOTE — PATIENT INSTRUCTIONS
Breast Self Exam for Women   AMBULATORY CARE:   A breast self-exam (BSE)  is a way to check your breasts for lumps and other changes. Regular BSEs can help you know how your breasts normally look and feel. Most breast lumps or changes are not cancer, but you should always have them checked by a healthcare provider. Why you should do a BSE:  Breast cancer is the most common type of cancer in women. Even if you have mammograms, you may still want to do a BSE regularly. If you know how your breasts normally feel and look, it may help you know when to contact your healthcare provider. Mammograms can miss some cancers. You may find a lump during a BSE that did not show up on a mammogram.  When you should do a BSE:  If you have periods, you may want to do your BSE 1 week after your period ends. This is the time when your breasts may be the least swollen, lumpy, or tender. You can do regular BSEs even if you are breastfeeding or have breast implants. Call your doctor if:   You find any lumps or changes in your breasts. You have breast pain or fluid coming from your nipples. You have questions or concerns about your condition or care. How to do a BSE:       Look at your breasts in a mirror. Look at the size and shape of each breast and nipple. Check for swelling, lumps, dimpling, scaly skin, or other skin changes. Look for nipple changes, such as a nipple that is painful or beginning to pull inward. Gently squeeze both nipples and check to see if fluid (that is not breast milk) comes out of them. If you find any of these or other breast changes, contact your healthcare provider. Check your breasts while you sit or  the following 3 positions:    [place] your arms down at your sides. Raise your hands and join them behind your head. Put firm pressure with your hands on your hips. Bend slightly forward while you look at your breasts in the mirror. Lie down and feel your breasts.   When you lie down, your breast tissue spreads out evenly over your chest. This makes it easier for you to feel for lumps and anything that may not be normal for your breasts. Do a BSE on one breast at a time. Place a small pillow or towel under your left shoulder. Put your left arm behind your head. Use the 3 middle fingers of your right hand. Use your fingertip pads, on the top of your fingers. Your fingertip pad is the most sensitive part of your finger. Use small circles to feel your breast tissue. Use your fingertip pads to make dime-sized, overlapping circles on your breast and armpits. Use light, medium, and firm pressure. First, press lightly. Second, press with medium pressure to feel a little deeper into the breast. Last, use firm pressure to feel deep within your breast.    Examine your entire breast area. Examine the breast area from above the breast to below the breast where you feel only ribs. Make small circles with your fingertips, starting in the middle of your armpit. Make circles going up and down the breast area. Continue toward your breast and all the way across it. Examine the area from your armpit all the way over to the middle of your chest (breastbone). Stop at the middle of your chest.    Move the pillow or towel to your right shoulder, and put your right arm behind your head. Use the 3 fingertip pads of your left hand, and repeat the above steps to do a BSE on your right breast.  What else you can do to check for breast problems or cancer:  Talk to your healthcare provider about mammograms. A mammogram is an x-ray of your breasts to screen for breast cancer or other problems. Your provider can tell you the benefits and risks of mammograms. The first mammogram is usually at age 39 or 48. Your provider may recommend you start at 36 or younger if your risk for breast cancer is high. Mammograms usually continue every 1 to 2 years until age 76.        Follow up with your doctor as directed:  Write down your questions so you remember to ask them during your visits. © Copyright Selam Escalona 2023 Information is for End User's use only and may not be sold, redistributed or otherwise used for commercial purposes. The above information is an  only. It is not intended as medical advice for individual conditions or treatments. Talk to your doctor, nurse or pharmacist before following any medical regimen to see if it is safe and effective for you.

## 2023-12-12 NOTE — ASSESSMENT & PLAN NOTE
Pap UTD  SBE encouraged. CBE annually. Screening mammogram due, order active in chart. ABUS due in May  Continue care with [facility] for colon cancer screening and f/u  Perineal hygiene reviewed. Healthy lifestyle encouraged. F/u annually and PRN.

## 2023-12-20 ENCOUNTER — TELEMEDICINE (OUTPATIENT)
Dept: BEHAVIORAL/MENTAL HEALTH CLINIC | Facility: CLINIC | Age: 53
End: 2023-12-20
Payer: COMMERCIAL

## 2023-12-20 DIAGNOSIS — F41.9 ANXIETY: Primary | ICD-10-CM

## 2023-12-20 PROCEDURE — 90834 PSYTX W PT 45 MINUTES: CPT | Performed by: SOCIAL WORKER

## 2023-12-20 NOTE — PSYCH
Visit start and stop times:    12/20/23  Start Time: 1320  Stop Time: 1405  Total Visit Time: 45 minutes  Virtual Regular Visit  Therapist met w/pt for individual session.  Pt shared that she continues to grieve her mother.  Pt shared she has been having bouts of anger/resentments she is starting to work through.  Therapist and pt continued to process these feelings and then also began to talk about what pt plans on doing for the future.  Pt stated that she continues to work on taking care of herself and engaging in self care.      Verification of patient location:    Patient is located at Home in the following state in which I hold an active license [place]      Assessment/Plan: f/u in 2-3 weeks    Problem List Items Addressed This Visit          Other    Anxiety - Primary              Reason for visit is No chief complaint on file.       Encounter provider Oumou Lin    Provider located at [facility] [address]  [facility] [address]  [address]  [place] [address] [phone]      Recent Visits  No visits were found meeting these conditions.  Showing recent visits within past 7 days and meeting all other requirements  Today's Visits  Date Type Provider Dept   12/20/23 Telemedicine O[facility] Lin [facility] [address]   Showing today's visits and meeting all other requirements  Future Appointments  No visits were found meeting these conditions.  Showing future appointments within next 150 days and meeting all other requirements       The patient was identified by name and date of birth. Aruna Quiros was informed that this is a telemedicine visit and that the visit is being conducted throughthe Epic Embedded platform. She agrees to proceed..  My office door was closed. No one else was in the room.  She acknowledged consent and understanding of privacy and security of the video platform. The patient has agreed to  participate and understands they can discontinue the visit at any time.    Patient is aware this is a billable service.     Subjective  Aruna Quiros is a 52 y.o. female  .      HPI     Past Medical History:   Diagnosis Date    Cancer ([facility])     Colon cancer (H[facility]) 2014    History of chemotherapy 2014    colon ca    History of radiation therapy 2014    colon ca    History of rectal cancer 2014    Locally advanced     History of tear of meniscus of knee joint     Hyperlipidemia     Tennis elbow        Past Surgical History:   Procedure Laterality Date    AUGMENTATION MAMMAPLASTY Bilateral 2005    BREAST SURGERY Bilateral     RECONSTRUCION WITH IMPLANT PROTHESIS     COLON SURGERY      COLONOSCOPY  04/2021    ELBOW SURGERY Left 2015    Tennis elbow    KNEE SURGERY      MENISCECTOMY Left 06/2016       Current Outpatient Medications   Medication Sig Dispense Refill    rosuvastatin (CRESTOR) 10 MG tablet TAKE 1 TABLET BY MOUTH EVERY DAY 90 tablet 2     No current facility-administered medications for this visit.        Allergies   Allergen Reactions    Iodinated Contrast Media        Review of Systems    Video Exam    There were no vitals filed for this visit.    Physical Exam pt denied any SI/HI/AH/Vh

## 2024-01-10 ENCOUNTER — SOCIAL WORK (OUTPATIENT)
Dept: BEHAVIORAL/MENTAL HEALTH CLINIC | Facility: CLINIC | Age: 54
End: 2024-01-10
Payer: COMMERCIAL

## 2024-01-10 DIAGNOSIS — F41.8 DEPRESSION WITH ANXIETY: Primary | ICD-10-CM

## 2024-01-10 PROCEDURE — 90834 PSYTX W PT 45 MINUTES: CPT | Performed by: SOCIAL WORKER

## 2024-01-12 NOTE — PSYCH
"Behavioral Health Psychotherapy Progress Note    Psychotherapy Provided: Individual Psychotherapy     No diagnosis found.      DATA: Therapist met w/pt for individual session.  Pt stated she just got back from watching her grandchildren.  She continues to enjoy like without working and is anxious about returning.  Therapist and pt discussed pt's fears about applying and getting back to work but also pt realizing she needs to get back to work.  Pt shared that she is getting bored and needs a challenge.  Therapist continued to challenge some of pt's anxious thoughts.  Therapist and pt continued to talk about pt's grief w/losing her mother recently.     During this session, this clinician used the following therapeutic modalities: Client-centered Therapy and Cognitive Behavioral Therapy    Substance Abuse was not addressed during this session. If the client is diagnosed with a co-occurring substance use disorder, please indicate any changes in the frequency or amount of use: unknown. Stage of change for addressing substance use diagnoses: No substance use/Not applicable    ASSESSMENT:  Aruna Quiros presents with a Euthymic/ normal mood.     her affect is Normal range and intensity, which is congruent, with her mood and the content of the session. The client has made progress on their goals.     Aruna Quiros presents with a none risk of suicide, none risk of self-harm, and none risk of harm to others.    For any risk assessment that surpasses a \"low\" rating, a safety plan must be developed.    A safety plan was indicated: no  If yes, describe in detail n/a    PLAN: Between sessions, Aruna Quiros will start applying to jobs.   At the next session, the therapist will use Client-centered Therapy and Cognitive Behavioral Therapy to address symptoms of anxiety/depression.    Behavioral Health Treatment Plan and Discharge Planning: Aruna Quiros is aware of and agrees to continue to work on their treatment plan. They have " identified and are working toward their discharge goals. yes    Visit start and stop times:    01/10/24  Start Time: 1015  Stop Time: 1100  Total Visit Time: 45 minutes

## 2024-01-13 ENCOUNTER — HOSPITAL ENCOUNTER (OUTPATIENT)
Dept: MAMMOGRAPHY | Facility: CLINIC | Age: 54
Discharge: HOME | End: 2024-01-13
Payer: COMMERCIAL

## 2024-01-13 DIAGNOSIS — Z12.31 SCREENING MAMMOGRAM FOR BREAST CANCER: ICD-10-CM

## 2024-01-13 PROCEDURE — 77063 BREAST TOMOSYNTHESIS BI: CPT

## 2024-01-13 PROCEDURE — 77067 SCR MAMMO BI INCL CAD: CPT

## 2024-01-31 ENCOUNTER — TELEMEDICINE (OUTPATIENT)
Dept: BEHAVIORAL/MENTAL HEALTH CLINIC | Facility: CLINIC | Age: 54
End: 2024-01-31

## 2024-01-31 DIAGNOSIS — F41.8 DEPRESSION WITH ANXIETY: Primary | ICD-10-CM

## 2024-01-31 NOTE — PSYCH
"    Visit start and stop times:    01/31/24  Start Time: 1435  Stop Time: 1510  Total Visit Time: 35 minutes  Virtual Regular Visit  Therapist met w/pt for individual session.  Pt stated that she feels a sense of calmness.  She shared that she has been waiting to find this feeling for a long time and isn't in a rush to get back to work.  Pt expressed that she does look at linked in and other sites but nothing has \"jumped\" out at her.  Therapist and pt discussed how she has changed her thinking and been putting herself first which has really made a difference.  She is having her sisters over this weekend and hopes to reminisce about the good memories that they had with there mom.     Verification of patient location:    Patient is located at Home in the following state in which I hold an active license [place]      Assessment/Plan: f/u in 3 weeks    Problem List Items Addressed This Visit          Other    Depression with anxiety - Primary                Reason for visit is No chief complaint on file.       Encounter provider Oumou Lin    Provider located at [facility] [address] [place] [address] [address] [facility]  [address] [facility] [place] [address] [phone]      Recent Visits  No visits were found meeting these conditions.  Showing recent visits within past 7 days and meeting all other requirements  Future Appointments  No visits were found meeting these conditions.  Showing future appointments within next 150 days and meeting all other requirements       The patient was identified by name and date of birth. Aruna Quiros was informed that this is a telemedicine visit and that the visit is being conducted throughthe Epic Embedded platform. She agrees to proceed..  My office door was closed. No one else was in the room.  She acknowledged consent and understanding of privacy and security of the video platform. The patient has agreed to participate and " understands they can discontinue the visit at any time.    Patient is aware this is a billable service.     Subjective  Aruna Quiros is a 53 y.o. female  .      HPI     Past Medical History:   Diagnosis Date    Cancer (HCC)     Colon cancer (HCC) 2014    History of chemotherapy 2014    colon ca    History of radiation therapy 2014    colon ca    History of rectal cancer 2014    Locally advanced     History of tear of meniscus of knee joint     Hyperlipidemia     Tennis elbow        Past Surgical History:   Procedure Laterality Date    AUGMENTATION MAMMAPLASTY Bilateral 2005    BREAST SURGERY Bilateral     RECONSTRUCION WITH IMPLANT PROTHESIS     COLON SURGERY      COLONOSCOPY  04/2021    ELBOW SURGERY Left 2015    Tennis elbow    KNEE SURGERY      MENISCECTOMY Left 06/2016       Current Outpatient Medications   Medication Sig Dispense Refill    rosuvastatin (CRESTOR) 10 MG tablet TAKE 1 TABLET BY MOUTH EVERY DAY 90 tablet 2     No current facility-administered medications for this visit.        Allergies   Allergen Reactions    Iodinated Contrast Media        Review of Systems    Video Exam    There were no vitals filed for this visit.    Physical Exam Pt denied any SI/HI/Ah/VH

## 2024-02-10 PROBLEM — Z01.419 ENCOUNTER FOR ANNUAL ROUTINE GYNECOLOGICAL EXAMINATION: Status: RESOLVED | Noted: 2023-12-12 | Resolved: 2024-02-10

## 2024-02-21 ENCOUNTER — TELEMEDICINE (OUTPATIENT)
Dept: BEHAVIORAL/MENTAL HEALTH CLINIC | Facility: CLINIC | Age: 54
End: 2024-02-21

## 2024-02-21 DIAGNOSIS — F41.9 ANXIETY: Primary | ICD-10-CM

## 2024-02-23 NOTE — PSYCH
Visit start and stop times:    02/21/24  Start Time: 0930  Stop Time: 1015  Total Visit Time: 45 minutes  Virtual Regular Visit  Therapist met w/pt for individual session.  Pt shared that she continues to have an improved mood.  She stated her sister's did come over and it was enjoyable but there were a few times where she was triggered. Therapist and pt discussed how she coped with those triggers.  Therapist and pt then discussed pt how she has been helping her son and daughter in law out a lot and realizes that it is becoming too much.  Pt stated she knows she needs to set a boundary w/them but is struggling to do so. Therapist and pt discussed difficulty pt has with setting boundaries but how this experience can help her grow.  Discussion was held around ways to assert herself and communicate what she needs.   Verification of patient location:    Patient is located at Home in the following state in which I hold an active license [place]      Assessment/Plan:    Problem List Items Addressed This Visit          Other    Anxiety - Primary              Reason for visit is No chief complaint on file.       Encounter provider Oumou Lin    Provider located at [facility] [address] [address]  [address] [facility] [place] [address] [phone]      Recent Visits  Date Type Provider Dept   02/21/24 Telemedicine Oumou Lin  [facility] [address]    Showing recent visits within past 7 days and meeting all other requirements  Future Appointments  No visits were found meeting these conditions.  Showing future appointments within next 150 days and meeting all other requirements       The patient was identified by name and date of birth. Aruna Quiros was informed that this is a telemedicine visit and that the visit is being conducted throughthe Epic Embedded platform. She agrees to proceed..  My office door was closed. No  one else was in the room.  She acknowledged consent and understanding of privacy and security of the video platform. The patient has agreed to participate and understands they can discontinue the visit at any time.    Patient is aware this is a billable service.     Subjective  Aruna Quiros is a 53 y.o. female  .      HPI     Past Medical History:   Diagnosis Date    Cancer (HCC)     Colon cancer (HCC) 2014    History of chemotherapy 2014    colon ca    History of radiation therapy 2014    colon ca    History of rectal cancer 2014    Locally advanced     History of tear of meniscus of knee joint     Hyperlipidemia     Tennis elbow        Past Surgical History:   Procedure Laterality Date    AUGMENTATION MAMMAPLASTY Bilateral 2005    BREAST SURGERY Bilateral     RECONSTRUCION WITH IMPLANT PROTHESIS     COLON SURGERY      COLONOSCOPY  04/2021    ELBOW SURGERY Left 2015    Tennis elbow    KNEE SURGERY      MENISCECTOMY Left 06/2016       Current Outpatient Medications   Medication Sig Dispense Refill    rosuvastatin (CRESTOR) 10 MG tablet TAKE 1 TABLET BY MOUTH EVERY DAY 90 tablet 2     No current facility-administered medications for this visit.        Allergies   Allergen Reactions    Iodinated Contrast Media        Review of Systems    Video Exam    There were no vitals filed for this visit.    Physical Exam Pt denied any SI/HI/AH/VH

## 2024-03-19 ENCOUNTER — TELEMEDICINE (OUTPATIENT)
Dept: BEHAVIORAL/MENTAL HEALTH CLINIC | Facility: CLINIC | Age: 54
End: 2024-03-19
Payer: COMMERCIAL

## 2024-03-19 DIAGNOSIS — F41.8 DEPRESSION WITH ANXIETY: Primary | ICD-10-CM

## 2024-03-19 PROCEDURE — 90834 PSYTX W PT 45 MINUTES: CPT | Performed by: SOCIAL WORKER

## 2024-03-20 NOTE — PSYCH
Visit start and stop times:    03/19/24  Start Time: 0945  Stop Time: 1030  Total Visit Time: 45 minutes  Virtual Regular Visit  Therapist met w/pt for individual session.  Pt apologized for missing last session but stated she wasn't feeling well physically.  Pt stated that she wasn't feeling well for a few weeks and it also affected her mentally.  Pt stated that she didn't want to share how down she felt because it is still hard to be vulnerable in that way.  Therpaist continued to explore these feelings w/pt and ways pt is working on improving her overall mood.  She stated that she is feeling better physically which has helped.  She also stated that her granddaughter is coming to stay w/her and she is having her family over this weekend for an early easter dinner.  Pt was unable to do anything around herself due to not feeling well so she is focused on doing that today which also helps improve her overall mood.     Verification of patient location:    Patient is located at Home in the following state in which I hold an active license [place]      Assessment/Plan:    Problem List Items Addressed This Visit          Behavioral Health    Depression with anxiety - Primary            Reason for visit is No chief complaint on file.       Encounter provider Oumou Lin    Provider located at [facility] [address] [facility] [address]  [address]  [place] [address] [phone]      Recent Visits  Date Type Provider Dept   03/19/24 Telemedicine Oumou Lin  [facility] [address]   Showing recent visits within past 7 days and meeting all other requirements  Future Appointments  No visits were found meeting these conditions.  Showing future appointments within next 150 days and meeting all other requirements       The patient was identified by name and date of birth. Aruna Quiros was informed that this is a telemedicine  visit and that the visit is being conducted throughthe Epic Embedded platform. She agrees to proceed..  My office door was closed. No one else was in the room.  She acknowledged consent and understanding of privacy and security of the video platform. The patient has agreed to participate and understands they can discontinue the visit at any time.    Patient is aware this is a billable service.     Subjective  Aruna Quiros is a 53 y.o. female  .      HPI     Past Medical History:   Diagnosis Date    Cancer (HCC)     Colon cancer (HCC) 2014    History of chemotherapy 2014    colon ca    History of radiation therapy 2014    colon ca    History of rectal cancer 2014    Locally advanced     History of tear of meniscus of knee joint     Hyperlipidemia     Tennis elbow        Past Surgical History:   Procedure Laterality Date    AUGMENTATION MAMMAPLASTY Bilateral 2005    BREAST SURGERY Bilateral     RECONSTRUCION WITH IMPLANT PROTHESIS     COLON SURGERY      COLONOSCOPY  04/2021    ELBOW SURGERY Left 2015    Tennis elbow    KNEE SURGERY      MENISCECTOMY Left 06/2016       Current Outpatient Medications   Medication Sig Dispense Refill    rosuvastatin (CRESTOR) 10 MG tablet TAKE 1 TABLET BY MOUTH EVERY DAY 90 tablet 2     No current facility-administered medications for this visit.        Allergies   Allergen Reactions    Iodinated Contrast Media        Review of Systems    Video Exam    There were no vitals filed for this visit.    Physical Exam  Pt denied any SI/HI/Ah/VH

## 2024-04-03 ENCOUNTER — TELEMEDICINE (OUTPATIENT)
Dept: BEHAVIORAL/MENTAL HEALTH CLINIC | Facility: CLINIC | Age: 54
End: 2024-04-03
Payer: COMMERCIAL

## 2024-04-03 DIAGNOSIS — F41.8 DEPRESSION WITH ANXIETY: Primary | ICD-10-CM

## 2024-04-03 PROCEDURE — 90834 PSYTX W PT 45 MINUTES: CPT | Performed by: SOCIAL WORKER

## 2024-04-06 DIAGNOSIS — E78.5 HYPERLIPIDEMIA, UNSPECIFIED HYPERLIPIDEMIA TYPE: ICD-10-CM

## 2024-04-08 RX ORDER — ROSUVASTATIN CALCIUM 10 MG/1
TABLET, COATED ORAL
Qty: 90 TABLET | Refills: 2 | Status: SHIPPED | OUTPATIENT
Start: 2024-04-08

## 2024-04-08 NOTE — PSYCH
Visit start and stop times:    04/03/24  Start Time: 0900  Stop Time: 0945  Total Visit Time: 45 minutes  Virtual Regular Visit  Therapist met w/pt for individual session.  Symptoms: worry, racing thoughts. Pt stated she is doing better emotionally than she was last session.  Pt stated she got to spend time w/her son and his family which was very helpful.  She stated she has begun to look at jobs but is still unsure as to what she wants to do.  Therapist and pt discussed that she is getting closer to returning back to work but doesn't want to jump into anything.  Therapist and pt discussed ongoing strategies to help her continue to maintain healthy boundaries and assert herself when needed.  Pt gave a few examples of how she asserted herself w/her  rather than lashing out and was proud of how she handled herself.    Verification of patient location:    Patient is located at Home in the following state in which I hold an active license [place]      Assessment/Plan: f/u in 2-3 weeks    Problem List Items Addressed This Visit          Behavioral Health    Depression with anxiety - Primary                Reason for visit is No chief complaint on file.       Encounter provider Oumou Lin    Provider located at [facility] [address] [facility] [address]  [address] [facility] [place] [address] [phone]      Recent Visits  Date Type Provider Dept   04/03/24 Telemedicine Oumou Lin  [facility] [address]   Showing recent visits within past 7 days and meeting all other requirements  Future Appointments  No visits were found meeting these conditions.  Showing future appointments within next 150 days and meeting all other requirements       The patient was identified by name and date of birth. Aruna Quiros was informed that this is a telemedicine visit and that the visit is being conducted throughthe Epic Embedded  platform. She agrees to proceed..  My office door was closed. No one else was in the room.  She acknowledged consent and understanding of privacy and security of the video platform. The patient has agreed to participate and understands they can discontinue the visit at any time.    Patient is aware this is a billable service.     Subjective  Aruna Quiros is a 53 y.o. female  .      HPI     Past Medical History:   Diagnosis Date    Cancer (HCC)     Colon cancer (HCC) 2014    History of chemotherapy 2014    colon ca    History of radiation therapy 2014    colon ca    History of rectal cancer 2014    Locally advanced     History of tear of meniscus of knee joint     Hyperlipidemia     Tennis elbow        Past Surgical History:   Procedure Laterality Date    AUGMENTATION MAMMAPLASTY Bilateral 2005    BREAST SURGERY Bilateral     RECONSTRUCION WITH IMPLANT PROTHESIS     COLON SURGERY      COLONOSCOPY  04/2021    ELBOW SURGERY Left 2015    Tennis elbow    KNEE SURGERY      MENISCECTOMY Left 06/2016       Current Outpatient Medications   Medication Sig Dispense Refill    rosuvastatin (CRESTOR) 10 MG tablet TAKE 1 TABLET BY MOUTH EVERY DAY 90 tablet 2     No current facility-administered medications for this visit.        Allergies   Allergen Reactions    Iodinated Contrast Media        Review of Systems    Video Exam    There were no vitals filed for this visit.    Physical Exam Pt denied any SI/HI/Ah/VH

## 2024-04-19 ENCOUNTER — TELEMEDICINE (OUTPATIENT)
Dept: BEHAVIORAL/MENTAL HEALTH CLINIC | Facility: CLINIC | Age: 54
End: 2024-04-19
Payer: COMMERCIAL

## 2024-04-19 DIAGNOSIS — F41.8 DEPRESSION WITH ANXIETY: Primary | ICD-10-CM

## 2024-04-19 PROCEDURE — 90834 PSYTX W PT 45 MINUTES: CPT | Performed by: SOCIAL WORKER

## 2024-04-22 NOTE — PSYCH
Visit start and stop times:    04/19/24  Start Time: 0800  Stop Time: 0848  Total Visit Time: 48 minutes  Virtual Regular Visit  Therapist met w/pt for individual session.  Pt stated she is still sick so she plans on calling her PCP office to get seen.  She stated she continues to look for jobs and is still unsure as to what she wants to do.  Therapist and pt discussed importance of pt finding a sense of purpose again and what that could look like.  Pt expressed feeling as if she has good balance in her life and wanting to continue with that.  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  She stated that that has been a positive distraction as well.   Verification of patient location:    Patient is located at Home in the following state in which I hold an active license [place]      Assessment/Plan: f/u in 2 weeks    Problem List Items Addressed This Visit          Behavioral Health    Depression with anxiety - Primary            Reason for visit is No chief complaint on file.       Encounter provider Oumou Lin    Provider located at [facility] [address] [address]  [address] [facility] [place] [address]  [phone]      Recent Visits  Date Type Provider Dept   04/19/24 Telemedicine Oumou Lin [facility] [address]   Showing recent visits within past 7 days and meeting all other requirements  Future Appointments  No visits were found meeting these conditions.  Showing future appointments within next 150 days and meeting all other requirements       The patient was identified by name and date of birth. Aruna Quiros was informed that this is a telemedicine visit and that the visit is being conducted throughthe Epic Embedded platform. She agrees to proceed..  My office door was closed. No one else was in the room.  She  acknowledged consent and understanding of privacy and security of the video platform. The patient has agreed to participate and understands they can discontinue the visit at any time.    Patient is aware this is a billable service.     Subjective  Aruna Quiros is a 53 y.o. female  .      HPI     Past Medical History:   Diagnosis Date    Cancer (HCC)     Colon cancer (HCC) 2014    History of chemotherapy 2014    colon ca    History of radiation therapy 2014    colon ca    History of rectal cancer 2014    Locally advanced     History of tear of meniscus of knee joint     Hyperlipidemia     Tennis elbow        Past Surgical History:   Procedure Laterality Date    AUGMENTATION MAMMAPLASTY Bilateral 2005    BREAST SURGERY Bilateral     RECONSTRUCION WITH IMPLANT PROTHESIS     COLON SURGERY      COLONOSCOPY  04/2021    ELBOW SURGERY Left 2015    Tennis elbow    KNEE SURGERY      MENISCECTOMY Left 06/2016       Current Outpatient Medications   Medication Sig Dispense Refill    rosuvastatin (CRESTOR) 10 MG tablet TAKE 1 TABLET BY MOUTH EVERY DAY 90 tablet 2     No current facility-administered medications for this visit.        Allergies   Allergen Reactions    Iodinated Contrast Media        Review of Systems    Video Exam    There were no vitals filed for this visit.    Physical Exam Pt denied any SI/HI/AH/VH

## 2024-05-03 ENCOUNTER — TELEMEDICINE (OUTPATIENT)
Dept: BEHAVIORAL/MENTAL HEALTH CLINIC | Facility: CLINIC | Age: 54
End: 2024-05-03
Payer: COMMERCIAL

## 2024-05-03 DIAGNOSIS — F41.8 DEPRESSION WITH ANXIETY: Primary | ICD-10-CM

## 2024-05-03 PROCEDURE — 90834 PSYTX W PT 45 MINUTES: CPT | Performed by: SOCIAL WORKER

## 2024-05-06 NOTE — PSYCH
Visit start and stop times:    05/03/24  Start Time: 0830  Stop Time: 0915  Total Visit Time: 45 minutes  Virtual Regular Visit  Therapist met w/pt for individual session.  Pt stated that things have been going really well.  She stated that she has gotten in touch with a cousin of hers who lives locally.  Pt shared she feels less isolated and less bored.  Therapist and pt discussed other projects that pt is working on and how she feels as if she has a good balance in her life.  Therapist and pt continued to talk about pt's last job and pt wanting to work again but not sure when.  Therapist and pt discussed how happy pt is and the anxiety of working again.      Verification of patient location:    Patient is located at Home in the following state in which I hold an active license [place]      Assessment/Plan: f/u in 3 weeks    Problem List Items Addressed This Visit          Behavioral Health    Depression with anxiety - Primary            Reason for visit is No chief complaint on file.       Encounter provider Oumou Lin      Recent Visits  Date Type Provider Dept   05/03/24 Telemedicine Oumou Lin [facility] [address]   Showing recent visits within past 7 days and meeting all other requirements  Future Appointments  No visits were found meeting these conditions.  Showing future appointments within next 150 days and meeting all other requirements       The patient was identified by name and date of birth. Aruna Quiros was informed that this is a telemedicine visit and that the visit is being conducted throughthe Epic Embedded platform. She agrees to proceed..  My office door was closed. No one else was in the room.  She acknowledged consent and understanding of privacy and security of the video platform. The patient has agreed to participate and understands they can discontinue the visit at any time.    Patient is aware this is a billable service.     Subjective  Aruna Quiros is a 53 y.o.  female  .      HPI     Past Medical History:   Diagnosis Date    Cancer (HCC)     Colon cancer (HCC) 2014    History of chemotherapy 2014    colon ca    History of radiation therapy 2014    colon ca    History of rectal cancer 2014    Locally advanced     History of tear of meniscus of knee joint     Hyperlipidemia     Tennis elbow        Past Surgical History:   Procedure Laterality Date    AUGMENTATION MAMMAPLASTY Bilateral 2005    BREAST SURGERY Bilateral     RECONSTRUCION WITH IMPLANT PROTHESIS     COLON SURGERY      COLONOSCOPY  04/2021    ELBOW SURGERY Left 2015    Tennis elbow    KNEE SURGERY      MENISCECTOMY Left 06/2016       Current Outpatient Medications   Medication Sig Dispense Refill    rosuvastatin (CRESTOR) 10 MG tablet TAKE 1 TABLET BY MOUTH EVERY DAY 90 tablet 2     No current facility-administered medications for this visit.        Allergies   Allergen Reactions    Iodinated Contrast Media        Review of Systems    Video Exam    There were no vitals filed for this visit.    Physical Exam Pt denied any SI/HI/AH/VH

## 2024-05-08 ENCOUNTER — OFFICE VISIT (OUTPATIENT)
Dept: FAMILY MEDICINE CLINIC | Facility: CLINIC | Age: 54
End: 2024-05-08
Payer: COMMERCIAL

## 2024-05-08 VITALS
RESPIRATION RATE: 18 BRPM | HEART RATE: 97 BPM | SYSTOLIC BLOOD PRESSURE: 118 MMHG | BODY MASS INDEX: 26.98 KG/M2 | OXYGEN SATURATION: 98 % | WEIGHT: 146.6 LBS | TEMPERATURE: 99.8 F | HEIGHT: 62 IN | DIASTOLIC BLOOD PRESSURE: 78 MMHG

## 2024-05-08 DIAGNOSIS — J40 BRONCHITIS: Primary | ICD-10-CM

## 2024-05-08 DIAGNOSIS — J02.9 SORE THROAT: ICD-10-CM

## 2024-05-08 LAB — S PYO DNA THROAT QL NAA+PROBE: NOT DETECTED

## 2024-05-08 PROCEDURE — 99213 OFFICE O/P EST LOW 20 MIN: CPT | Performed by: NURSE PRACTITIONER

## 2024-05-08 PROCEDURE — 87651 STREP A DNA AMP PROBE: CPT | Performed by: NURSE PRACTITIONER

## 2024-05-08 RX ORDER — ALBUTEROL SULFATE 90 UG/1
2 AEROSOL, METERED RESPIRATORY (INHALATION) EVERY 6 HOURS PRN
Qty: 18 G | Refills: 0 | Status: SHIPPED | OUTPATIENT
Start: 2024-05-08

## 2024-05-08 RX ORDER — PREDNISONE 20 MG/1
20 TABLET ORAL 2 TIMES DAILY WITH MEALS
Qty: 10 TABLET | Refills: 0 | Status: SHIPPED | OUTPATIENT
Start: 2024-05-08 | End: 2024-05-13

## 2024-05-08 RX ORDER — DEXTROMETHORPHAN HYDROBROMIDE AND PROMETHAZINE HYDROCHLORIDE 15; 6.25 MG/5ML; MG/5ML
5 SYRUP ORAL 4 TIMES DAILY PRN
Qty: 473 ML | Refills: 0 | Status: SHIPPED | OUTPATIENT
Start: 2024-05-08

## 2024-05-08 RX ORDER — AZITHROMYCIN 250 MG/1
TABLET, FILM COATED ORAL
Qty: 6 TABLET | Refills: 0 | Status: SHIPPED | OUTPATIENT
Start: 2024-05-08 | End: 2024-05-12

## 2024-05-08 NOTE — PROGRESS NOTES
Name: Aruna Quiros      : 1970      MRN: [id number]  Encounter Provider: NITESH Beasley  Encounter Date: 2024   Encounter department: [facility] [address]    Assessment & Plan     1. Bronchitis  Comments:  Increase hydration,rest, good nutrition, steam, humidified/moist air, avoid irritants if possible. Medications as prescibed.  Orders:  -     azithromycin (ZITHROMAX) 250 mg tablet; Take 2 tablets today then 1 tablet daily x 4 days  -     predniSONE 20 mg tablet; Take 1 tablet (20 mg total) by mouth 2 (two) times a day with meals for 5 days  -     albuterol (ProAir HFA) 90 mcg/act inhaler; Inhale 2 puffs every 6 (six) hours as needed for wheezing  -     promethazine-dextromethorphan (PHENERGAN-DM) 6.25-15 mg/5 mL oral syrup; Take 5 mL by mouth 4 (four) times a day as needed for cough    2. Sore throat  Comments:  POCT Strep negative. Will treat bronchitis and PND. Advised increased hydration, soft foods, voice rest, warm liquids.  Orders:  -     POCT rapid PCR strepA           Subjective      Patient presents to the office for evaluation of sore throat and cough  Symptoms began 2 days ago with congestion.  Has since developed postnasal drip, sore throat, moist cough.  Cough is worse at night.  Denies production of sputum or hemoptysis.  Notes chest tightness and wheezing.  Recent sick contacts.  Denies fever, chills, difficulty breathing, body aches.      Review of Systems   Constitutional:  Negative for activity change, appetite change, chills, fatigue and fever.   HENT:  Positive for congestion, postnasal drip and sore throat. Negative for ear pain, sinus pressure, sinus pain, trouble swallowing and voice change.    Eyes:  Negative for photophobia and visual disturbance.   Respiratory:  Positive for cough, chest tightness and wheezing. Negative for shortness of breath.    Cardiovascular:  Negative for chest pain and palpitations.   Gastrointestinal:   "Negative for nausea and vomiting.   Genitourinary:  Negative for decreased urine volume.   Musculoskeletal:  Negative for arthralgias, myalgias and neck pain.   Skin:  Negative for color change and rash.   Neurological:  Negative for dizziness, weakness, light-headedness and headaches.   Hematological:  Negative for adenopathy.   Psychiatric/Behavioral:  Negative for confusion.        Current Outpatient Medications on File Prior to Visit   Medication Sig    rosuvastatin (CRESTOR) 10 MG tablet TAKE 1 TABLET BY MOUTH EVERY DAY       Objective     /78 (BP Location: Left arm, Patient Position: Sitting)   Pulse 97   Temp 99.8 °F (37.7 °C)   Resp 18   Ht 5' 2.25\" (1.581 m)   Wt 66.5 kg (146 lb 9.6 oz)   LMP 07/01/2014 (Within Days) Comment: Medical induced menopsuse   SpO2 98%   BMI 26.60 kg/m²     Physical Exam  Vitals reviewed.   Constitutional:       General: She is not in acute distress.     Appearance: She is well-developed. She is not ill-appearing.   HENT:      Head: Normocephalic and atraumatic.      Right Ear: Tympanic membrane and ear canal normal. No middle ear effusion. Tympanic membrane is not erythematous.      Left Ear: Tympanic membrane and ear canal normal.  No middle ear effusion. Tympanic membrane is not erythematous.      Nose: Congestion present.      Mouth/Throat:      Mouth: Mucous membranes are moist.      Pharynx: Oropharynx is clear. Uvula midline. Posterior oropharyngeal erythema present. No oropharyngeal exudate.      Tonsils: No tonsillar exudate or tonsillar abscesses. 1+ on the right. 1+ on the left.   Eyes:      Conjunctiva/sclera: Conjunctivae normal.      Pupils: Pupils are equal, round, and reactive to light.   Cardiovascular:      Rate and Rhythm: Normal rate and regular rhythm.      Heart sounds: Normal heart sounds. No murmur heard.  Pulmonary:      Effort: Pulmonary effort is normal.      Breath sounds: Examination of the right-upper field reveals rhonchi. Examination " of the left-upper field reveals rhonchi. Examination of the right-middle field reveals rhonchi. Examination of the left-middle field reveals rhonchi. Examination of the right-lower field reveals rhonchi. Examination of the left-lower field reveals rhonchi. Rhonchi present.   Abdominal:      General: Bowel sounds are normal.      Palpations: Abdomen is soft.   Musculoskeletal:      Cervical back: Normal range of motion and neck supple.   Lymphadenopathy:      Cervical: No cervical adenopathy.   Skin:     General: Skin is warm and dry.      Capillary Refill: Capillary refill takes less than 2 seconds.   Neurological:      General: No focal deficit present.      Mental Status: She is alert and oriented to person, place, and time.   Psychiatric:         Mood and Affect: Mood normal.         Behavior: Behavior normal.       NITESH Beasley

## 2024-06-07 ENCOUNTER — TELEMEDICINE (OUTPATIENT)
Dept: BEHAVIORAL/MENTAL HEALTH CLINIC | Facility: CLINIC | Age: 54
End: 2024-06-07
Payer: COMMERCIAL

## 2024-06-07 DIAGNOSIS — F41.8 DEPRESSION WITH ANXIETY: Primary | ICD-10-CM

## 2024-06-07 PROCEDURE — 90834 PSYTX W PT 45 MINUTES: CPT | Performed by: SOCIAL WORKER

## 2024-06-10 NOTE — BH TREATMENT PLAN
"Outpatient Behavioral Health Psychotherapy Treatment Plan    Aruna Quiros  1970     Date of Initial Psychotherapy Assessment: 6/7/2024  Date of Current Treatment Plan: 06/7/24  Treatment Plan Target Date: 12/7/24  Treatment Plan Expiration Date: TBD    Diagnosis:   1. Depression with anxiety            Area(s) of Need: coping skills    Long Term Goal 1 (in the client's own words): \"I want to maintain this sense of calmness.\"    Stage of Change: Action    Target Date for completion: TBD     Anticipated therapeutic modalities: CBT, DBT     People identified to complete this goal: PT      Objective 1: (identify the means of measuring success in meeting the objective): Pt will learn and implement  2 or more calming skills to reduce overall anxiety and manage anxiety symptoms.      Objective 2: (identify the means of measuring success in meeting the objective): Pt will engage in self care at least 1x/day.          I am currently under the care of a [facility] psychiatric provider: no    My [facility] psychiatric provider is: n/a    I am currently taking psychiatric medications: No    I feel that I will be ready for discharge from mental health care when I reach the following (measurable goal/objective): I am able to manage my stress/anxiety 75% of the time.    For children and adults who have a legal guardian:   Has there been any change to custody orders and/or guardianship status? NA. If yes, attach updated documentation.    I have created my Crisis Plan and have been offered a copy of this plan    Behavioral Health Treatment Plan [facility]: Diagnosis and Treatment Plan explained to Aruna Ishaan Aruna Quiros acknowledges an understanding of their diagnosis. Aruna Quiros agrees to this treatment plan.    I have been offered a copy of this Treatment Plan. Yes    Aruna Quiros, 1970, actively participated in the creation of this treatment plan during a virtual session, using the Epic Embedded platform.   Aruna Quiros  " provided verbal consent on 6/7/2024 at 10:15AM. The treatment plan was transcribed into the Electronic Health Record at a later time.

## 2024-06-10 NOTE — PSYCH
"    Visit start and stop times:    06/7/24  Start Time: 0950  Stop Time: 1030  Total Visit Time: 40 minutes  Virtual Regular Visit  Therapist met w/pt for individual session.  Therapist and pt completed crisis plan and tx plan.  Pt expressed overall feeling as if she is in a good place.  She ended up going to her PCP due to not feeling well and has been antibiotics which has really helped.  She went to a wedding last weekend and had a good time.  She stated that she enjoyed the time w/her family and feels \"content.\"    Verification of patient location:    Patient is located at Home in the following state in which I hold an active license [place]      Assessment/Plan: f/u in 3 weeks    Problem List Items Addressed This Visit          Behavioral Health    Depression with anxiety - Primary              Reason for visit is No chief complaint on file.       Encounter provider Oumou Lin      Recent Visits  Date Type Provider Dept   06/07/24 Telemedicine Oumou Lin [facility] [address]   Showing recent visits within past 7 days and meeting all other requirements  Future Appointments  No visits were found meeting these conditions.  Showing future appointments within next 150 days and meeting all other requirements       The patient was identified by name and date of birth. Aruna Quiros was informed that this is a telemedicine visit and that the visit is being conducted throughthe Epic Embedded platform. She agrees to proceed..  My office door was closed. No one else was in the room.  She acknowledged consent and understanding of privacy and security of the video platform. The patient has agreed to participate and understands they can discontinue the visit at any time.    Patient is aware this is a billable service.     Subjective  Aruna Quiros is a 53 y.o. female  .      HPI     Past Medical History:   Diagnosis Date    Cancer (HCC)     Colon cancer (HCC) 2014    History of chemotherapy 2014    colon " ca    History of radiation therapy 2014    colon ca    History of rectal cancer 2014    Locally advanced     History of tear of meniscus of knee joint     Hyperlipidemia     Tennis elbow        Past Surgical History:   Procedure Laterality Date    AUGMENTATION MAMMAPLASTY Bilateral 2005    BREAST SURGERY Bilateral     RECONSTRUCION WITH IMPLANT PROTHESIS     COLON SURGERY      COLONOSCOPY  04/2021    ELBOW SURGERY Left 2015    Tennis elbow    KNEE SURGERY      MENISCECTOMY Left 06/2016       Current Outpatient Medications   Medication Sig Dispense Refill    albuterol (ProAir HFA) 90 mcg/act inhaler Inhale 2 puffs every 6 (six) hours as needed for wheezing 18 g 0    promethazine-dextromethorphan (PHENERGAN-DM) 6.25-15 mg/5 mL oral syrup Take 5 mL by mouth 4 (four) times a day as needed for cough 473 mL 0    rosuvastatin (CRESTOR) 10 MG tablet TAKE 1 TABLET BY MOUTH EVERY DAY 90 tablet 2     No current facility-administered medications for this visit.        Allergies   Allergen Reactions    Iodinated Contrast Media        Review of Systems    Video Exam    There were no vitals filed for this visit.    Physical Exam Pt denied any SI/HI/Ah/VH

## 2024-06-28 ENCOUNTER — TELEMEDICINE (OUTPATIENT)
Dept: BEHAVIORAL/MENTAL HEALTH CLINIC | Facility: CLINIC | Age: 54
End: 2024-06-28
Payer: COMMERCIAL

## 2024-06-28 DIAGNOSIS — F41.8 DEPRESSION WITH ANXIETY: Primary | ICD-10-CM

## 2024-06-28 PROCEDURE — 90834 PSYTX W PT 45 MINUTES: CPT | Performed by: SOCIAL WORKER

## 2024-07-01 NOTE — PSYCH
"Behavioral Health Psychotherapy Progress Note    Psychotherapy Provided: Individual Psychotherapy     1. Depression with anxiety              Visit start and stop times:    06/28/24  Start Time: 0900  Stop Time: 0945  Total Visit Time: 45 minutes  Virtual Regular Visit  Therapist met w/pt for individual session.  Pt stated things continue to go well for her.  She identified one example where she would have \"broke down\" emotionally but instead felt confident when talking to an old friend.  Therapist and pt continued to talk about pt leaving her job last year and how initially she felt it was \"stupid\" to leave such a high paying job she now realizes that it was one of the smartest decisions she has made.  She continues to enjoy the down time and time w/family.  She stated she has brief moments of guilt but that that feeling passes a lot quicker and she is enjoying life.  She identified a few stressors but has been able to manage them effectively rather than lashing out at others.      Verification of patient location:    Patient is located at Home in the following state in which I hold an active license [place]      Assessment/Plan: f/u in 3 weeks    Problem List Items Addressed This Visit       Depression with anxiety - Primary            Reason for visit is No chief complaint on file.       Encounter provider Oumou Lin      Recent Visits  Date Type Provider Dept   06/28/24 Telemedicine Oumou Lin [facility] [address]   Showing recent visits within past 7 days and meeting all other requirements  Future Appointments  No visits were found meeting these conditions.  Showing future appointments within next 150 days and meeting all other requirements       The patient was identified by name and date of birth. Aruna Quiros was informed that this is a telemedicine visit and that the visit is being conducted throughthe Epic Embedded platform. She agrees to proceed..  My office door was closed. No one " else was in the room.  She acknowledged consent and understanding of privacy and security of the video platform. The patient has agreed to participate and understands they can discontinue the visit at any time.    Patient is aware this is a billable service.     Subjective  Aruna Quiros is a 53 y.o. female  .      HPI     Past Medical History:   Diagnosis Date    Cancer (HCC)     Colon cancer (HCC) 2014    History of chemotherapy 2014    colon ca    History of radiation therapy 2014    colon ca    History of rectal cancer 2014    Locally advanced     History of tear of meniscus of knee joint     Hyperlipidemia     Tennis elbow        Past Surgical History:   Procedure Laterality Date    AUGMENTATION MAMMAPLASTY Bilateral 2005    BREAST SURGERY Bilateral     RECONSTRUCION WITH IMPLANT PROTHESIS     COLON SURGERY      COLONOSCOPY  04/2021    ELBOW SURGERY Left 2015    Tennis elbow    KNEE SURGERY      MENISCECTOMY Left 06/2016       Current Outpatient Medications   Medication Sig Dispense Refill    albuterol (ProAir HFA) 90 mcg/act inhaler Inhale 2 puffs every 6 (six) hours as needed for wheezing 18 g 0    promethazine-dextromethorphan (PHENERGAN-DM) 6.25-15 mg/5 mL oral syrup Take 5 mL by mouth 4 (four) times a day as needed for cough 473 mL 0    rosuvastatin (CRESTOR) 10 MG tablet TAKE 1 TABLET BY MOUTH EVERY DAY 90 tablet 2     No current facility-administered medications for this visit.        Allergies   Allergen Reactions    Iodinated Contrast Media        Review of Systems    Video Exam    There were no vitals filed for this visit.    Physical Exam Pt denied any SI/HI/AH/Vh

## 2024-07-15 ENCOUNTER — HOSPITAL ENCOUNTER (OUTPATIENT)
Dept: ULTRASOUND IMAGING | Facility: CLINIC | Age: 54
Discharge: HOME | End: 2024-07-15
Payer: COMMERCIAL

## 2024-07-15 DIAGNOSIS — Z80.3 FAMILY HISTORY OF BREAST CANCER IN FIRST DEGREE RELATIVE: ICD-10-CM

## 2024-07-15 PROCEDURE — 76641 ULTRASOUND BREAST COMPLETE: CPT

## 2024-07-23 ENCOUNTER — TELEMEDICINE (OUTPATIENT)
Dept: BEHAVIORAL/MENTAL HEALTH CLINIC | Facility: CLINIC | Age: 54
End: 2024-07-23
Payer: COMMERCIAL

## 2024-07-23 DIAGNOSIS — F41.8 DEPRESSION WITH ANXIETY: Primary | ICD-10-CM

## 2024-07-23 PROCEDURE — 90834 PSYTX W PT 45 MINUTES: CPT | Performed by: SOCIAL WORKER

## 2024-07-24 NOTE — PSYCH
Behavioral Health Psychotherapy Progress Note    Psychotherapy Provided: Individual Psychotherapy     1. Depression with anxiety              Visit start and stop times:    07/23/24  Start Time: 1115  Stop Time: 1200  Total Visit Time: 45 minutes  Virtual Regular Visit  Therapist met w/pt for individual session.  Pt shared that she has been struggling since 4th of July.  She got into an argument with her son and he said some things that have been really hurtful.  Therapist and pt processed her feelings and how it triggered a lot of her past.  She stated that he got loud and she immediately went into fight or flight mode.  Therapist and pt discussed that her reaction improved compared to a few years ago.  therapist and pt discussed healthy ways to navigate these uncomfortable feelings.      Verification of patient location:    Patient is located at Home in the following state in which I hold an active license [place]      Assessment/Plan: f/u in three weeks    Problem List Items Addressed This Visit       Depression with anxiety - Primary            Reason for visit is No chief complaint on file.       Encounter provider Oumou Lin      Recent Visits  No visits were found meeting these conditions.  Showing recent visits within past 7 days and meeting all other requirements  Today's Visits  Date Type Provider Dept   07/23/24 Telemedicine Oumou Lin [facility] [address]   Showing today's visits and meeting all other requirements  Future Appointments  No visits were found meeting these conditions.  Showing future appointments within next 150 days and meeting all other requirements       The patient was identified by name and date of birth. Aruna Quiros was informed that this is a telemedicine visit and that the visit is being conducted throughthe Epic Embedded platform. She agrees to proceed..  My office door was closed. No one else was in the room.  She acknowledged consent and understanding of  privacy and security of the video platform. The patient has agreed to participate and understands they can discontinue the visit at any time.    Patient is aware this is a billable service.     Subjective  Aruna Quiros is a 53 y.o. female  .      HPI     Past Medical History:   Diagnosis Date    Cancer (HCC)     Colon cancer (HCC) 2014    History of chemotherapy 2014    colon ca    History of radiation therapy 2014    colon ca    History of rectal cancer 2014    Locally advanced     History of tear of meniscus of knee joint     Hyperlipidemia     Tennis elbow        Past Surgical History:   Procedure Laterality Date    AUGMENTATION MAMMAPLASTY Bilateral 2005    BREAST SURGERY Bilateral     RECONSTRUCION WITH IMPLANT PROTHESIS     COLON SURGERY      COLONOSCOPY  04/2021    ELBOW SURGERY Left 2015    Tennis elbow    KNEE SURGERY      MENISCECTOMY Left 06/2016       Current Outpatient Medications   Medication Sig Dispense Refill    albuterol (ProAir HFA) 90 mcg/act inhaler Inhale 2 puffs every 6 (six) hours as needed for wheezing 18 g 0    promethazine-dextromethorphan (PHENERGAN-DM) 6.25-15 mg/5 mL oral syrup Take 5 mL by mouth 4 (four) times a day as needed for cough 473 mL 0    rosuvastatin (CRESTOR) 10 MG tablet TAKE 1 TABLET BY MOUTH EVERY DAY 90 tablet 2     No current facility-administered medications for this visit.        Allergies   Allergen Reactions    Iodinated Contrast Media        Review of Systems    Video Exam    There were no vitals filed for this visit.    Physical Exam Pt denied any SI/HI/Ah/VH

## 2024-09-10 ENCOUNTER — TELEMEDICINE (OUTPATIENT)
Dept: BEHAVIORAL/MENTAL HEALTH CLINIC | Facility: CLINIC | Age: 54
End: 2024-09-10
Payer: COMMERCIAL

## 2024-09-10 DIAGNOSIS — F41.8 DEPRESSION WITH ANXIETY: Primary | ICD-10-CM

## 2024-09-10 PROCEDURE — 90834 PSYTX W PT 45 MINUTES: CPT | Performed by: SOCIAL WORKER

## 2024-09-11 NOTE — PSYCH
"Behavioral Health Psychotherapy Progress Note    Psychotherapy Provided: Individual Psychotherapy     1. Depression with anxiety              Visit start and stop times:    09/10/24  Start Time: 0800  Stop Time: 0850  Total Visit Time: 50 minutes  Virtual Regular Visit  Therapist met w/pt for individual session.  Pt apologized for not reaching out sooner.  She has been busy with kitchen renovations.  She stated that she still hasn't addressed the confrontation w/her son a few months ago but is feeling a little bit better about it.  Therapist and pt processed it further and pt stated that she hasn't fully let it go but may bring it up depending on timing.  She expressed being proud of herself for handling her emotions well during the renovations.  She stated she couldn't identify any times where she felt \"out of control\" emotionally.  She stated she feels as if she has a good balance and is now getting ready for her vacation at the end of the month w/her family.  She identified having some moments of sadness and thinking about her previous job but continues to not have any regrets.    Verification of patient location:    Patient is located at Home in the following state in which I hold an active license [place]      Assessment/Plan:    Problem List Items Addressed This Visit       Depression with anxiety - Primary            Reason for visit is No chief complaint on file.       Encounter provider Oumou Lin      Recent Visits  Date Type Provider Dept   09/10/24 Telemedicine Oumou Lin [facility] [address]   Showing recent visits within past 7 days and meeting all other requirements  Future Appointments  No visits were found meeting these conditions.  Showing future appointments within next 150 days and meeting all other requirements       The patient was identified by name and date of birth. Aruna Quiros was informed that this is a telemedicine visit and that the visit is being conducted " throughthe Epic Embedded platform. She agrees to proceed..  My office door was closed. No one else was in the room.  She acknowledged consent and understanding of privacy and security of the video platform. The patient has agreed to participate and understands they can discontinue the visit at any time.    Patient is aware this is a billable service.     Raul Quiros is a 53 y.o. female  .      HPI     Past Medical History:   Diagnosis Date    Cancer (HCC)     Colon cancer (HCC) 2014    History of chemotherapy 2014    colon ca    History of radiation therapy 2014    colon ca    History of rectal cancer 2014    Locally advanced     History of tear of meniscus of knee joint     Hyperlipidemia     Tennis elbow        Past Surgical History:   Procedure Laterality Date    AUGMENTATION MAMMAPLASTY Bilateral 2005    BREAST SURGERY Bilateral     RECONSTRUCION WITH IMPLANT PROTHESIS     COLON SURGERY      COLONOSCOPY  04/2021    ELBOW SURGERY Left 2015    Tennis elbow    KNEE SURGERY      MENISCECTOMY Left 06/2016       Current Outpatient Medications   Medication Sig Dispense Refill    albuterol (ProAir HFA) 90 mcg/act inhaler Inhale 2 puffs every 6 (six) hours as needed for wheezing 18 g 0    promethazine-dextromethorphan (PHENERGAN-DM) 6.25-15 mg/5 mL oral syrup Take 5 mL by mouth 4 (four) times a day as needed for cough 473 mL 0    rosuvastatin (CRESTOR) 10 MG tablet TAKE 1 TABLET BY MOUTH EVERY DAY 90 tablet 2     No current facility-administered medications for this visit.        Allergies   Allergen Reactions    Iodinated Contrast Media        Review of Systems    Video Exam    There were no vitals filed for this visit.    Physical ExamPt denied any SI/HI/AH/VH

## 2024-09-27 ENCOUNTER — OFFICE VISIT (OUTPATIENT)
Dept: FAMILY MEDICINE CLINIC | Facility: CLINIC | Age: 54
End: 2024-09-27
Payer: COMMERCIAL

## 2024-09-27 VITALS
HEIGHT: 62 IN | SYSTOLIC BLOOD PRESSURE: 116 MMHG | WEIGHT: 145.4 LBS | HEART RATE: 90 BPM | DIASTOLIC BLOOD PRESSURE: 64 MMHG | OXYGEN SATURATION: 96 % | BODY MASS INDEX: 26.76 KG/M2

## 2024-09-27 DIAGNOSIS — E55.9 VITAMIN D DEFICIENCY: ICD-10-CM

## 2024-09-27 DIAGNOSIS — Z12.83 SCREENING EXAM FOR SKIN CANCER: ICD-10-CM

## 2024-09-27 DIAGNOSIS — Z00.00 ANNUAL PHYSICAL EXAM: Primary | ICD-10-CM

## 2024-09-27 DIAGNOSIS — Z00.00 HEALTHCARE MAINTENANCE: ICD-10-CM

## 2024-09-27 DIAGNOSIS — C18.9 MALIGNANT NEOPLASM OF COLON, UNSPECIFIED PART OF COLON (HCC): ICD-10-CM

## 2024-09-27 DIAGNOSIS — E78.5 HYPERLIPIDEMIA, UNSPECIFIED HYPERLIPIDEMIA TYPE: ICD-10-CM

## 2024-09-27 PROBLEM — N18.31 STAGE 3A CHRONIC KIDNEY DISEASE (HCC): Status: RESOLVED | Noted: 2020-10-06 | Resolved: 2024-09-27

## 2024-09-27 PROBLEM — M24.562: Status: RESOLVED | Noted: 2022-08-08 | Resolved: 2024-09-27

## 2024-09-27 PROBLEM — R63.4 WEIGHT LOSS: Status: RESOLVED | Noted: 2022-07-26 | Resolved: 2024-09-27

## 2024-09-27 PROBLEM — F41.8 DEPRESSION WITH ANXIETY: Status: RESOLVED | Noted: 2020-08-03 | Resolved: 2024-09-27

## 2024-09-27 PROBLEM — F41.9 ANXIETY: Status: RESOLVED | Noted: 2019-08-13 | Resolved: 2024-09-27

## 2024-09-27 PROBLEM — M79.605 LOWER EXTREMITY PAIN, LEFT: Status: RESOLVED | Noted: 2018-12-12 | Resolved: 2024-09-27

## 2024-09-27 PROBLEM — E28.319 EARLY MENOPAUSE OCCURRING IN PATIENT AGE YOUNGER THAN 45 YEARS: Status: RESOLVED | Noted: 2017-05-02 | Resolved: 2024-09-27

## 2024-09-27 PROBLEM — R19.7 DIARRHEA: Status: RESOLVED | Noted: 2022-02-22 | Resolved: 2024-09-27

## 2024-09-27 PROCEDURE — 99396 PREV VISIT EST AGE 40-64: CPT | Performed by: NURSE PRACTITIONER

## 2024-09-27 NOTE — ASSESSMENT & PLAN NOTE
Treated in 2014 with chemo and radiation at [facility].  She is due for colonoscopy and she would like to do this locally.  Orders:    Ambulatory Referral to Gastroenterology; Future

## 2024-09-27 NOTE — PROGRESS NOTES
Adult Annual Physical  Name: Aruna Quiros      : 1970      MRN: [id number]  Encounter Provider: NITESH Jaurez  Encounter Date: 2024   Encounter department: [facility] [address]    Assessment & Plan  Malignant neoplasm of colon, unspecified part of colon (HCC)  Treated in  with chemo and radiation at [facility].  She is due for colonoscopy and she would like to do this locally.  Orders:    Ambulatory Referral to Gastroenterology; Future    Vitamin D deficiency    Orders:    Vitamin D 25 hydroxy; Future    Healthcare maintenance    Orders:    CBC and differential; Future    Comprehensive metabolic panel; Future    Lipid panel; Future    TSH, 3rd generation with Free T4 reflex; Future    Vitamin D 25 hydroxy; Future    Screening exam for skin cancer    Orders:    Ambulatory Referral to Dermatology; Future    Hyperlipidemia, unspecified hyperlipidemia type  Will obtain current fasting labs.  Continue on rosuvastatin 10 mg daily.       Annual physical exam         Immunizations and preventive care screenings were discussed with patient today. Appropriate education was printed on patient's after visit summary.    Counseling:  Exercise: the importance of regular exercise/physical activity was discussed. Recommend exercise 3-5 times per week for at least 30 minutes.          History of Present Illness     Adult Annual Physical:  Patient presents for annual physical. Patient presents for annual physical.  She is feeling well and has no concerns for today.  She does have a history of colon cancer and was treated in  with chemo and radiation at [facility].  Patient did have her last colonoscopy in 2021.  She is due for a colonoscopy and would like to do this locally..     Diet and Physical Activity:  - Diet/Nutrition: poor diet.  - Exercise: walking.    General Health:  - Sleep: sleeps well.  - Hearing: normal hearing bilateral ears.  - Vision:  "wears glasses and goes for regular eye exams.  - Dental: regular dental visits.    /GYN Health:  - Follows with GYN: yes.   - Menopause: postmenopausal.   - History of STDs: no    Review of Systems   Constitutional:  Negative for chills, diaphoresis and fever.   HENT:  Negative for ear pain and sore throat.    Eyes:  Negative for pain and visual disturbance.   Respiratory:  Negative for cough and shortness of breath.    Cardiovascular:  Negative for chest pain and palpitations.   Gastrointestinal:  Negative for abdominal pain and vomiting.   Genitourinary:  Negative for dysuria and hematuria.   Musculoskeletal:  Negative for arthralgias and back pain.   Skin:  Negative for color change and rash.   Neurological:  Negative for seizures and syncope.   Psychiatric/Behavioral:  Negative for dysphoric mood and sleep disturbance. The patient is not nervous/anxious.    All other systems reviewed and are negative.    Current Outpatient Medications on File Prior to Visit   Medication Sig Dispense Refill    rosuvastatin (CRESTOR) 10 MG tablet TAKE 1 TABLET BY MOUTH EVERY DAY 90 tablet 2    [DISCONTINUED] albuterol (ProAir HFA) 90 mcg/act inhaler Inhale 2 puffs every 6 (six) hours as needed for wheezing 18 g 0    [DISCONTINUED] promethazine-dextromethorphan (PHENERGAN-DM) 6.25-15 mg/5 mL oral syrup Take 5 mL by mouth 4 (four) times a day as needed for cough 473 mL 0     No current facility-administered medications on file prior to visit.        Objective     /64   Pulse 90   Ht 5' 2.25\" (1.581 m)   Wt 66 kg (145 lb 6.4 oz)   LMP 07/01/2014 (Within Days) Comment: Medical induced menopsuse   SpO2 96%   BMI 26.38 kg/m²     Physical Exam  Vitals and nursing note reviewed.   Constitutional:       General: She is not in acute distress.     Appearance: She is well-developed.   HENT:      Head: Normocephalic and atraumatic.      Right Ear: Tympanic membrane normal.      Left Ear: Tympanic membrane normal.      Nose: No " congestion.      Mouth/Throat:      Pharynx: No oropharyngeal exudate.   Eyes:      Conjunctiva/sclera: Conjunctivae normal.   Cardiovascular:      Rate and Rhythm: Normal rate and regular rhythm.      Heart sounds: No murmur heard.  Pulmonary:      Effort: Pulmonary effort is normal. No respiratory distress.      Breath sounds: Normal breath sounds.   Abdominal:      Palpations: Abdomen is soft.      Tenderness: There is no abdominal tenderness.   Musculoskeletal:         General: No swelling.      Cervical back: Neck supple.   Skin:     General: Skin is warm and dry.      Capillary Refill: Capillary refill takes less than 2 seconds.   Neurological:      Mental Status: She is alert and oriented to person, place, and time.   Psychiatric:         Mood and Affect: Mood normal.       Administrative Statements   I have spent a total time of 20 minutes in caring for this patient on the day of the visit/encounter including Counseling / Coordination of care, Documenting in the medical record, Reviewing / ordering tests, medicine, procedures  , and Obtaining or reviewing history  .

## 2024-10-08 ENCOUNTER — TELEMEDICINE (OUTPATIENT)
Dept: BEHAVIORAL/MENTAL HEALTH CLINIC | Facility: CLINIC | Age: 54
End: 2024-10-08
Payer: COMMERCIAL

## 2024-10-08 DIAGNOSIS — F32.A DEPRESSION, UNSPECIFIED DEPRESSION TYPE: Primary | ICD-10-CM

## 2024-10-08 PROCEDURE — 90834 PSYTX W PT 45 MINUTES: CPT | Performed by: SOCIAL WORKER

## 2024-10-08 NOTE — PSYCH
Behavioral Health Psychotherapy Progress Note    Psychotherapy Provided: Individual Psychotherapy     1. Depression, unspecified depression type              Visit start and stop times:    10/08/24  Start Time: 0800  Stop Time: 0850  Total Visit Time: 50 minutes  Virtual Regular Visit  Therapist met w/pt for individual session.  Pt shared that she had a great vacation w/her loved ones.  She shared that she was able to be present and engaged w/her family which she is very grateful for.  Therapist and pt continued to review progress pt has made emotionally and the importance of self care.  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  Last week was her mother's birthday and the first one since she passed.  Discussion was held around pt's grief process and how her grief process has looked different than her sisters.   Verification of patient location:    Patient is located at Home in the following state in which I hold an active license [place]      Assessment/Plan: f/u in3-4 weeks    Problem List Items Addressed This Visit    None  Visit Diagnoses       Depression, unspecified depression type    -  Primary                     Reason for visit is No chief complaint on file.       Encounter provider Oumou Lin      Recent Visits  No visits were found meeting these conditions.  Showing recent visits within past 7 days and meeting all other requirements  Today's Visits  Date Type Provider Dept   10/08/24 Telemedicine Oumou Lin [facility] [address]   Showing today's visits and meeting all other requirements  Future Appointments  No visits were found meeting these conditions.  Showing future appointments within next 150 days and meeting all other requirements       The patient was identified by name and date of birth. Aruna Quiros was informed that this is a telemedicine visit and that the visit is being conducted  throughthe Epic Embedded platform. She agrees to proceed..  My office door was closed. No one else was in the room.  She acknowledged consent and understanding of privacy and security of the video platform. The patient has agreed to participate and understands they can discontinue the visit at any time.    Patient is aware this is a billable service.     Subjective  Aruna Quiros is a 53 y.o. female  .      HPI     Past Medical History:   Diagnosis Date    Cancer (HCC)     Colon cancer (HCC) 2014    History of chemotherapy 2014    colon ca    History of radiation therapy 2014    colon ca    History of rectal cancer 2014    Locally advanced     History of tear of meniscus of knee joint     Hyperlipidemia     Tennis elbow        Past Surgical History:   Procedure Laterality Date    AUGMENTATION MAMMAPLASTY Bilateral 2005    BREAST SURGERY Bilateral     RECONSTRUCION WITH IMPLANT PROTHESIS     COLON SURGERY      COLONOSCOPY  04/2021    ELBOW SURGERY Left 2015    Tennis elbow    KNEE SURGERY      MENISCECTOMY Left 06/2016       Current Outpatient Medications   Medication Sig Dispense Refill    rosuvastatin (CRESTOR) 10 MG tablet TAKE 1 TABLET BY MOUTH EVERY DAY 90 tablet 2     No current facility-administered medications for this visit.        Allergies   Allergen Reactions    Iodinated Contrast Media        Review of Systems    Video Exam    There were no vitals filed for this visit.    Physical Exam Pt denied any SI/HI/AH/VH

## 2024-10-29 ENCOUNTER — TELEPHONE (OUTPATIENT)
Age: 54
End: 2024-10-29

## 2024-10-29 ENCOUNTER — PREP FOR PROCEDURE (OUTPATIENT)
Age: 54
End: 2024-10-29

## 2024-10-29 DIAGNOSIS — C18.9 MALIGNANT NEOPLASM OF COLON, UNSPECIFIED PART OF COLON (HCC): Primary | ICD-10-CM

## 2024-10-29 NOTE — TELEPHONE ENCOUNTER
10/29/24  Screened by: Janessa Adair MA    Referring Provider     Pre- Screening:     There is no height or weight on file to calculate BMI. 26.38  Has patient been referred for a routine screening Colonoscopy? yes  Is the patient between 45-75 years old? yes      Previous Colonoscopy yes   If yes:    Date: 3 yrs     Facility: [facility]     Reason: colon cancer       Does the patient want to see a Gastroenterologist prior to their procedure OR are they having any GI symptoms? no    Has the patient been hospitalized or had abdominal surgery in the past 6 months? no    Does the patient use supplemental oxygen? no    Does the patient take Coumadin, Lovenox, Plavix, Elliquis, Xarelto, or other blood thinning medication? no    Has the patient had a stroke, cardiac event, or stent placed in the past year? no      If patient is between 45yrs - 49yrs, please advise patient that we will have to confirm benefits & coverage with their insurance company for a routine screening colonoscopy.

## 2024-10-29 NOTE — LETTER
October 29, 2024       Dear Aruna Quiros:    Here are the PREP instructions for your colonoscopy scheduled 11/6/24 with Dr Clemens at the [facility]. Please see address below .    If you have questions, please do not hesitate to call us at [phone].       Sincerely,    Janessa Adair MA

## 2024-11-06 ENCOUNTER — HOSPITAL ENCOUNTER (OUTPATIENT)
Dept: GASTROENTEROLOGY | Facility: HOSPITAL | Age: 54
Setting detail: OUTPATIENT SURGERY
Discharge: HOME | End: 2024-11-06
Attending: INTERNAL MEDICINE
Payer: COMMERCIAL

## 2024-11-06 ENCOUNTER — ANESTHESIA EVENT (OUTPATIENT)
Dept: GASTROENTEROLOGY | Facility: HOSPITAL | Age: 54
End: 2024-11-06
Payer: COMMERCIAL

## 2024-11-06 ENCOUNTER — ANESTHESIA (OUTPATIENT)
Dept: GASTROENTEROLOGY | Facility: HOSPITAL | Age: 54
End: 2024-11-06
Payer: COMMERCIAL

## 2024-11-06 VITALS
BODY MASS INDEX: 24.31 KG/M2 | HEIGHT: 64 IN | OXYGEN SATURATION: 99 % | WEIGHT: 142.42 LBS | DIASTOLIC BLOOD PRESSURE: 73 MMHG | RESPIRATION RATE: 18 BRPM | TEMPERATURE: 98.8 F | HEART RATE: 69 BPM | SYSTOLIC BLOOD PRESSURE: 119 MMHG

## 2024-11-06 DIAGNOSIS — C18.9 MALIGNANT NEOPLASM OF COLON, UNSPECIFIED PART OF COLON (HCC): ICD-10-CM

## 2024-11-06 PROCEDURE — G0105 COLORECTAL SCRN; HI RISK IND: HCPCS | Performed by: INTERNAL MEDICINE

## 2024-11-06 RX ORDER — SODIUM CHLORIDE, SODIUM LACTATE, POTASSIUM CHLORIDE, CALCIUM CHLORIDE 600; 310; 30; 20 MG/100ML; MG/100ML; MG/100ML; MG/100ML
INJECTION, SOLUTION INTRAVENOUS CONTINUOUS PRN
Status: DISCONTINUED | OUTPATIENT
Start: 2024-11-06 | End: 2024-11-06

## 2024-11-06 RX ORDER — PROPOFOL 10 MG/ML
INJECTION, EMULSION INTRAVENOUS AS NEEDED
Status: DISCONTINUED | OUTPATIENT
Start: 2024-11-06 | End: 2024-11-06

## 2024-11-06 RX ORDER — LIDOCAINE HYDROCHLORIDE 10 MG/ML
INJECTION, SOLUTION EPIDURAL; INFILTRATION; INTRACAUDAL; PERINEURAL AS NEEDED
Status: DISCONTINUED | OUTPATIENT
Start: 2024-11-06 | End: 2024-11-06

## 2024-11-06 RX ADMIN — PROPOFOL 50 MG: 10 INJECTION, EMULSION INTRAVENOUS at 14:01

## 2024-11-06 RX ADMIN — PROPOFOL 100 MG: 10 INJECTION, EMULSION INTRAVENOUS at 13:53

## 2024-11-06 RX ADMIN — LIDOCAINE HYDROCHLORIDE 50 MG: 10 INJECTION, SOLUTION EPIDURAL; INFILTRATION; INTRACAUDAL; PERINEURAL at 13:52

## 2024-11-06 RX ADMIN — PROPOFOL 50 MG: 10 INJECTION, EMULSION INTRAVENOUS at 13:55

## 2024-11-06 RX ADMIN — SODIUM CHLORIDE, SODIUM LACTATE, POTASSIUM CHLORIDE, AND CALCIUM CHLORIDE: .6; .31; .03; .02 INJECTION, SOLUTION INTRAVENOUS at 13:50

## 2024-11-06 RX ADMIN — PROPOFOL 50 MG: 10 INJECTION, EMULSION INTRAVENOUS at 13:58

## 2024-11-06 NOTE — ANESTHESIA PREPROCEDURE EVALUATION
Procedure:  COLONOSCOPY    Relevant Problems   ANESTHESIA (within normal limits)      CARDIO   (+) Hyperlipidemia      ENDO (within normal limits)      GI/HEPATIC  NPO confirmed  BMI 24.4   (+) Malignant neoplasm of colon (HCC) (Hx rectal CA 2014, s/p chemo + radiation, last colonoscopy 2021)      /RENAL (within normal limits)      HEMATOLOGY (within normal limits)      NEURO/PSYCH   (+) Anxiety      PULMONARY (within normal limits)   (-) Sleep apnea   (-) URI (upper respiratory infection)      Allergies   Allergen Reactions    Iodinated Contrast Media      Social History     Tobacco Use    Smoking status: Every Day     Current packs/day: 0.50     Average packs/day: 0.5 packs/day for 24.8 years (12.4 ttl pk-yrs)     Types: Cigarettes     Start date: 2000     Passive exposure: Current    Smokeless tobacco: Never   Vaping Use    Vaping status: Never Used   Substance Use Topics    Alcohol use: Yes    Drug use: Yes     Types: Marijuana     Current Outpatient Medications   Medication Instructions    rosuvastatin (CRESTOR) 10 MG tablet TAKE 1 TABLET BY MOUTH EVERY DAY     Lab Results   Component Value Date    WBC 8.28 10/06/2023    HGB 14.6 10/06/2023    HCT 44.7 10/06/2023     10/06/2023    SODIUM 139 10/06/2023    K 4.1 10/06/2023     10/06/2023    CO2 29 10/06/2023    BUN 14 10/06/2023    CREATININE 0.83 10/06/2023    GLUC 95 03/02/2022    AST 19 10/06/2023    ALT 18 10/06/2023    ALKPHOS 71 10/06/2023    TBILI 0.38 10/06/2023    ALB 4.1 10/06/2023    PROTIME 13.1 01/12/2018    PTT 30 01/12/2018    INR 0.99 01/12/2018     Vitals:    11/06/24 1223   BP: 121/58   Pulse: 75   Resp: 15   Temp: (!) 97.2 °F (36.2 °C)   SpO2: 97%       Physical Exam    Airway      TM Distance: >3 FB  Neck ROM: full     Dental   Comment: Denies loose/chipped teeth, No notable dental hx     Cardiovascular  Rhythm: regular, Rate: normal    Pulmonary   Breath sounds clear to auscultation    Other  Findings  post-pubertal.      Anesthesia Plan  ASA Score- 2     Anesthesia Type- IV sedation with anesthesia with ASA Monitors.         Additional Monitors:     Airway Plan:     Comment: O2 mask, natural airway, EtCO2 monitor. Risks discussed including awareness, aspiration, drug reactions and conversion to GA..       Plan Factors-Exercise tolerance (METS): >4 METS.    Chart reviewed.    Patient summary reviewed.    Patient is a current smoker.  Patient instructed to abstain from smoking on day of procedure. Patient did not smoke on day of surgery.            Induction- intravenous.    Postoperative Plan-     Perioperative Resuscitation Plan - Level 1 - Full Code.       Informed Consent- Anesthetic plan and risks discussed with patient.  I personally reviewed this patient with the CRNA. Discussed and agreed on the Anesthesia Plan with the CRNA..

## 2024-11-06 NOTE — H&P
History and Physical - [facility]  Aruna Quiros 53 y.o. female MRN: [id number]                  HPI: Aruna Quiros is a 53 y.o. year old female who presents for colonoscopy for history of rectal cancer      REVIEW OF SYSTEMS: Per the HPI, and otherwise unremarkable.    Historical Information   Past Medical History:   Diagnosis Date    Cancer (HCC)     Colon cancer (HCC) 2014    History of chemotherapy 2014    colon ca    History of radiation therapy 2014    colon ca    History of rectal cancer 2014    Locally advanced     History of tear of meniscus of knee joint     Hyperlipidemia     Tennis elbow      Past Surgical History:   Procedure Laterality Date    AUGMENTATION MAMMAPLASTY Bilateral 2005    BREAST SURGERY Bilateral     RECONSTRUCION WITH IMPLANT PROTHESIS     COLON SURGERY      COLONOSCOPY  04/2021    ELBOW SURGERY Left 2015    Tennis elbow    KNEE SURGERY      MENISCECTOMY Left 06/2016     Social History   Social History     Substance and Sexual Activity   Alcohol Use Yes     Social History     Substance and Sexual Activity   Drug Use Yes    Types: Marijuana     Social History     Tobacco Use   Smoking Status Every Day    Current packs/day: 0.50    Average packs/day: 0.5 packs/day for 24.8 years (12.4 ttl pk-yrs)    Types: Cigarettes    Start date: 2000    Passive exposure: Current   Smokeless Tobacco Never     Family History   Problem Relation Age of Onset    Diabetes Mother     Heart failure Mother     Heart defect Father         CARDIAC PACEMAKER    Hip fracture Father         HIP REPLACEMENT    Breast cancer Sister 47    BRCA1 Negative Sister     BRCA2 Negative Sister     Lung cancer Maternal Grandmother 65    Breast cancer Maternal Aunt 52        again at 65    No Known Problems Sister     Colon cancer Maternal Aunt     No Known Problems Paternal Aunt     No Known Problems Paternal Aunt     No Known Problems Paternal Aunt     No Known Problems Paternal Aunt     No Known Problems  "Paternal Grandmother     Ovarian cancer Neg Hx     Uterine cancer Neg Hx     Cervical cancer Neg Hx     Endometrial cancer Neg Hx        Meds/Allergies     Not in a hospital admission.    Allergies   Allergen Reactions    Iodinated Contrast Media        Objective     Blood pressure 121/58, pulse 75, temperature (!) 97.2 °F (36.2 °C), temperature source Temporal, resp. rate 15, height 5' 4\" (1.626 m), weight 64.6 kg (142 lb 6.7 oz), last menstrual period 07/01/2014, SpO2 97%, not currently breastfeeding.      PHYSICAL EXAM    /58   Pulse 75   Temp (!) 97.2 °F (36.2 °C) (Temporal)   Resp 15   Ht 5' 4\" (1.626 m)   Wt 64.6 kg (142 lb 6.7 oz)   LMP 07/01/2014 (Within Days) Comment: Medical induced menopsuse   SpO2 97%   BMI 24.45 kg/m²       Gen: NAD  CV: RRR  CHEST: Clear  ABD: soft, NT/ND  EXT: no edema      ASSESSMENT/PLAN:  This is a 53 y.o. year old female here for colonoscopy, and she is stable and optimized for her procedure.        "

## 2024-11-07 ENCOUNTER — APPOINTMENT (OUTPATIENT)
Dept: LAB | Facility: HOSPITAL | Age: 54
End: 2024-11-07
Payer: COMMERCIAL

## 2024-11-07 DIAGNOSIS — Z00.00 HEALTHCARE MAINTENANCE: ICD-10-CM

## 2024-11-07 DIAGNOSIS — E55.9 VITAMIN D DEFICIENCY: ICD-10-CM

## 2024-11-07 LAB
25(OH)D3 SERPL-MCNC: 24.7 NG/ML (ref 30–100)
ALBUMIN SERPL BCG-MCNC: 4.5 G/DL (ref 3.5–5)
ALP SERPL-CCNC: 73 U/L (ref 34–104)
ALT SERPL W P-5'-P-CCNC: 17 U/L (ref 7–52)
ANION GAP SERPL CALCULATED.3IONS-SCNC: 7 MMOL/L (ref 4–13)
AST SERPL W P-5'-P-CCNC: 20 U/L (ref 13–39)
BASOPHILS # BLD AUTO: 0.07 THOUSANDS/ÂΜL (ref 0–0.1)
BASOPHILS NFR BLD AUTO: 1 % (ref 0–1)
BILIRUB SERPL-MCNC: 0.51 MG/DL (ref 0.2–1)
BUN SERPL-MCNC: 9 MG/DL (ref 5–25)
CALCIUM SERPL-MCNC: 9.5 MG/DL (ref 8.4–10.2)
CHLORIDE SERPL-SCNC: 106 MMOL/L (ref 96–108)
CHOLEST SERPL-MCNC: 235 MG/DL
CO2 SERPL-SCNC: 27 MMOL/L (ref 21–32)
CREAT SERPL-MCNC: 0.83 MG/DL (ref 0.6–1.3)
EOSINOPHIL # BLD AUTO: 0.22 THOUSAND/ÂΜL (ref 0–0.61)
EOSINOPHIL NFR BLD AUTO: 3 % (ref 0–6)
ERYTHROCYTE [DISTWIDTH] IN BLOOD BY AUTOMATED COUNT: 13.2 % (ref 11.6–15.1)
GFR SERPL CREATININE-BSD FRML MDRD: 81 ML/MIN/1.73SQ M
GLUCOSE P FAST SERPL-MCNC: 96 MG/DL (ref 65–99)
HCT VFR BLD AUTO: 46.5 % (ref 34.8–46.1)
HDLC SERPL-MCNC: 75 MG/DL
HGB BLD-MCNC: 15.1 G/DL (ref 11.5–15.4)
IMM GRANULOCYTES # BLD AUTO: 0.02 THOUSAND/UL (ref 0–0.2)
IMM GRANULOCYTES NFR BLD AUTO: 0 % (ref 0–2)
LDLC SERPL CALC-MCNC: 141 MG/DL (ref 0–100)
LYMPHOCYTES # BLD AUTO: 3.65 THOUSANDS/ÂΜL (ref 0.6–4.47)
LYMPHOCYTES NFR BLD AUTO: 42 % (ref 14–44)
MCH RBC QN AUTO: 30.4 PG (ref 26.8–34.3)
MCHC RBC AUTO-ENTMCNC: 32.5 G/DL (ref 31.4–37.4)
MCV RBC AUTO: 94 FL (ref 82–98)
MONOCYTES # BLD AUTO: 0.63 THOUSAND/ÂΜL (ref 0.17–1.22)
MONOCYTES NFR BLD AUTO: 7 % (ref 4–12)
NEUTROPHILS # BLD AUTO: 4.02 THOUSANDS/ÂΜL (ref 1.85–7.62)
NEUTS SEG NFR BLD AUTO: 47 % (ref 43–75)
NONHDLC SERPL-MCNC: 160 MG/DL
NRBC BLD AUTO-RTO: 0 /100 WBCS
PLATELET # BLD AUTO: 327 THOUSANDS/UL (ref 149–390)
PMV BLD AUTO: 8.8 FL (ref 8.9–12.7)
POTASSIUM SERPL-SCNC: 4.2 MMOL/L (ref 3.5–5.3)
PROT SERPL-MCNC: 7.1 G/DL (ref 6.4–8.4)
RBC # BLD AUTO: 4.96 MILLION/UL (ref 3.81–5.12)
SODIUM SERPL-SCNC: 140 MMOL/L (ref 135–147)
TRIGL SERPL-MCNC: 95 MG/DL
TSH SERPL DL<=0.05 MIU/L-ACNC: 1.77 UIU/ML (ref 0.45–4.5)
WBC # BLD AUTO: 8.61 THOUSAND/UL (ref 4.31–10.16)

## 2024-11-07 PROCEDURE — 80061 LIPID PANEL: CPT

## 2024-11-07 PROCEDURE — 85025 COMPLETE CBC W/AUTO DIFF WBC: CPT

## 2024-11-07 PROCEDURE — 84443 ASSAY THYROID STIM HORMONE: CPT

## 2024-11-07 PROCEDURE — 36415 COLL VENOUS BLD VENIPUNCTURE: CPT

## 2024-11-07 PROCEDURE — 82306 VITAMIN D 25 HYDROXY: CPT

## 2024-11-07 PROCEDURE — 80053 COMPREHEN METABOLIC PANEL: CPT

## 2024-11-07 NOTE — ANESTHESIA POSTPROCEDURE EVALUATION
Post-Op Assessment Note    CV Status:  Stable  Pain Score: 0    Pain management: adequate       Mental Status:  Alert and awake   Hydration Status:  Euvolemic   PONV Controlled:  Controlled   Airway Patency:  Patent     Post Op Vitals Reviewed: Yes    No anethesia notable event occurred.    Staff: CRNA, Anesthesiologist           Last Filed PACU Vitals:  Vitals Value Taken Time   Temp 98.8 °F (37.1 °C) 11/06/24 1408   Pulse 69 11/06/24 1428   /73 11/06/24 1428   Resp 18 11/06/24 1428   SpO2 99 % 11/06/24 1428       Modified Rama:  Activity: 2 (11/6/2024  2:28 PM)  Respiration: 2 (11/6/2024  2:28 PM)  Circulation: 2 (11/6/2024  2:28 PM)  Consciousness: 2 (11/6/2024  2:28 PM)  Oxygen Saturation: 2 (11/6/2024  2:28 PM)  Modified Rama Score: 10 (11/6/2024  2:28 PM)

## 2024-11-20 ENCOUNTER — RESULTS FOLLOW-UP (OUTPATIENT)
Dept: FAMILY MEDICINE CLINIC | Facility: CLINIC | Age: 54
End: 2024-11-20

## 2024-12-13 ENCOUNTER — TELEMEDICINE (OUTPATIENT)
Dept: BEHAVIORAL/MENTAL HEALTH CLINIC | Facility: CLINIC | Age: 54
End: 2024-12-13
Payer: COMMERCIAL

## 2024-12-13 DIAGNOSIS — F41.9 ANXIETY: Primary | ICD-10-CM

## 2024-12-13 PROCEDURE — 90837 PSYTX W PT 60 MINUTES: CPT | Performed by: SOCIAL WORKER

## 2024-12-16 NOTE — PSYCH
Behavioral Health Psychotherapy Progress Note    Psychotherapy Provided: Individual Psychotherapy     1. Anxiety              Behavioral Health Treatment Plan and Discharge Planning: Aruna Quiros is aware of and agrees to continue to work on their treatment plan. They have identified and are working toward their discharge goals. yes        Visit start and stop times:    12/13/24  Start Time: 0700  Stop Time: 0753  Total Visit Time: 53 minutes  Virtual Regular Visit  Therapist met w/pt for individual session.  Symptoms include: anxiety.  Pt shared that she has noticed continued improvement with her mood.  She stated that she is working with her brother on renovating her kitchen which has been really enjoyable. She expressed feeling really passionate about the work they have been doing which has been making her think about changing her career.  She stated that she is surprised she likes it so much and is trying to just embrace the time spent.  Pt shared that she hasn't had any anger outbursts and also hasn't felt like she has had any mood swings.  Therapist and pt discussed ongoing strategies to implement to maintain mood stability.     Verification of patient location:    Patient is located at Home in the following state in which I hold an active license [place]      Assessment/Plan: f/u in a month    Problem List Items Addressed This Visit       Anxiety - Primary           Reason for visit is No chief complaint on file.       Encounter provider Oumou Lin      Recent Visits  Date Type Provider Dept   12/13/24 Telemedicine Oumou Lin [facility] [address]   Showing recent visits within past 7 days and meeting all other requirements  Future Appointments  No visits were found meeting these conditions.  Showing future appointments within next 150 days and meeting all other requirements       The patient was identified by name and date of birth. Aruna Quiros was informed that this is a telemedicine  visit and that the visit is being conducted throughthe Epic Embedded platform. She agrees to proceed..  My office door was closed. No one else was in the room.  She acknowledged consent and understanding of privacy and security of the video platform. The patient has agreed to participate and understands they can discontinue the visit at any time.    Patient is aware this is a billable service.     Subjective  Aruna Quiros is a 53 y.o. female  .      HPI     Past Medical History:   Diagnosis Date    Cancer (HCC)     Colon cancer (HCC) 2014    History of chemotherapy 2014    colon ca    History of radiation therapy 2014    colon ca    History of rectal cancer 2014    Locally advanced     History of tear of meniscus of knee joint     Hyperlipidemia     Tennis elbow        Past Surgical History:   Procedure Laterality Date    AUGMENTATION MAMMAPLASTY Bilateral 2005    BREAST SURGERY Bilateral     RECONSTRUCION WITH IMPLANT PROTHESIS     COLON SURGERY      COLONOSCOPY  04/2021    ELBOW SURGERY Left 2015    Tennis elbow    KNEE SURGERY      MENISCECTOMY Left 06/2016       Current Outpatient Medications   Medication Sig Dispense Refill    rosuvastatin (CRESTOR) 10 MG tablet TAKE 1 TABLET BY MOUTH EVERY DAY 90 tablet 2     No current facility-administered medications for this visit.        Allergies   Allergen Reactions    Iodinated Contrast Media        Review of Systems    Video Exam    There were no vitals filed for this visit.    Physical Exam Pt denied any SI/Hi/Ah/VH

## 2024-12-16 NOTE — BH TREATMENT PLAN
"Outpatient Behavioral Health Psychotherapy Treatment Plan    Aruna Quiros  1970     Date of Initial Psychotherapy Assessment: 12/13/24   Date of Current Treatment Plan: 12/13/24  Treatment Plan Target Date: TBD  Treatment Plan Expiration Date: 5/13/25    Diagnosis:   1. Anxiety            Area(s) of Need: ongoing emotional support    Long Term Goal 1 (in the client's own words): \"To continue to maintain mood stability.\"    Stage of Change: Maintenance    Target Date for completion: TBD     Anticipated therapeutic modalities: individual      People identified to complete this goal: pt      Objective 1: (identify the means of measuring success in meeting the objective): Pt will engage in self care at least 1x/day      Objective 2: (identify the means of measuring success in meeting the objective): Pt will identify 2 or 3 coping skills implemented throughout the month and share in session         I am currently under the care of a [facility] psychiatric provider: no    My [facility] psychiatric provider is: n/a    I am currently taking psychiatric medications: No    I feel that I will be ready for discharge from mental health care when I reach the following (measurable goal/objective): \"I feel like I will always need a check in to hold me accountable.\"    For children and adults who have a legal guardian:   Has there been any change to custody orders and/or guardianship status? NA. If yes, attach updated documentation.    I have updated my Crisis Plan and have been offered a copy of this plan    Behavioral Health Treatment Plan [facility]: Diagnosis and Treatment Plan explained to Aruna Quiros Aruna Quiros acknowledges an understanding of their diagnosis. Aruna Quiros agrees to this treatment plan.    I have been offered a copy of this Treatment Plan. Yes    Aruna Quiros, 1970, actively participated in the review and update of this treatment plan during a virtual session, using the Epic Embedded platform.   Aruna" Ishaan  provided verbal consent on 12/13/2024 at 7:45 AM. The treatment plan was transcribed into the Electronic Health Record at a later time.

## 2025-01-17 ENCOUNTER — TELEMEDICINE (OUTPATIENT)
Dept: BEHAVIORAL/MENTAL HEALTH CLINIC | Facility: CLINIC | Age: 55
End: 2025-01-17
Payer: COMMERCIAL

## 2025-01-17 DIAGNOSIS — F41.9 ANXIETY: Primary | ICD-10-CM

## 2025-01-17 DIAGNOSIS — E78.5 HYPERLIPIDEMIA, UNSPECIFIED HYPERLIPIDEMIA TYPE: ICD-10-CM

## 2025-01-17 PROCEDURE — 90837 PSYTX W PT 60 MINUTES: CPT | Performed by: SOCIAL WORKER

## 2025-01-17 RX ORDER — ROSUVASTATIN CALCIUM 10 MG/1
10 TABLET, COATED ORAL DAILY
Qty: 90 TABLET | Refills: 1 | Status: SHIPPED | OUTPATIENT
Start: 2025-01-17

## 2025-01-20 NOTE — PSYCH
"Behavioral Health Psychotherapy Progress Note    Psychotherapy Provided: Individual Psychotherapy     1. Anxiety              Behavioral Health Treatment Plan and Discharge Planning: Aruna Quiros is aware of and agrees to continue to work on their treatment plan. They have identified and are working toward their discharge goals. yes    Depression Follow-up Plan Completed: Not applicable    Visit start and stop times:    01/17/25  Start Time: 0700  Stop Time: 0755  Total Visit Time: 55 minutes  Virtual Regular Visit  Therapist met w/pt for individual session.  Symptoms include: irritability, feeling on edge, worry.  She stated that overall she still feels as if she is doing a good job managing her emotions.  She identified one situation where she felt some anger and anxiety but rather than react she took some time to \"cool off\" and then addressed it with her loved one.  Therapist and pt discussed ways she would have handled it a few years ago and strategies she was able to implement this time to have a better response.  Pt shared that they continue to renovate her kitchen and is hoping it is done by next session.  She stated that she has been patient but with everything being dirty and unorganized from the work she is ready to have her house back to \"normal.\"  Verification of patient location:    Patient is located at Home in the following state in which I hold an active license [place]      Assessment/Plan: f/u in one month    Problem List Items Addressed This Visit       Anxiety - Primary         Reason for visit is No chief complaint on file.       Encounter provider Oumou Lin      Recent Visits  Date Type Provider Dept   01/17/25 Telemedicine Oumou Lin [facility] [address]   Showing recent visits within past 7 days and meeting all other requirements  Future Appointments  No visits were found meeting these conditions.  Showing future appointments within next 150 days and meeting all other " requirements       The patient was identified by name and date of birth. Aruna Quiros was informed that this is a telemedicine visit and that the visit is being conducted throughthe Epic Embedded platform. She agrees to proceed..  My office door was closed. No one else was in the room.  She acknowledged consent and understanding of privacy and security of the video platform. The patient has agreed to participate and understands they can discontinue the visit at any time.    Patient is aware this is a billable service.     Subjective  Aruna Quiros is a 54 y.o. female  .      HPI     Past Medical History:   Diagnosis Date    Cancer (HCC)     Colon cancer (HCC) 2014    History of chemotherapy 2014    colon ca    History of radiation therapy 2014    colon ca    History of rectal cancer 2014    Locally advanced     History of tear of meniscus of knee joint     Hyperlipidemia     Tennis elbow        Past Surgical History:   Procedure Laterality Date    AUGMENTATION MAMMAPLASTY Bilateral 2005    BREAST SURGERY Bilateral     RECONSTRUCION WITH IMPLANT PROTHESIS     COLON SURGERY      COLONOSCOPY  04/2021    ELBOW SURGERY Left 2015    Tennis elbow    KNEE SURGERY      MENISCECTOMY Left 06/2016       Current Outpatient Medications   Medication Sig Dispense Refill    rosuvastatin (CRESTOR) 10 MG tablet TAKE 1 TABLET BY MOUTH EVERY DAY 90 tablet 1     No current facility-administered medications for this visit.        Allergies   Allergen Reactions    Iodinated Contrast Media        Review of Systems    Video Exam    There were no vitals filed for this visit.    Physical Exam Pt denied any SI/HI/AH/VH

## 2025-02-21 ENCOUNTER — TELEMEDICINE (OUTPATIENT)
Dept: BEHAVIORAL/MENTAL HEALTH CLINIC | Facility: CLINIC | Age: 55
End: 2025-02-21
Payer: COMMERCIAL

## 2025-02-21 DIAGNOSIS — F41.9 ANXIETY: Primary | ICD-10-CM

## 2025-02-21 PROCEDURE — 90837 PSYTX W PT 60 MINUTES: CPT | Performed by: SOCIAL WORKER

## 2025-02-24 NOTE — PSYCH
"Behavioral Health Psychotherapy Progress Note    Psychotherapy Provided: Individual Psychotherapy     1. Anxiety          Therapist met w/pt for individual session.  Symptoms include: stress and anxiety. She stated that she hasn't felt \"down\" but has been overwhelmed w/some recent renovations.  She shared that she is looking forward to having her kitchen back and to be able to cook.  She shared that she has been cleaning a lot which is a good destressor.  She identified some times where she was triggered but was able to approach things calmly rather than with anger.  Pt shared that she conitnues to keep in contact w/people she used to work with and is grateful that she isn't working  like she used to and having all the \"ups and downs.\"      Behavioral Health Treatment Plan and Discharge Planning: Aruna Quiros is aware of and agrees to continue to work on their treatment plan. They have identified and are working toward their discharge goals. yes    Depression Follow-up Plan Completed: Not applicable    Visit start and stop times:    25  Start Time: 0700  Stop Time: 0755  Total Visit Time: 55 minutes  Virtual Regular VisitName: Aruna Quiros      : 1970      MRN: [id number]  Encounter Provider: Oumou Lin  Encounter Date: 2025   Encounter department: [facility] [address]  :  Assessment & Plan  Anxiety           Reason for visit is No chief complaint on file.     Recent Visits  Date Type Provider Dept   25 Telemedicine Oumou Lin [facility] [address]   Showing recent visits within past 7 days and meeting all other requirements  Future Appointments  No visits were found meeting these conditions.  Showing future appointments within next 150 days and meeting all other requirements     History of Present Illness     Aruna Quiros is a 54 y.o. female  .  HPI    Past Medical History:   Diagnosis Date    Cancer (HCC)     " Colon cancer (HCC) 2014    History of chemotherapy 2014    colon ca    History of radiation therapy 2014    colon ca    History of rectal cancer 2014    Locally advanced     History of tear of meniscus of knee joint     Hyperlipidemia     Tennis elbow      Past Surgical History:   Procedure Laterality Date    AUGMENTATION MAMMAPLASTY Bilateral 2005    BREAST SURGERY Bilateral     RECONSTRUCION WITH IMPLANT PROTHESIS     COLON SURGERY      COLONOSCOPY  04/2021    ELBOW SURGERY Left 2015    Tennis elbow    KNEE SURGERY      MENISCECTOMY Left 06/2016     Current Outpatient Medications   Medication Instructions    rosuvastatin (CRESTOR) 10 mg, Oral, Daily     Allergies   Allergen Reactions    Iodinated Contrast Media        Review of Systems    Objective   LMP 07/01/2014 (Within Days) Comment: Medical induced menopsuse     Video Exam  Physical Exam     Administrative Statements   Encounter provider Oumou Lin    The Patient is located at Home and in the following state in which I hold an active license [place].    The patient was identified by name and date of birth. Aruna Quiros was informed that this is a telemedicine visit and that the visit is being conducted through the Epic Embedded platform. She agrees to proceed..  My office door was closed. No one else was in the room.  She acknowledged consent and understanding of privacy and security of the video platform. The patient has agreed to participate and understands they can discontinue the visit at any time.    I have spent a total time of 55 minutes in caring for this patient on the day of the visit/encounter including Counseling / Coordination of care.

## 2025-03-21 ENCOUNTER — TELEMEDICINE (OUTPATIENT)
Dept: BEHAVIORAL/MENTAL HEALTH CLINIC | Facility: CLINIC | Age: 55
End: 2025-03-21
Payer: COMMERCIAL

## 2025-03-21 DIAGNOSIS — F41.9 ANXIETY: Primary | ICD-10-CM

## 2025-03-21 PROCEDURE — 90837 PSYTX W PT 60 MINUTES: CPT | Performed by: SOCIAL WORKER

## 2025-03-21 NOTE — PSYCH
Virtual Regular VisitName: Aruna Quiros      : 1970      MRN: [id number]  Encounter Provider: Oumou Lin  Encounter Date: 3/21/2025   Encounter department: [facility] [address]  :  Assessment & Plan        DATA:   During this The therapist met w/pt for an individual session. Pt started with session with the grand reveal of her newly self-renovated kitchen. The patient appeared proud, and happy. She displayed appropriate smiles and laughter. Therapist praised pt for a job well-done and therapist and patient discussed the teamwork involved in the project. Therapist probed for any regrets with the long-term renovation project which the pt denied having.  Therapist and pt discussed plans for the kitchen in terms of finding a balance of having time to herself as well as inviting loved ones over to host.  She identified that home is a safe space for her but she also finds kenneth in cooking for others.    Therapist inquired about social contacts. Pt explained that she is starting to make plans with former coworkers and friends for the near future. Therapist encouraged pt to support her mental health by continuing to set appropriate boundaries in terms of who she spends time with and how often she goes out.  Therapist and pt discussed anger outbursts. Pt shared there was only one short-lived issue with a throw pillow. The therapist validated the patient's progress, noting no harm was done. Therapist and pt laughed over the pillow being one up on her.  Therapist praised pt for using coping skills such as staying in the moment, focusing on the kitchen, and going with the flow. The therapist asked pt about family contact and reminded pt that the tension she feels around her sister is the reason the pt sets limits and maintains her boundaries.  Therapist commented on pt's apparent kenneth seen during the session. The therapist and pt discussed the progress pt made over the  "last 2 years. The therapist reminded pt that she continued to show up over the years to do the hard work, despite her initial reservations about therapy.      session, this clinician used the following therapeutic modalities: Cognitive Behavioral Therapy    Substance Abuse was addressed during this session. If the client is diagnosed with a co-occurring substance use disorder, please indicate any changes in the frequency or amount of use: n/a. Stage of change for addressing substance use diagnoses: No substance use/Not applicable    ASSESSMENT:  Aruna presents with a Euthymic/ normal mood. Aruna's affect is Normal range and intensity, which is congruent, with their mood and the content of the session. The client has made progress on their goals as evidenced by pts self report and observations.    Aruna presents with a none risk of suicide, none risk of self-harm, and none risk of harm to others.    For any risk assessment that surpasses a \"low\" rating, a safety plan must be developed.    A safety plan was indicated: no  If yes, describe in detail n/a    PLAN: Between sessions, Aruna will continue to work on regulating her emotions and setting boundaries. At the next session, the therapist will use Cognitive Behavioral Therapy to address symptoms of anxiety.    Behavioral Health Treatment Plan [facility]: Diagnosis and Treatment Plan explained to Aruna, Aruna relates understanding diagnosis and is agreeable to Treatment Plan. Yes     Depression Follow-up Plan Completed: Not applicable     Reason for visit is No chief complaint on file.     Recent Visits  No visits were found meeting these conditions.  Showing recent visits within past 7 days and meeting all other requirements  Today's Visits  Date Type Provider Dept   03/21/25 Telemedicine Oumou Lin [facility] [address]   Showing today's visits and meeting all other requirements  Future Appointments  No visits were found meeting these " conditions.  Showing future appointments within next 150 days and meeting all other requirements     History of Present Illness     HPI    Past Medical History   Past Medical History:   Diagnosis Date    Cancer (HCC)     Colon cancer (HCC) 2014    History of chemotherapy 2014    colon ca    History of radiation therapy 2014    colon ca    History of rectal cancer 2014    Locally advanced     History of tear of meniscus of knee joint     Hyperlipidemia     Tennis elbow      Past Surgical History:   Procedure Laterality Date    AUGMENTATION MAMMAPLASTY Bilateral 2005    BREAST SURGERY Bilateral     RECONSTRUCION WITH IMPLANT PROTHESIS     COLON SURGERY      COLONOSCOPY  04/2021    ELBOW SURGERY Left 2015    Tennis elbow    KNEE SURGERY      MENISCECTOMY Left 06/2016     Current Outpatient Medications   Medication Instructions    rosuvastatin (CRESTOR) 10 mg, Oral, Daily     Allergies   Allergen Reactions    Iodinated Contrast Media        Objective   LMP 07/01/2014 (Within Days) Comment: Medical induced menopsuse     Video Exam  Physical Exam     Administrative Statements   Encounter provider Oumou Lin    The Patient is located at Home and in the following state in which I hold an active license [place].    The patient was identified by name and date of birth. Aruna Quiros was informed that this is a telemedicine visit and that the visit is being conducted through the Epic Embedded platform. She agrees to proceed..  My office door was closed. No one else was in the room.  She acknowledged consent and understanding of privacy and security of the video platform. The patient has agreed to participate and understands they can discontinue the visit at any time.        Visit Time  Start Time: 0700  Stop Time: 0754  Total Visit Time: 54 minutes

## 2025-04-25 ENCOUNTER — TELEMEDICINE (OUTPATIENT)
Dept: BEHAVIORAL/MENTAL HEALTH CLINIC | Facility: CLINIC | Age: 55
End: 2025-04-25
Payer: COMMERCIAL

## 2025-04-25 DIAGNOSIS — F41.9 ANXIETY: Primary | ICD-10-CM

## 2025-04-25 PROCEDURE — 90837 PSYTX W PT 60 MINUTES: CPT | Performed by: SOCIAL WORKER

## 2025-04-25 NOTE — PSYCH
Virtual Regular VisitName: Aruna Quiros      : 1970      MRN: [id number]  Encounter Provider: Oumou Riley  Encounter Date: 2025   Encounter department: [facility] [address]  :  Assessment & Plan  Anxiety             DATA: Therapist met w/pt for individual session.  Symptoms include: racing thoughts and feeling overwhelmed at times.  Pt shared that she had a good holiday with her family.  She stated she loved creating memories with them in her home with her newly renovated kitchen.  Pt shared that she was able to spend some quality time with them and also cooked which is something she really enjoys.  She shared that her sister also came to visit which went well.  Therapist and pt discussed the anxiety she felt prior but the fact she didn't have any expectations helped her.  Pt shared that her sister asked to spend mother's day together in celebration of there mother but pt was able to set a boundary with her.  Pt stated that in the past she would have agreed to appease her but realizes what helps her and what doesn't help and can communicate her boundaries better.      During this session, this clinician used the following therapeutic modalities: Cognitive Behavioral Therapy    Substance Abuse was not addressed during this session. If the client is diagnosed with a co-occurring substance use disorder, please indicate any changes in the frequency or amount of use: none. Stage of change for addressing substance use diagnoses: No substance use/Not applicable    ASSESSMENT:  Aruna presents with a Euthymic/ normal mood. Aruna's affect is Normal range and intensity, which is congruent, with their mood and the content of the session. The client has made progress on their goals as evidenced by pts improved symptoms and being able to set healthy boundaries for herself.    Aruna presents with a none risk of suicide, none risk of self-harm, and none risk of harm to  "others.    For any risk assessment that surpasses a \"low\" rating, a safety plan must be developed.    A safety plan was indicated: no  If yes, describe in detail n/a    PLAN: Between sessions, Aruna will continue to maintain healthy boundaries w/others. At the next session, the therapist will use Client-centered Therapy and Cognitive Behavioral Therapy to address symptoms of anxiety and depression.    Behavioral Health Treatment Plan [facility]: Diagnosis and Treatment Plan explained to Aruna, Aruna relates understanding diagnosis and is agreeable to Treatment Plan. Yes     Depression Follow-up Plan Completed: Not applicable     Reason for visit is No chief complaint on file.     Recent Visits  No visits were found meeting these conditions.  Showing recent visits within past 7 days and meeting all other requirements  Today's Visits  Date Type Provider Dept   04/25/25 Telemedicine Oumou Lin [facility] [address]   Showing today's visits and meeting all other requirements  Future Appointments  No visits were found meeting these conditions.  Showing future appointments within next 150 days and meeting all other requirements     History of Present Illness     HPI    Past Medical History   Past Medical History:   Diagnosis Date    Cancer (HCC)     Colon cancer (HCC) 2014    History of chemotherapy 2014    colon ca    History of radiation therapy 2014    colon ca    History of rectal cancer 2014    Locally advanced     History of tear of meniscus of knee joint     Hyperlipidemia     Tennis elbow      Past Surgical History:   Procedure Laterality Date    AUGMENTATION MAMMAPLASTY Bilateral 2005    BREAST SURGERY Bilateral     RECONSTRUCION WITH IMPLANT PROTHESIS     COLON SURGERY      COLONOSCOPY  04/2021    ELBOW SURGERY Left 2015    Tennis elbow    KNEE SURGERY      MENISCECTOMY Left 06/2016     Current Outpatient Medications   Medication Instructions    rosuvastatin (CRESTOR) 10 mg, Oral, Daily "     Allergies   Allergen Reactions    Iodinated Contrast Media        Objective   LMP 07/01/2014 (Within Days) Comment: Medical induced menopsuse     Video Exam  Physical Exam     Administrative Statements   Encounter provider Oumou Lin    The Patient is located at Home and in the following state in which I hold an active license [place].    The patient was identified by name and date of birth. Aruna Quiros was informed that this is a telemedicine visit and that the visit is being conducted through the Epic Embedded platform. She agrees to proceed..  My office door was closed. No one else was in the room.  She acknowledged consent and understanding of privacy and security of the video platform. The patient has agreed to participate and understands they can discontinue the visit at any time.        Visit Time  Start Time: 0700  Stop Time: 0755  Total Visit Time: 55 minutes

## 2025-05-12 ENCOUNTER — ANNUAL EXAM (OUTPATIENT)
Dept: OBGYN CLINIC | Facility: CLINIC | Age: 55
End: 2025-05-12
Payer: COMMERCIAL

## 2025-05-12 VITALS
DIASTOLIC BLOOD PRESSURE: 82 MMHG | SYSTOLIC BLOOD PRESSURE: 120 MMHG | BODY MASS INDEX: 25.44 KG/M2 | WEIGHT: 149 LBS | HEIGHT: 64 IN

## 2025-05-12 DIAGNOSIS — Z85.038 HISTORY OF COLON CANCER: ICD-10-CM

## 2025-05-12 DIAGNOSIS — R92.343 EXTREMELY DENSE TISSUE OF BOTH BREASTS ON MAMMOGRAPHY: ICD-10-CM

## 2025-05-12 DIAGNOSIS — Z12.4 ENCOUNTER FOR SCREENING FOR CERVICAL CANCER: ICD-10-CM

## 2025-05-12 DIAGNOSIS — Z12.31 ENCOUNTER FOR SCREENING MAMMOGRAM FOR BREAST CANCER: ICD-10-CM

## 2025-05-12 DIAGNOSIS — Z01.419 ENCOUNTER FOR ANNUAL ROUTINE GYNECOLOGICAL EXAMINATION: Primary | ICD-10-CM

## 2025-05-12 PROCEDURE — G0145 SCR C/V CYTO,THINLAYER,RESCR: HCPCS | Performed by: NURSE PRACTITIONER

## 2025-05-12 PROCEDURE — S0612 ANNUAL GYNECOLOGICAL EXAMINA: HCPCS | Performed by: NURSE PRACTITIONER

## 2025-05-12 PROCEDURE — G0476 HPV COMBO ASSAY CA SCREEN: HCPCS | Performed by: NURSE PRACTITIONER

## 2025-05-12 NOTE — PATIENT INSTRUCTIONS
Patient Education     Lowering Your Risk of Breast Cancer   About this topic   Breast cancer is a serious illness. Breast cancer is when abnormal cells grow and divide more quickly in your breast. These cells form a growth or tumor. The abnormal cells may enter nearby tissue and spread to other parts of the body. It is the type of cancer most often seen in women. Men can have breast cancer, but it is a rare condition.  General   Some things in your life may increase your risk of breast cancer. You may not be able to change some of these. Others you can control.  You are more likely to get breast cancer if you:  Have a mother, sister, or daughter who has had breast cancer  Have used hormones for menopause for more than 5 years  Have had radiation therapy to the breast or chest in the past  Are overweight or do not exercise  Had your first menstrual period before you were 11 years old  Went through menopause after age 57  Have never been pregnant or had your first child after age 35  Have had breast cancer before  Drink alcohol in any form  Have dense breasts  Are older in age  There is no certain way to prevent breast cancer. There are things you can do to lower your chances of having breast cancer.  Keep a healthy weight. Lose weight if you are overweight. Being overweight raises your chances of having breast cancer.  Eat a healthy diet to maintain a healthy weight, such as more fruits, vegetables, and lean cuts of meat. Decrease the amount of saturated fat in your diet.  Exercise. Being active helps you keep a healthy weight.  Limit your alcohol intake or do not drink alcohol. The more alcohol you drink, the higher your risk.  Do not smoke cigarettes. Smoking can increase your risk of many types of cancer.  Breastfeed your baby. This may help protect you. The longer you breastfeed, the more protection you have.  Talk with your doctor about:  Limiting or stopping hormone therapy.  Taking certain drugs to prevent  breast cancer. For women at high risk of having breast cancer, there are a few drugs that may lower your risk.  Surgery to prevent you from having breast cancer if you are very high risk.  When do I need to call the doctor?   Changes in your breasts  A lump or area in your breast that feels different  Discharge from your nipple  Skin on your breast is dimpled or indented  You have questions or concerns about your breasts  Helpful tips   Talk to your doctor about the best kind of breast cancer screening for you.  If you want to do self breast exams, have your doctor show you the right way to do them.  Tell your doctor of any abnormal finding.  Last Reviewed Date   2021-10-04  Consumer Information Use and Disclaimer   This generalized information is a limited summary of diagnosis, treatment, and/or medication information. It is not meant to be comprehensive and should be used as a tool to help the user understand and/or assess potential diagnostic and treatment options. It does NOT include all information about conditions, treatments, medications, side effects, or risks that may apply to a specific patient. It is not intended to be medical advice or a substitute for the medical advice, diagnosis, or treatment of a health care provider based on the health care provider's examination and assessment of a patient’s specific and unique circumstances. Patients must speak with a health care provider for complete information about their health, medical questions, and treatment options, including any risks or benefits regarding use of medications. This information does not endorse any treatments or medications as safe, effective, or approved for treating a specific patient. Up[employer], Inc[employer] and its affiliates disclaim any warranty or liability relating to this information or the use thereof. The use of this information is governed by the Terms of Use, available at  https://www.woltersU-NOTEuwer.com/en/know/clinical-effectiveness-terms   Copyright   Copyright © 2024 [employer], Inc[employer] and its affiliates and/or licensors. All rights reserved.

## 2025-05-12 NOTE — PROGRESS NOTES
Name: Aruna Quiros      : 1970      MRN: [id number]  Encounter Provider: NITESH Pérez  Encounter Date: 2025   Encounter department: [facility]  :  Assessment & Plan  Encounter for annual routine gynecological examination  Calcium 1200-1500mg (in divided doses-max 600 mg at one time) + 600-1000 IU Vit D daily.   Exercise 150-300 minutes per week minimum including weight bearing exercises.   Pap with high risk HPV Q 5 years, if normal.    Call your insurance company to verify coverage prior to completing any ordered tests.   Annual mammogram ordered and monthly breast self exam recommended.    Kegels 20 times twice daily.   Vaginal moisturizers such as coconut oil as needed.   Return to office in one year or sooner, if needed.          History of colon cancer  Doing well.       Extremely dense tissue of both breasts on mammography    Orders:    MRI breast bilateral w and wo contrast w cad; Future    Encounter for screening mammogram for breast cancer    Orders:    Mammo screening bilateral w 3d and cad; Future    Encounter for screening for cervical cancer    Orders:    Liquid-based pap, screening        History of Present Illness   HPI  Aruna Quiros is a 54 y.o. female who presents for a routine annual visit. She denies any postmenopausal bleeding. EMB nondiagnostic but u/s nml in 2019.  LMP 2014 Medically induced menopause (Chemo/radiation)     Pap  Neg/Neg HPV, requests a pap today, +history of abnormal pap smears  Mammo , + fam hx with mat aunts and her sister. Requests ABUS but I suggested MRI instead.   History of Colon Cancer Followed by [facility].  Denies vaginal issues. Denies pelvic pain. Denies postmenopausal issues. She is NOT sexually active anymore,  with issues.    Has a 3 yr old granddaughter and 1 grandson (baby) in [place].  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  Hx of abn pap- no  . PMB- No  HRT- No  Mammogram- 2024 BI-RADS 2 - Order placed  Colonoscopy- 2024 repeat in 3 years  Current everyday cigarette smoker   Social drinker  Current marijuana smoker  GP   Currently not sexually active  No family history of uterine, ovarian, or cervical cancer. +FHX of breast cancer in one sister, and one MA.    No concerns/questions for today's visit      Review of Systems   Constitutional:  Negative for chills, fatigue, fever and unexpected weight change.   Respiratory:  Negative for shortness of breath.    Gastrointestinal:  Negative for anal bleeding, blood in stool, constipation and diarrhea.   Genitourinary:  Negative for difficulty urinating, dysuria and hematuria.   Neurological:  Negative for weakness.   Psychiatric/Behavioral:  Negative for agitation, behavioral problems and confusion.           Objective   /82 (BP Location: Left arm, Patient Position: Sitting, Cuff Size: Adult)   Ht 5' 4\" (1.626 m)   Wt 67.6 kg (149 lb)   LMP 2014 (Within Days) Comment: Medical induced menopsuse   BMI 25.58 kg/m²      Physical Exam  Constitutional:       General: She is not in acute distress.     Appearance: She is well-developed.   HENT:      Head: Normocephalic.   Pulmonary:      Effort: Pulmonary effort is normal.      Comments: Bilateral Implants  Chest:   Breasts:     Breasts are symmetrical.      Right: No inverted nipple, mass, nipple discharge, skin change or tenderness.      Left: No inverted nipple, mass, nipple discharge, skin change or tenderness.   Abdominal:      Palpations: Abdomen is soft.   Genitourinary:     Exam position: Supine.      Labia:         Right: No rash, tenderness, lesion or injury.         Left: No rash, tenderness, lesion or injury.       Vagina: No signs of injury and foreign body. No vaginal discharge, erythema or tenderness.      Cervix: No cervical motion " tenderness, discharge or friability.      Uterus: Not deviated, not enlarged, not fixed and not tender.       Adnexa:         Right: No mass, tenderness or fullness.          Left: No mass, tenderness or fullness.     Musculoskeletal:      Cervical back: Normal range of motion and neck supple.

## 2025-05-16 LAB
LAB AP GYN PRIMARY INTERPRETATION: NORMAL
Lab: NORMAL

## 2025-05-20 ENCOUNTER — RESULTS FOLLOW-UP (OUTPATIENT)
Dept: OBGYN CLINIC | Facility: CLINIC | Age: 55
End: 2025-05-20

## 2025-05-23 ENCOUNTER — TELEMEDICINE (OUTPATIENT)
Dept: BEHAVIORAL/MENTAL HEALTH CLINIC | Facility: CLINIC | Age: 55
End: 2025-05-23
Payer: COMMERCIAL

## 2025-05-23 DIAGNOSIS — F41.9 ANXIETY: Primary | ICD-10-CM

## 2025-05-23 PROCEDURE — 90834 PSYTX W PT 45 MINUTES: CPT | Performed by: SOCIAL WORKER

## 2025-05-23 NOTE — PSYCH
"Virtual Regular VisitName: Aruna Quiros      : 1970      MRN: [id number]  Encounter Provider: Oumou Riley  Encounter Date: 2025   Encounter department: [facility] [address]  :  Assessment & Plan  Anxiety             DATA: Therapist met w/pt for individual session.  Symptoms include: worry and racing thoughts when she is having idle time.  Therapist and pt discussed that she continues to try to stay busy but with the rain she has had more days that were \"boring\" which have created some increased anxiety.  Pt shared that she has been active but hasn't been working out.  Therapist probed pt to determine what may be the barriers with working out.  Pt shared that she had tried it but then started to get knee pain and doesn't want to \"fail\" at something again.  Therapist and pt processed this and pt identified that she will focus on getting back into working out and facing her challenges head on.      During this session, this clinician used the following therapeutic modalities: Client-centered Therapy and Cognitive Behavioral Therapy    Substance Abuse was not addressed during this session. If the client is diagnosed with a co-occurring substance use disorder, please indicate any changes in the frequency or amount of use: unknown. Stage of change for addressing substance use diagnoses: No substance use/Not applicable    ASSESSMENT:  Aruna presents with a Euthymic/ normal mood. Aruna's affect is Normal range and intensity, which is congruent, with their mood and the content of the session. The client has made progress on their goals as evidenced by improved symptoms and ability to implement positive coping skills.  Pt has increased insight and awareness.      Aruna presents with a none risk of suicide, none risk of self-harm, and none risk of harm to others.    For any risk assessment that surpasses a \"low\" rating, a safety plan must be developed.    A safety " plan was indicated: no  If yes, describe in detail n/a    PLAN: Between sessions, Aruna will continue to work on challenging her anxieties. At the next session, the therapist will use Client-centered Therapy and Cognitive Behavioral Therapy to address symptoms of anxiety.    Behavioral Health Treatment Plan [facility]: Diagnosis and Treatment Plan explained to Aruna, Aruna relates understanding diagnosis and is agreeable to Treatment Plan. Yes     Depression Follow-up Plan Completed: Not applicable     Reason for visit is No chief complaint on file.     Recent Visits  No visits were found meeting these conditions.  Showing recent visits within past 7 days and meeting all other requirements  Today's Visits  Date Type Provider Dept   05/23/25 Telemedicine Oumou Lin [facility] [address]   Showing today's visits and meeting all other requirements  Future Appointments  No visits were found meeting these conditions.  Showing future appointments within next 150 days and meeting all other requirements     History of Present Illness     HPI    Past Medical History   Past Medical History[1]  Past Surgical History[2]  Current Outpatient Medications   Medication Instructions    rosuvastatin (CRESTOR) 10 mg, Oral, Daily     Allergies[3]    Objective   LMP 07/01/2014 (Within Days) Comment: Medical induced menopsuse     Video Exam  Physical Exam     Administrative Statements   Encounter provider Oumou Lin    The Patient is located at Home and in the following state in which I hold an active license [place].    The patient was identified by name and date of birth. Aruna Quiros was informed that this is a telemedicine visit and that the visit is being conducted through the Epic Embedded platform. She agrees to proceed..  My office door was closed. No one else was in the room.  She acknowledged consent and understanding of privacy and security of the video platform. The patient has agreed to participate and  understands they can discontinue the visit at any time.        Visit Time  Start Time: 0804  Stop Time: 0850  Total Visit Time: 46 minutes         [1]   Past Medical History:  Diagnosis Date    Anxiety     Arthritis     Cancer (HCC)     Colon cancer (HCC) 2014    History of chemotherapy 2014    colon ca    History of radiation therapy 2014    colon ca    History of rectal cancer 2014    Locally advanced     History of tear of meniscus of knee joint     Hyperlipidemia     Tennis elbow    [2]   Past Surgical History:  Procedure Laterality Date    AUGMENTATION MAMMAPLASTY Bilateral 2005    BREAST SURGERY Bilateral     RECONSTRUCION WITH IMPLANT PROTHESIS     COLON SURGERY      COLONOSCOPY  04/2021    ELBOW SURGERY Left 2015    Tennis elbow    KNEE SURGERY      MENISCECTOMY Left 06/2016   [3]   Allergies  Allergen Reactions    Iodinated Contrast Media

## 2025-06-24 ENCOUNTER — HOSPITAL ENCOUNTER (OUTPATIENT)
Dept: MRI IMAGING | Facility: HOSPITAL | Age: 55
Discharge: HOME | End: 2025-06-24
Attending: NURSE PRACTITIONER
Payer: COMMERCIAL

## 2025-06-24 DIAGNOSIS — R92.343 EXTREMELY DENSE TISSUE OF BOTH BREASTS ON MAMMOGRAPHY: ICD-10-CM

## 2025-06-24 PROCEDURE — A9585 GADOBUTROL INJECTION: HCPCS | Performed by: NURSE PRACTITIONER

## 2025-06-24 PROCEDURE — C8908 MRI W/O FOL W/CONT, BREAST,: HCPCS

## 2025-06-24 PROCEDURE — C8937 CAD BREAST MRI: HCPCS

## 2025-06-24 RX ORDER — GADOBUTROL 604.72 MG/ML
6 INJECTION INTRAVENOUS
Status: COMPLETED | OUTPATIENT
Start: 2025-06-24 | End: 2025-06-24

## 2025-06-24 RX ADMIN — GADOBUTROL 6 ML: 604.72 INJECTION INTRAVENOUS at 10:12

## 2025-06-27 ENCOUNTER — TELEMEDICINE (OUTPATIENT)
Dept: BEHAVIORAL/MENTAL HEALTH CLINIC | Facility: CLINIC | Age: 55
End: 2025-06-27
Payer: COMMERCIAL

## 2025-06-27 DIAGNOSIS — F41.9 ANXIETY: Primary | ICD-10-CM

## 2025-06-27 PROCEDURE — 90837 PSYTX W PT 60 MINUTES: CPT | Performed by: SOCIAL WORKER

## 2025-06-27 NOTE — PSYCH
"Virtual Regular VisitName: Aruna Quiros      : 1970      MRN: [id number]  Encounter Provider: Oumou Riley  Encounter Date: 2025   Encounter department: [facility] [address]  :  Assessment & Plan  Anxiety             DATA: Therapist met w/pt for individual session.  Symptoms include: anxiety and stress.  Pt stated that in general she feels as if she is doing well.  She identified a few stressors over the past month but that she was able to effectively communicate rather \"ex;lode or lash out.\"  Therapist informed pt that she will be transitioning to another department within [facility] and began to discuss options.  Pt stated that she has grown a lot over the years and acknowledges that growth and feels as if she is ready for discharge and doesn't want ot start over.  Therapist and pt will meet one more time before pt discharges.    During this session, this clinician used the following therapeutic modalities: Cognitive Behavioral Therapy    Substance Abuse was addressed during this session. If the client is diagnosed with a co-occurring substance use disorder, please indicate any changes in the frequency or amount of use: none. Stage of change for addressing substance use diagnoses: No substance use/Not applicable    ASSESSMENT:  Aruna presents with a Euthymic/ normal mood. Aruna's affect is Normal range and intensity, which is congruent, with their mood and the content of the session. The client has made progress on their goals as evidenced by pts decrease in symptoms and ability to utilize positive coping skills.    Aruna presents with a none risk of suicide, none risk of self-harm, and none risk of harm to others.    For any risk assessment that surpasses a \"low\" rating, a safety plan must be developed.    A safety plan was indicated: no  If yes, describe in detail n/a    PLAN: Between sessions, Aruna will continue to practice coping skills and " utilizing her positive supports. At the next session, the therapist will use Cognitive Behavioral Therapy to address symptoms of depression/anxiety.    Behavioral Health Treatment Plan  [facility]: Diagnosis and Treatment Plan explained to Aruna, Aruna relates understanding diagnosis and is agreeable to Treatment Plan. Yes     Depression Follow-up Plan Completed: Not applicable     Reason for visit is No chief complaint on file.     Recent Visits  No visits were found meeting these conditions.  Showing recent visits within past 7 days and meeting all other requirements  Today's Visits  Date Type Provider Dept   06/27/25 Telemedicine Oumou Lin [facility] [address]   Showing today's visits and meeting all other requirements  Future Appointments  No visits were found meeting these conditions.  Showing future appointments within next 150 days and meeting all other requirements     History of Present Illness     HPI    Past Medical History   Past Medical History[1]  Past Surgical History[2]  Current Outpatient Medications   Medication Instructions    rosuvastatin (CRESTOR) 10 mg, Oral, Daily     Allergies[3]    Objective   LMP 07/01/2014 (Within Days) Comment: Medical induced menopsuse     Video Exam  Physical Exam     Administrative Statements   Encounter provider Oumou Lin    The Patient is located at Home and in the following state in which I hold an active license [place].    The patient was identified by name and date of birth. Aruna Quiros was informed that this is a telemedicine visit and that the visit is being conducted through the Epic Embedded platform. She agrees to proceed..  My office door was closed. No one else was in the room.  She acknowledged consent and understanding of privacy and security of the video platform. The patient has agreed to participate and understands they can discontinue the visit at any time.        Visit Time  Start Time: 0700  Stop Time: 0800  Total Visit Time: 60  minutes         [1]   Past Medical History:  Diagnosis Date    Anxiety     Arthritis     Cancer (HCC)     Colon cancer (HCC) 2014    History of chemotherapy 2014    colon ca    History of radiation therapy 2014    colon ca    History of rectal cancer 2014    Locally advanced     History of tear of meniscus of knee joint     Hyperlipidemia     Tennis elbow    [2]   Past Surgical History:  Procedure Laterality Date    AUGMENTATION MAMMAPLASTY Bilateral 2005    BREAST SURGERY Bilateral     RECONSTRUCION WITH IMPLANT PROTHESIS     COLON SURGERY      COLONOSCOPY  04/2021    ELBOW SURGERY Left 2015    Tennis elbow    KNEE SURGERY      MENISCECTOMY Left 06/2016   [3]   Allergies  Allergen Reactions    Iodinated Contrast Media

## 2025-07-28 ENCOUNTER — TELEMEDICINE (OUTPATIENT)
Dept: BEHAVIORAL/MENTAL HEALTH CLINIC | Facility: CLINIC | Age: 55
End: 2025-07-28
Payer: COMMERCIAL

## 2025-07-28 DIAGNOSIS — F41.9 ANXIETY: Primary | ICD-10-CM

## 2025-07-28 PROCEDURE — 90837 PSYTX W PT 60 MINUTES: CPT | Performed by: SOCIAL WORKER

## 2025-08-02 DIAGNOSIS — E78.5 HYPERLIPIDEMIA, UNSPECIFIED HYPERLIPIDEMIA TYPE: ICD-10-CM

## 2025-08-04 RX ORDER — ROSUVASTATIN CALCIUM 10 MG/1
10 TABLET, COATED ORAL DAILY
Qty: 90 TABLET | Refills: 0 | Status: SHIPPED | OUTPATIENT
Start: 2025-08-04